# Patient Record
Sex: FEMALE | Race: WHITE | NOT HISPANIC OR LATINO | Employment: FULL TIME | URBAN - METROPOLITAN AREA
[De-identification: names, ages, dates, MRNs, and addresses within clinical notes are randomized per-mention and may not be internally consistent; named-entity substitution may affect disease eponyms.]

---

## 2017-01-13 ENCOUNTER — LAB VISIT (OUTPATIENT)
Dept: LAB | Facility: HOSPITAL | Age: 47
End: 2017-01-13
Attending: FAMILY MEDICINE
Payer: COMMERCIAL

## 2017-01-13 DIAGNOSIS — E78.5 ELEVATED LIPIDS: ICD-10-CM

## 2017-01-13 DIAGNOSIS — R79.89 LOW VITAMIN D LEVEL: ICD-10-CM

## 2017-01-13 LAB
25(OH)D3+25(OH)D2 SERPL-MCNC: 25 NG/ML
ALT SERPL W/O P-5'-P-CCNC: 46 U/L
CHOLEST/HDLC SERPL: 8.3 {RATIO}
HDL/CHOLESTEROL RATIO: 12 %
HDLC SERPL-MCNC: 216 MG/DL
HDLC SERPL-MCNC: 26 MG/DL
LDLC SERPL CALC-MCNC: ABNORMAL MG/DL
NONHDLC SERPL-MCNC: 190 MG/DL
TRIGL SERPL-MCNC: 491 MG/DL

## 2017-01-13 PROCEDURE — 36415 COLL VENOUS BLD VENIPUNCTURE: CPT | Mod: PO

## 2017-01-13 PROCEDURE — 82306 VITAMIN D 25 HYDROXY: CPT

## 2017-01-13 PROCEDURE — 84460 ALANINE AMINO (ALT) (SGPT): CPT

## 2017-01-13 PROCEDURE — 80061 LIPID PANEL: CPT

## 2017-01-16 ENCOUNTER — TELEPHONE (OUTPATIENT)
Dept: FAMILY MEDICINE | Facility: CLINIC | Age: 47
End: 2017-01-16

## 2017-01-16 DIAGNOSIS — R79.89 LOW VITAMIN D LEVEL: ICD-10-CM

## 2017-01-16 DIAGNOSIS — E55.9 VITAMIN D DEFICIENCY: ICD-10-CM

## 2017-01-16 DIAGNOSIS — E78.5 HYPERLIPIDEMIA, UNSPECIFIED HYPERLIPIDEMIA TYPE: Primary | ICD-10-CM

## 2017-01-16 NOTE — TELEPHONE ENCOUNTER
----- Message from Michelle Lopez sent at 1/16/2017  9:49 AM CST -----  PLEASE CALL PT [phone] IN REGARDS TO PT WAS CALLING YOUR OFFICE TO GET LAB TEST RESULTS READ BACK TO HER.

## 2017-02-03 ENCOUNTER — TELEPHONE (OUTPATIENT)
Dept: FAMILY MEDICINE | Facility: CLINIC | Age: 47
End: 2017-02-03

## 2017-02-03 NOTE — TELEPHONE ENCOUNTER
----- Message from Papa Nayak sent at 2/3/2017  8:43 AM CST -----  Pt is requesting the nurse f/u with a refill request for her medication to [facility]. Pt states a fax was sent over to the provider to complete.          Please call pt back [phone]

## 2017-02-06 ENCOUNTER — TELEPHONE (OUTPATIENT)
Dept: FAMILY MEDICINE | Facility: CLINIC | Age: 47
End: 2017-02-06

## 2017-02-06 NOTE — TELEPHONE ENCOUNTER
----- Message from Peggy Wall sent at 2/6/2017 10:04 AM CST -----  Contact: pt  Pt calling to speak to nurse....states that Dr was supposed to get in touch with [facility] for her diabetic supplies so that she can get Rx refill on test strips but has not heard anything yet and is running low on her supplies....please adv/call pt back at [phone]///thx jw

## 2017-02-09 ENCOUNTER — TELEPHONE (OUTPATIENT)
Dept: FAMILY MEDICINE | Facility: CLINIC | Age: 47
End: 2017-02-09

## 2017-02-09 NOTE — TELEPHONE ENCOUNTER
----- Message from Ana Amador sent at 2/9/2017  9:52 AM CST -----  Contact: dwayne gunn  3rd request - needs refill on Lancets and Test strips - request was also faxed by [facility] - pt is out and needs today please.  Please call pt with a status update ASAP today at [phone]

## 2017-03-24 ENCOUNTER — LAB VISIT (OUTPATIENT)
Dept: LAB | Facility: HOSPITAL | Age: 47
End: 2017-03-24
Attending: FAMILY MEDICINE
Payer: COMMERCIAL

## 2017-03-24 DIAGNOSIS — E11.9 TYPE 2 DIABETES MELLITUS WITHOUT COMPLICATION: ICD-10-CM

## 2017-03-24 LAB
ALT SERPL W/O P-5'-P-CCNC: 67 U/L
ANION GAP SERPL CALC-SCNC: 9 MMOL/L
BUN SERPL-MCNC: 12 MG/DL
CALCIUM SERPL-MCNC: 9.5 MG/DL
CHLORIDE SERPL-SCNC: 107 MMOL/L
CHOLEST/HDLC SERPL: 7.4 {RATIO}
CO2 SERPL-SCNC: 24 MMOL/L
CREAT SERPL-MCNC: 0.8 MG/DL
EST. GFR  (AFRICAN AMERICAN): >60 ML/MIN/1.73 M^2
EST. GFR  (NON AFRICAN AMERICAN): >60 ML/MIN/1.73 M^2
GLUCOSE SERPL-MCNC: 112 MG/DL
HDL/CHOLESTEROL RATIO: 13.5 %
HDLC SERPL-MCNC: 244 MG/DL
HDLC SERPL-MCNC: 33 MG/DL
LDLC SERPL CALC-MCNC: ABNORMAL MG/DL
NONHDLC SERPL-MCNC: 211 MG/DL
POTASSIUM SERPL-SCNC: 4.2 MMOL/L
SODIUM SERPL-SCNC: 140 MMOL/L
TRIGL SERPL-MCNC: 403 MG/DL

## 2017-03-24 PROCEDURE — 84460 ALANINE AMINO (ALT) (SGPT): CPT

## 2017-03-24 PROCEDURE — 80061 LIPID PANEL: CPT

## 2017-03-24 PROCEDURE — 36415 COLL VENOUS BLD VENIPUNCTURE: CPT | Mod: PO

## 2017-03-24 PROCEDURE — 83036 HEMOGLOBIN GLYCOSYLATED A1C: CPT

## 2017-03-24 PROCEDURE — 80048 BASIC METABOLIC PNL TOTAL CA: CPT

## 2017-03-25 LAB
ESTIMATED AVG GLUCOSE: 148 MG/DL
HBA1C MFR BLD HPLC: 6.8 %

## 2017-04-08 DIAGNOSIS — E78.5 HYPERLIPIDEMIA, UNSPECIFIED HYPERLIPIDEMIA TYPE: Primary | ICD-10-CM

## 2017-04-08 DIAGNOSIS — E11.9 TYPE 2 DIABETES MELLITUS WITHOUT COMPLICATION, WITHOUT LONG-TERM CURRENT USE OF INSULIN: ICD-10-CM

## 2017-04-08 RX ORDER — ATORVASTATIN CALCIUM 40 MG/1
40 TABLET, FILM COATED ORAL DAILY
Qty: 90 TABLET | Refills: 3 | Status: SHIPPED | OUTPATIENT
Start: 2017-04-08 | End: 2018-04-20 | Stop reason: SDUPTHER

## 2017-04-08 NOTE — TELEPHONE ENCOUNTER
----- Message from Mikey Ayon MD sent at 4/8/2017  9:49 AM CDT -----  Sugar increasing, but still acceptable.  Lipids too high.  Recommend discontinue lovastatin.  Start Lipitor 40 mg daily.  Repeat lipid, ALT, hemoglobin A1c, urine microalbumin in July. My nurse will contact you to arrange.  Thanks,  Dr. Ayon    Results released on [facility]

## 2017-04-10 ENCOUNTER — TELEPHONE (OUTPATIENT)
Dept: FAMILY MEDICINE | Facility: CLINIC | Age: 47
End: 2017-04-10

## 2017-04-10 NOTE — TELEPHONE ENCOUNTER
----- Message from Mariel Rogel sent at 4/10/2017 11:57 AM CDT -----  Contact: Patient  Patient returned call. She can be contacted at [phone].    Thanks,  Mariel

## 2017-04-17 RX ORDER — NORETHINDRONE 0.35 MG/1
TABLET ORAL
Qty: 28 TABLET | Refills: 0 | Status: SHIPPED | OUTPATIENT
Start: 2017-04-17 | End: 2017-06-02 | Stop reason: SDUPTHER

## 2017-06-02 ENCOUNTER — OFFICE VISIT (OUTPATIENT)
Dept: FAMILY MEDICINE | Facility: CLINIC | Age: 47
End: 2017-06-02
Payer: COMMERCIAL

## 2017-06-02 VITALS
WEIGHT: 220.69 LBS | DIASTOLIC BLOOD PRESSURE: 87 MMHG | BODY MASS INDEX: 36.77 KG/M2 | SYSTOLIC BLOOD PRESSURE: 131 MMHG | HEART RATE: 80 BPM | HEIGHT: 65 IN

## 2017-06-02 DIAGNOSIS — Z01.419 WELL WOMAN EXAM WITH ROUTINE GYNECOLOGICAL EXAM: Primary | ICD-10-CM

## 2017-06-02 DIAGNOSIS — Z76.0 MEDICATION REFILL: ICD-10-CM

## 2017-06-02 LAB — B-HCG UR QL: NEGATIVE

## 2017-06-02 PROCEDURE — 99213 OFFICE O/P EST LOW 20 MIN: CPT | Mod: S$GLB,,, | Performed by: NURSE PRACTITIONER

## 2017-06-02 PROCEDURE — 88175 CYTOPATH C/V AUTO FLUID REDO: CPT

## 2017-06-02 PROCEDURE — 99999 PR PBB SHADOW E&M-EST. PATIENT-LVL IV: CPT | Mod: PBBFAC,,, | Performed by: NURSE PRACTITIONER

## 2017-06-02 PROCEDURE — 81025 URINE PREGNANCY TEST: CPT | Mod: PO

## 2017-06-02 RX ORDER — ACETAMINOPHEN AND CODEINE PHOSPHATE 120; 12 MG/5ML; MG/5ML
SOLUTION ORAL
Qty: 28 TABLET | Refills: 11 | Status: SHIPPED | OUTPATIENT
Start: 2017-06-02 | End: 2018-04-20 | Stop reason: SDUPTHER

## 2017-06-02 NOTE — PROGRESS NOTES
Subjective:       Patient ID: Yarely Thompson is a 46 y.o. female.    Chief Complaint: Well Woman    Gynecologic Exam   The patient's pertinent negatives include no genital itching, genital lesions, genital odor, genital rash, missed menses, pelvic pain, vaginal bleeding or vaginal discharge. Primary symptoms comment: Pt in today for routine well woman exam. The patient is experiencing no pain. She is not pregnant. Pertinent negatives include no abdominal pain, anorexia, back pain, chills, constipation, diarrhea, discolored urine, dysuria, fever, flank pain, frequency, headaches, hematuria, joint pain, joint swelling, nausea, painful intercourse, rash, sore throat, urgency or vomiting. She uses oral contraceptives for contraception. Menstrual history: No periods in 2 y; birth control. There is no history of an abdominal surgery, a  section, an ectopic pregnancy, endometriosis, a gynecological surgery, herpes simplex, menorrhagia, metrorrhagia, miscarriage, ovarian cysts, perineal abscess, PID, an STD, a terminated pregnancy or vaginosis.     Past Medical History:   Diagnosis Date    Anxiety     Depression     HTN (hypertension)     Hyperlipidemia     ADEN (stress urinary incontinence, female)     Vitamin D insufficiency      Social History     Social History    Marital status:      Spouse name: N/A    Number of children: N/A    Years of education: N/A     Occupational History         Social History Main Topics    Smoking status: Current Every Day Smoker     Packs/day: 1.50     Years: 28.00     Types: Cigarettes    Smokeless tobacco: Not on file      Comment: current smike 7/day    Alcohol use No    Drug use: Unknown    Sexual activity: Not on file     History reviewed. No pertinent surgical history.    Review of Systems   Constitutional: Negative.  Negative for chills and fever.   HENT: Negative.  Negative for sore throat.    Eyes: Negative.    Respiratory: Negative.    Cardiovascular:  Negative.    Gastrointestinal: Negative.  Negative for abdominal pain, anorexia, constipation, diarrhea, nausea and vomiting.   Endocrine: Negative.    Genitourinary: Negative.  Negative for dysuria, flank pain, frequency, hematuria, menorrhagia, missed menses, pelvic pain, urgency and vaginal discharge.   Musculoskeletal: Negative.  Negative for back pain and joint pain.   Skin: Negative.  Negative for rash.   Allergic/Immunologic: Negative.    Neurological: Negative.  Negative for headaches.   Psychiatric/Behavioral: Negative.        Objective:      Physical Exam   Constitutional: She is oriented to person, place, and time. She appears well-developed and well-nourished.   HENT:   Head: Normocephalic.   Right Ear: External ear normal.   Left Ear: External ear normal.   Nose: Nose normal.   Mouth/Throat: Oropharynx is clear and moist.   Eyes: Conjunctivae are normal. Pupils are equal, round, and reactive to light.   Neck: Normal range of motion. Neck supple.   Cardiovascular: Normal rate, regular rhythm and normal heart sounds.    Pulmonary/Chest: Effort normal and breath sounds normal.   Abdominal: Soft. Bowel sounds are normal. Hernia confirmed negative in the right inguinal area and confirmed negative in the left inguinal area.   Genitourinary: Rectum normal, vagina normal and uterus normal. Pelvic exam was performed with patient supine. No labial fusion. There is no rash, tenderness, lesion or injury on the right labia. There is no rash, tenderness, lesion or injury on the left labia. Cervix exhibits no motion tenderness, no discharge and no friability. Right adnexum displays no mass, no tenderness and no fullness. Left adnexum displays no mass, no tenderness and no fullness.   Musculoskeletal: Normal range of motion.   Lymphadenopathy:        Right: No inguinal adenopathy present.        Left: No inguinal adenopathy present.   Neurological: She is alert and oriented to person, place, and time.   Skin: Skin is  warm and dry. Capillary refill takes 2 to 3 seconds.   Psychiatric: She has a normal mood and affect. Her behavior is normal. Judgment and thought content normal.   Nursing note and vitals reviewed.      Assessment:       1. Well woman exam with routine gynecological exam    2.      Medication refill   Plan:           Yarely was seen today for well woman.    Diagnoses and all orders for this visit:    Well woman exam with routine gynecological exam  Medication refill  -     Liquid-based pap smear, screening  -     Mammo Digital Screening Bilateral With CAD; Future  -     Pregnancy, urine rapid (results: negative)  -     norethindrone (ANAY-BE) 0.35 mg tablet; Take 1 tablet (0.35 mg total) by mouth once daily.

## 2017-06-13 ENCOUNTER — TELEPHONE (OUTPATIENT)
Dept: FAMILY MEDICINE | Facility: CLINIC | Age: 47
End: 2017-06-13

## 2017-06-13 RX ORDER — METFORMIN HYDROCHLORIDE 850 MG/1
TABLET ORAL
Qty: 60 TABLET | OUTPATIENT
Start: 2017-06-13

## 2017-06-13 RX ORDER — GABAPENTIN 300 MG/1
CAPSULE ORAL
Qty: 90 CAPSULE | Refills: 1 | Status: SHIPPED | OUTPATIENT
Start: 2017-06-13 | End: 2017-07-14 | Stop reason: SDUPTHER

## 2017-06-13 NOTE — TELEPHONE ENCOUNTER
----- Message from Michelle Lopez sent at 6/13/2017 11:01 AM CDT -----  Pt would like to know if she can have a antibiotic call in for a cold to [facility] in [facility].pt can be reach at [phone].

## 2017-06-13 NOTE — TELEPHONE ENCOUNTER
C/o chest cold, cough & congestion, for couple of days, afebrile, offered OV, declined, requests cough medication, will wait for Dr Ayon tomorrow.

## 2017-06-15 RX ORDER — INSULIN ASPART 100 [IU]/ML
18 INJECTION, SOLUTION INTRAVENOUS; SUBCUTANEOUS
Qty: 15 ML | Refills: 12 | Status: SHIPPED | OUTPATIENT
Start: 2017-06-15 | End: 2018-09-01 | Stop reason: SDUPTHER

## 2017-07-14 ENCOUNTER — HOSPITAL ENCOUNTER (OUTPATIENT)
Dept: RADIOLOGY | Facility: HOSPITAL | Age: 47
Discharge: HOME | End: 2017-07-14
Attending: NURSE PRACTITIONER
Payer: COMMERCIAL

## 2017-07-14 VITALS — BODY MASS INDEX: 36.65 KG/M2 | HEIGHT: 65 IN | WEIGHT: 220 LBS

## 2017-07-14 DIAGNOSIS — Z01.419 WELL WOMAN EXAM WITH ROUTINE GYNECOLOGICAL EXAM: ICD-10-CM

## 2017-07-14 PROCEDURE — 77067 SCR MAMMO BI INCL CAD: CPT | Mod: TC

## 2017-07-14 PROCEDURE — 77067 SCR MAMMO BI INCL CAD: CPT | Mod: 26,,, | Performed by: RADIOLOGY

## 2017-07-14 PROCEDURE — 77063 BREAST TOMOSYNTHESIS BI: CPT | Mod: 26,,, | Performed by: RADIOLOGY

## 2017-07-14 RX ORDER — ERGOCALCIFEROL 1.25 MG/1
CAPSULE ORAL
Qty: 4 CAPSULE | Refills: 0 | Status: SHIPPED | OUTPATIENT
Start: 2017-07-14 | End: 2018-04-20

## 2017-07-14 RX ORDER — METFORMIN HYDROCHLORIDE 850 MG/1
TABLET ORAL
Qty: 60 TABLET | Refills: 5 | Status: SHIPPED | OUTPATIENT
Start: 2017-07-14 | End: 2018-03-16 | Stop reason: SDUPTHER

## 2017-07-14 RX ORDER — PEN NEEDLE, DIABETIC 31 GX3/16"
NEEDLE, DISPOSABLE MISCELLANEOUS
Qty: 100 EACH | Refills: 5 | Status: SHIPPED | OUTPATIENT
Start: 2017-07-14

## 2017-07-17 RX ORDER — GABAPENTIN 300 MG/1
CAPSULE ORAL
Qty: 90 CAPSULE | Refills: 4 | Status: SHIPPED | OUTPATIENT
Start: 2017-07-17 | End: 2018-04-20

## 2017-07-26 DIAGNOSIS — Z79.4 TYPE 2 DIABETES MELLITUS WITH MICROALBUMINURIA, WITH LONG-TERM CURRENT USE OF INSULIN: Primary | ICD-10-CM

## 2017-07-26 DIAGNOSIS — E11.29 TYPE 2 DIABETES MELLITUS WITH MICROALBUMINURIA, WITH LONG-TERM CURRENT USE OF INSULIN: Primary | ICD-10-CM

## 2017-07-26 DIAGNOSIS — R80.9 TYPE 2 DIABETES MELLITUS WITH MICROALBUMINURIA, WITH LONG-TERM CURRENT USE OF INSULIN: Primary | ICD-10-CM

## 2017-07-26 RX ORDER — LISINOPRIL 20 MG/1
20 TABLET ORAL DAILY
Qty: 90 TABLET | Refills: 3 | Status: SHIPPED | OUTPATIENT
Start: 2017-07-26 | End: 2018-09-15 | Stop reason: SDUPTHER

## 2018-01-12 ENCOUNTER — LAB VISIT (OUTPATIENT)
Dept: LAB | Facility: HOSPITAL | Age: 48
End: 2018-01-12
Attending: FAMILY MEDICINE
Payer: COMMERCIAL

## 2018-01-12 DIAGNOSIS — E11.29 TYPE 2 DIABETES MELLITUS WITH MICROALBUMINURIA, WITH LONG-TERM CURRENT USE OF INSULIN: ICD-10-CM

## 2018-01-12 DIAGNOSIS — R80.9 TYPE 2 DIABETES MELLITUS WITH MICROALBUMINURIA, WITH LONG-TERM CURRENT USE OF INSULIN: ICD-10-CM

## 2018-01-12 DIAGNOSIS — Z79.4 TYPE 2 DIABETES MELLITUS WITH MICROALBUMINURIA, WITH LONG-TERM CURRENT USE OF INSULIN: ICD-10-CM

## 2018-01-12 LAB
CREAT UR-MCNC: 97 MG/DL
MICROALBUMIN UR DL<=1MG/L-MCNC: 365 UG/ML
MICROALBUMIN/CREATININE RATIO: 376.3 UG/MG

## 2018-01-12 PROCEDURE — 82043 UR ALBUMIN QUANTITATIVE: CPT

## 2018-01-29 ENCOUNTER — TELEPHONE (OUTPATIENT)
Dept: FAMILY MEDICINE | Facility: CLINIC | Age: 48
End: 2018-01-29

## 2018-01-29 DIAGNOSIS — E78.5 HYPERLIPIDEMIA, UNSPECIFIED HYPERLIPIDEMIA TYPE: Primary | ICD-10-CM

## 2018-03-16 ENCOUNTER — OFFICE VISIT (OUTPATIENT)
Dept: FAMILY MEDICINE | Facility: CLINIC | Age: 48
End: 2018-03-16
Payer: COMMERCIAL

## 2018-03-16 ENCOUNTER — TELEPHONE (OUTPATIENT)
Dept: FAMILY MEDICINE | Facility: CLINIC | Age: 48
End: 2018-03-16

## 2018-03-16 VITALS
WEIGHT: 216.63 LBS | BODY MASS INDEX: 34.81 KG/M2 | HEIGHT: 66 IN | TEMPERATURE: 98 F | HEART RATE: 82 BPM | SYSTOLIC BLOOD PRESSURE: 132 MMHG | DIASTOLIC BLOOD PRESSURE: 82 MMHG

## 2018-03-16 DIAGNOSIS — E78.5 HYPERLIPIDEMIA ASSOCIATED WITH TYPE 2 DIABETES MELLITUS: ICD-10-CM

## 2018-03-16 DIAGNOSIS — E11.59 HYPERTENSION ASSOCIATED WITH DIABETES: ICD-10-CM

## 2018-03-16 DIAGNOSIS — F17.200 SMOKING: ICD-10-CM

## 2018-03-16 DIAGNOSIS — I15.2 HYPERTENSION ASSOCIATED WITH DIABETES: ICD-10-CM

## 2018-03-16 DIAGNOSIS — E11.69 HYPERLIPIDEMIA ASSOCIATED WITH TYPE 2 DIABETES MELLITUS: ICD-10-CM

## 2018-03-16 DIAGNOSIS — E66.01 SEVERE OBESITY (BMI 35.0-35.9 WITH COMORBIDITY): ICD-10-CM

## 2018-03-16 DIAGNOSIS — Z00.00 ANNUAL PHYSICAL EXAM: Primary | ICD-10-CM

## 2018-03-16 PROCEDURE — 99396 PREV VISIT EST AGE 40-64: CPT | Mod: S$GLB,,, | Performed by: FAMILY MEDICINE

## 2018-03-16 PROCEDURE — 99999 PR PBB SHADOW E&M-EST. PATIENT-LVL IV: CPT | Mod: PBBFAC,,, | Performed by: FAMILY MEDICINE

## 2018-03-16 RX ORDER — METFORMIN HYDROCHLORIDE 850 MG/1
TABLET ORAL
Qty: 60 TABLET | Refills: 5 | Status: SHIPPED | OUTPATIENT
Start: 2018-03-16 | End: 2018-04-20

## 2018-03-16 NOTE — PROGRESS NOTES
"Presents physical exam.  Diabetes has been controlled on insulin.  She does miss or forget to take her Lantus more than half the time especially if she doesn't eat in the evening.  States compliance with her mealtime insulin.  She is concerned regarding weight gain.  Multiple medications which can contribute including her insulin and psychiatric medications for which she sees psychiatrist.  She does continue to smoke, not currently interested in cessation.    Diabetes Management Status    Statin: Taking  ACE/ARB: Taking    Screening or Prevention Patient's value Goal Complete/Controlled?   HgA1C Testing and Control   Lab Results   Component Value Date    HGBA1C 6.5 (H) 01/12/2018      Annually/Less than 8% Yes   Lipid profile : 07/14/2017 Annually Yes   LDL control Lab Results   Component Value Date    LDLCALC 69.2 07/14/2017    Annually/Less than 100 mg/dl  Yes   Nephropathy screening Lab Results   Component Value Date    LABMICR 365.0 01/12/2018     No results found for: PROTEINUA Annually Yes   Blood pressure BP Readings from Last 1 Encounters:   03/16/18 132/82    Less than 140/90 Yes   Dilated retinal exam : 07/27/2016 Annually No   Foot exam   : 03/16/2018 Annually Yes       OBJECTIVE:   Vitals:    03/16/18 1345   BP: 132/82   Pulse: 82   Temp: 98.2 °F (36.8 °C)   TempSrc: Oral   Weight: 98.2 kg (216 lb 9.6 oz)   Height: 5' 5.5" (1.664 m)     Wt Readings from Last 3 Encounters:   03/16/18 98.2 kg (216 lb 9.6 oz)   07/14/17 99.8 kg (220 lb)   06/02/17 100.1 kg (220 lb 10.9 oz)       APPEARANCE: Well nourished, well developed, in no acute distress.   HEAD: Normocephalic. Atraumatic. No sinus tenderness.   EYES:   Right eye: Pupil reactive. Conjunctiva clear.   Left eye: Pupil reactive. Conjunctiva clear.   Both fundi: Grossly normal to nondilated exam. EOMI.   EARS: TM's intact. Light reflex normal. No retraction or perforation.   NOSE: clear.   MOUTH & THROAT: No pharyngeal erythema or exudate. No lesions. "   NECK: No bruits. No JVD. No cervical lymphadenopathy. No thyromegaly.   CHEST: Breath sounds clear bilaterally. Normal respiratory effort   CARDIOVASCULAR: Normal rate. Regular rhythm. No murmurs. No rub. No gallops.   ABDOMEN: Bowel sounds normal. Soft. No tenderness. No organomegaly.   PERIPHERAL VASCULAR: No cyanosis. No clubbing. No edema.   NEUROLOGIC: No focal findings.    Feet:Sensation in the feet intact to monofilament testing.   No significant foot lesions.Pulses palpable.  MENTAL STATUS: Alert. Oriented x 3.         Yarely was seen today for annual exam.    Diagnoses and all orders for this visit:    Annual physical exam    Hypertension associated with diabetes  -     Ambulatory referral to Optometry    Hyperlipidemia associated with type 2 diabetes mellitus    Smoking    Severe obesity (BMI 35.0-35.9 with comorbidity)    Other orders  -     dulaglutide (TRULICITY) 0.75 mg/0.5 mL PnIj; Inject 0.5 mLs (0.75 mg total) into the skin every 7 days.     will discontinue the Lantus for now.  Continue other medication as currently.  Monitor glucose readings closely.  Follow-up 1 month.  Request report from last tetanus immunization at [place].  Has follow-up laboratory scheduled later this year.Anticipatory guidance: Don't smoke.  Healthy diet and regular exercise recommended.  Keep follow-up psychiatry

## 2018-03-16 NOTE — TELEPHONE ENCOUNTER
----- Message from Brittany Matt sent at 3/16/2018 10:12 AM CDT -----  Contact: Patient  1. What is the name of the medication you are requesting? Lantus  2. What is the dose? Does not know  3. How do you take the medication? Orally, topically, etc?   4. How often do you take this medication? Once nightly  5. Do you need a 30 day or 90 day supply? 90  6. How many refills are you requesting? 1  7. What is your preferred pharmacy and location of the pharmacy? [facility]  8. Who can we contact with further questions? Patient at [phone]

## 2018-04-06 ENCOUNTER — PATIENT OUTREACH (OUTPATIENT)
Dept: ADMINISTRATIVE | Facility: HOSPITAL | Age: 48
End: 2018-04-06

## 2018-04-06 NOTE — PROGRESS NOTES
Pre-Visit Chart audit complete, Immunization record (Flu Vaccine) and HM updated.  Alert PCP/Staff concering HM updates needed.

## 2018-04-20 ENCOUNTER — OFFICE VISIT (OUTPATIENT)
Dept: FAMILY MEDICINE | Facility: CLINIC | Age: 48
End: 2018-04-20
Payer: COMMERCIAL

## 2018-04-20 VITALS
DIASTOLIC BLOOD PRESSURE: 78 MMHG | HEART RATE: 82 BPM | HEIGHT: 66 IN | BODY MASS INDEX: 33.77 KG/M2 | SYSTOLIC BLOOD PRESSURE: 123 MMHG | WEIGHT: 210.13 LBS

## 2018-04-20 DIAGNOSIS — E11.9 TYPE 2 DIABETES MELLITUS WITHOUT COMPLICATION, WITH LONG-TERM CURRENT USE OF INSULIN: Primary | ICD-10-CM

## 2018-04-20 DIAGNOSIS — Z79.4 TYPE 2 DIABETES MELLITUS WITHOUT COMPLICATION, WITH LONG-TERM CURRENT USE OF INSULIN: Primary | ICD-10-CM

## 2018-04-20 DIAGNOSIS — E11.69 HYPERLIPIDEMIA ASSOCIATED WITH TYPE 2 DIABETES MELLITUS: ICD-10-CM

## 2018-04-20 DIAGNOSIS — E78.5 HYPERLIPIDEMIA ASSOCIATED WITH TYPE 2 DIABETES MELLITUS: ICD-10-CM

## 2018-04-20 DIAGNOSIS — Z91.148 NONCOMPLIANCE WITH MEDICATION TREATMENT DUE TO UNDERUSE OF MEDICATION: ICD-10-CM

## 2018-04-20 PROBLEM — E66.09 OBESITY DUE TO EXCESS CALORIES WITH SERIOUS COMORBIDITY: Status: ACTIVE | Noted: 2018-03-16

## 2018-04-20 PROCEDURE — 99213 OFFICE O/P EST LOW 20 MIN: CPT | Mod: 25,S$GLB,, | Performed by: FAMILY MEDICINE

## 2018-04-20 PROCEDURE — 3074F SYST BP LT 130 MM HG: CPT | Mod: CPTII,S$GLB,, | Performed by: FAMILY MEDICINE

## 2018-04-20 PROCEDURE — 90471 IMMUNIZATION ADMIN: CPT | Mod: S$GLB,,, | Performed by: FAMILY MEDICINE

## 2018-04-20 PROCEDURE — 3078F DIAST BP <80 MM HG: CPT | Mod: CPTII,S$GLB,, | Performed by: FAMILY MEDICINE

## 2018-04-20 PROCEDURE — 99999 PR PBB SHADOW E&M-EST. PATIENT-LVL III: CPT | Mod: PBBFAC,,, | Performed by: FAMILY MEDICINE

## 2018-04-20 PROCEDURE — 3044F HG A1C LEVEL LT 7.0%: CPT | Mod: CPTII,S$GLB,, | Performed by: FAMILY MEDICINE

## 2018-04-20 PROCEDURE — 90715 TDAP VACCINE 7 YRS/> IM: CPT | Mod: S$GLB,,, | Performed by: FAMILY MEDICINE

## 2018-04-20 RX ORDER — METFORMIN HYDROCHLORIDE 500 MG/1
2000 TABLET, EXTENDED RELEASE ORAL
Qty: 360 TABLET | Refills: 1 | Status: SHIPPED | OUTPATIENT
Start: 2018-04-20 | End: 2018-11-01 | Stop reason: SDUPTHER

## 2018-04-20 RX ORDER — ATORVASTATIN CALCIUM 40 MG/1
40 TABLET, FILM COATED ORAL DAILY
Qty: 90 TABLET | Refills: 3 | Status: SHIPPED | OUTPATIENT
Start: 2018-04-20 | End: 2019-04-25 | Stop reason: SDUPTHER

## 2018-04-20 RX ORDER — ACETAMINOPHEN AND CODEINE PHOSPHATE 120; 12 MG/5ML; MG/5ML
SOLUTION ORAL
Qty: 28 TABLET | Refills: 5 | Status: SHIPPED | OUTPATIENT
Start: 2018-04-20 | End: 2018-08-01

## 2018-04-20 NOTE — PROGRESS NOTES
Follow-up diabetes.  We started Trulicity and discontinued Lantus in an effort to promote weight loss.  She did lose 6 pounds.  However her glucose readings are elevated, somewhat labile.  She's been missing her evening dose of metformin for quite a while.  States compliance with mealtime insulin.    Past Medical History:  Past Medical History:   Diagnosis Date    Anxiety     Depression     HTN (hypertension)     Hyperlipidemia     Severe obesity (BMI 35.0-35.9 with comorbidity) 3/16/2018    ADEN (stress urinary incontinence, female)     Vitamin D insufficiency      No past surgical history on file.  Social History     Social History    Marital status:      Spouse name: N/A    Number of children: N/A    Years of education: N/A     Occupational History    Not on file.     Social History Main Topics    Smoking status: Current Every Day Smoker     Packs/day: 1.50     Years: 28.00     Types: Cigarettes    Smokeless tobacco: Not on file      Comment: current smike 7/day    Alcohol use No    Drug use: Unknown    Sexual activity: Not on file     Other Topics Concern    Not on file     Social History Narrative    No narrative on file     Family History   Problem Relation Age of Onset    Lymphoma Father     Hypertension Father     Diabetes Father     Hypertension Mother     Diabetes Mother      Review of patient's allergies indicates:   Allergen Reactions    Pcn [penicillins] Rash     Current Outpatient Prescriptions on File Prior to Visit   Medication Sig Dispense Refill    aripiprazole (ABILIFY) 5 MG Tab Take 1 tablet (5 mg total) by mouth once daily. 30 tablet 0    blood-glucose meter (FREESTYLE SYSTEM KIT) kit Use as directed      clonazePAM (KLONOPIN) 0.5 MG tablet Take 0.5 mg by mouth 2 (two) times daily as needed.  2    dulaglutide (TRULICITY) 0.75 mg/0.5 mL PnIj Inject 0.5 mLs (0.75 mg total) into the skin every 7 days. 4 Syringe 11    insulin aspart (NOVOLOG FLEXPEN) 100 unit/mL  "InPn pen Inject 18 Units into the skin 3 (three) times daily before meals. 15 mL 12    lisinopril (PRINIVIL,ZESTRIL) 20 MG tablet Take 1 tablet (20 mg total) by mouth once daily. 90 tablet 3    norethindrone (ANAY-BE) 0.35 mg tablet Take 1 tablet (0.35 mg total) by mouth once daily. 28 tablet 11    sertraline (ZOLOFT) 100 MG tablet Take 200 mg by mouth once daily.  2    SURE COMFORT PEN NEEDLE 31 gauge x 3/16" Ndle USE TO INJECT INSULINS FOUR TIMES DAILY 100 each 5    [DISCONTINUED] metFORMIN (GLUCOPHAGE) 850 MG tablet TAKE ONE TABLET BY MOUTH TWICE DAILY WITH MEALS 60 tablet 5    [DISCONTINUED] atorvastatin (LIPITOR) 40 MG tablet Take 1 tablet (40 mg total) by mouth once daily. 90 tablet 3    [DISCONTINUED] gabapentin (NEURONTIN) 300 MG capsule Take 1 Capsule BY MOUTH THREE TIMES DAILY 90 capsule 4    [DISCONTINUED] insulin lispro (HUMALOG) 100 unit/mL injection Inject 18 Units into the skin 3 (three) times daily before meals. 10 mL 11    [DISCONTINUED] VITAMIN D2 50,000 unit capsule TAKE ONE CAPSULE BY MOUTH EVERY 7 days 4 capsule 0     No current facility-administered medications on file prior to visit.            ROS:  GENERAL: No fever, chills,  or significant weight changes.   CARDIOVASCULAR: Denies chest pain, PND, orthopnea or reduced exercise tolerance.  ABDOMEN: Appetite fine. Denies diarrhea, abdominal pain, hematemesis or blood in stool.  URINARY: No flank pain, dysuria or hematuria.      OBJECTIVE:     Vitals:    04/20/18 0756   BP: 123/78   Pulse: 82   Weight: 95.3 kg (210 lb 1.6 oz)   Height: 5' 6" (1.676 m)     Wt Readings from Last 3 Encounters:   04/20/18 95.3 kg (210 lb 1.6 oz)   03/16/18 98.2 kg (216 lb 9.6 oz)   07/14/17 99.8 kg (220 lb)     APPEARANCE: Well nourished, well developed, in no acute distress.    MENTAL STATUS: Alert.  Oriented x 3.    Yarely was seen today for follow-up.    Diagnoses and all orders for this visit:    Type 2 diabetes mellitus without complication, with " long-term current use of insulin    Hyperlipidemia associated with type 2 diabetes mellitus    Noncompliance with medication treatment due to underuse of medication    Other orders  -     metFORMIN (GLUCOPHAGE-XR) 500 MG 24 hr tablet; Take 4 tablets (2,000 mg total) by mouth daily with breakfast.  -     atorvastatin (LIPITOR) 40 MG tablet; Take 1 tablet (40 mg total) by mouth once daily.  -     Tdap Vaccine; Future  -     Tdap Vaccine    Change metformin as above.  Encourage compliance with medication.  Follow-up 1 month with home glucose readings.  Home glucose readings from home attached  RF lipitor

## 2018-05-04 ENCOUNTER — OFFICE VISIT (OUTPATIENT)
Dept: OPTOMETRY | Facility: CLINIC | Age: 48
End: 2018-05-04
Payer: COMMERCIAL

## 2018-05-04 DIAGNOSIS — Z13.5 GLAUCOMA SCREENING: ICD-10-CM

## 2018-05-04 DIAGNOSIS — E11.9 DIABETES MELLITUS TYPE 2 WITHOUT RETINOPATHY: Primary | ICD-10-CM

## 2018-05-04 DIAGNOSIS — H52.13 MYOPIA WITH ASTIGMATISM AND PRESBYOPIA, BILATERAL: ICD-10-CM

## 2018-05-04 DIAGNOSIS — H52.4 MYOPIA WITH ASTIGMATISM AND PRESBYOPIA, BILATERAL: ICD-10-CM

## 2018-05-04 DIAGNOSIS — H43.393 VITREOUS SYNERESIS, BILATERAL: ICD-10-CM

## 2018-05-04 DIAGNOSIS — H52.203 MYOPIA WITH ASTIGMATISM AND PRESBYOPIA, BILATERAL: ICD-10-CM

## 2018-05-04 PROCEDURE — 92015 DETERMINE REFRACTIVE STATE: CPT | Mod: S$GLB,,, | Performed by: OPTOMETRIST

## 2018-05-04 PROCEDURE — 99999 PR PBB SHADOW E&M-EST. PATIENT-LVL III: CPT | Mod: PBBFAC,,, | Performed by: OPTOMETRIST

## 2018-05-04 PROCEDURE — 92014 COMPRE OPH EXAM EST PT 1/>: CPT | Mod: S$GLB,,, | Performed by: OPTOMETRIST

## 2018-05-04 NOTE — PATIENT INSTRUCTIONS
FLASHES / FLOATERS / POSTERIOR VITREOUS DETACHMENT    Call the clinic if you have any further changes in symptoms.  Including:  Increased numbers of floaters or flashing lights, dimness or darkness that moves through or stays constant in your vision, or any pain in the eye (s).            DIABETES AND THE EYE / DIABETIC RETINOPATHY    Diabetic retinopathy is a condition occurring in persons with diabetes, which causes progressive damage to the retina, the light sensitive lining at the back of the eye. It is a serious sight-threatening complication of diabetes.    Diabetic retinopathy is the result of damage to the tiny blood vessels that nourish the retina. They leak blood and other fluids that cause swelling of retinal tissue and clouding of vision. The condition usually affects both eyes. The longer a person has diabetes, the more likely they will develop diabetic retinopathy. If left untreated, diabetic retinopathy can cause blindness.  There are two basic types of diabetic retinopathy:    Background or nonproliferative diabetic retinopathy (NPDR)  Nonproliferative diabetic retinopathy (NPDR) is the earliest stage of diabetic retinopathy. With this condition, damaged blood vessels in the retina begin to leak extra fluid and small amounts of blood into the eye. Sometimes, deposits of cholesterol or other fats from the blood may leak into the retina. Many people with diabetes have mild NPDR, which usually does not affect their vision. However, if their vision is affected, it is the result of macular edema and macular ischemia.    If vision is affected due to macular changes, a consult with a Retina Specialist may be advised.  This is an ophthalmologist that treats retina conditions, including diabetic retinopathy.     Proliferative diabetic retinopathy (PDR)  Proliferative diabetic retinopathy (PDR) mainly occurs when many of the blood vessels in the retina close, preventing enough blood flow. In an attempt to  "supply blood to the area where the original vessels closed, the retina responds by growing new blood vessels. This is called neovascularization. However, these new blood vessels are abnormal and do not supply the retina with proper blood flow. The new vessels are also often accompanied by scar tissue that may cause the retina to wrinkle or detach. PDR may cause more severe vision loss than NPDR because it can affect both central and peripheral vision.     A patient diagnosed with proliferative diabetic eye disease will be referred to a retinal specialist for consultation.    Often there are no visual symptoms in the early stages of diabetic retinopathy. That is why our eye care professionals recommend that everyone with diabetes have a comprehensive dilated eye examination once a year. Early detection and treatment can limit the potential for significant vision loss from diabetic retinopathy.        DRY EYES:  Use Over The Counter artificial tears as needed for dry eye symptoms.  Some common brands include:  Systane, Optive, and Refresh.  These drops can be used as frequently as desired, but may be most helpful use during long periods of concentrated work.  For example, reading / working at the computer.  Avoid drops that "get redness out", as these contain medication that may further irritate the eyes.    ALLERGY EYES / SYMPTOMS:    Over the counter medications include--Zaditor and Alaway  Use as directed 1-2 drops daily for symptoms of itching / watering eyes.  These drops will not help for dry eye or exposure symptoms.      "

## 2018-05-04 NOTE — LETTER
May 4, 2018      Mikey Ayon MD  [id number] [facility] [id number]           [place] [facility]  [address] [facility] [address] [place] [address]  Phone: [phone]  Fax: [phone]          Patient: Yarely Thompson   MR Number: [id number]   YOB: 1970   Date of Visit: 5/4/2018       Dear Dr. Mikey Ayon:    Thank you for referring Yarely Thompson to me for evaluation. Attached you will find relevant portions of my assessment and plan of care.    If you have questions, please do not hesitate to call me. I look forward to following Yarely Thompson along with you.    Sincerely,    CEDRICK Bolton, OD    Enclosure  CC:  No Recipients    If you would like to receive this communication electronically, please contact [email or web address] or [phone] to request more information on [employer] Link access.    For providers and/or their staff who would like to refer a patient to [facility], please contact us through our one-stop-shop provider referral line, [facility], at [phone].    If you feel you have received this communication in error or would no longer like to receive these types of communications, please e-mail [email or web address]

## 2018-05-04 NOTE — PROGRESS NOTES
HPI     Annual Exam    Additional comments: DLE 7-16 (faiza)    ocular health exam            Diabetic Eye Exam    Additional comments: BSL fluctuates           Blurred Vision    Additional comments: at near only -- distance [facility] good           Spots and/or Floaters    Additional comments: OU -- no light flashes           Comments   Hemoglobin A1C       Date                     Value               Ref Range             Status                01/12/2018               6.5 (H)             4.0 - 5.6 %           Final              Comment:    According to ADA guidelines, hemoglobin A1c <7.0% represents  optimal   control in non-pregnant diabetic patients. Different  metrics may apply to   specific patient populations.   Standards of Medical Care in   Diabetes-2016.  For the purpose of screening for the presence of   diabetes:  <5.7%     Consistent with the absence of diabetes  5.7-6.4%    Consistent with increasing risk for diabetes   (prediabetes)  >or=6.5%    Consistent with diabetes  Currently, no consensus exists for use of   hemoglobin A1c  for diagnosis of diabetes for children.  This Hemoglobin   A1c assay has significant interference with fetal   hemoglobin   (HbF).   The results are invalid for patients with abnormal amounts of   HbF,     including those with known Hereditary Persistence   of Fetal Hemoglobin.   Heterozygous hemoglobin variants (HbAS, HbAC,   HbAD, HbAE, HbA2) do not   significantly interfere with this assay;   however, presence of multiple   variants in a sample may impact the %   interference.         07/14/2017               6.2 (H)             4.0 - 5.6 %           Final              Comment:    According to ADA guidelines, hemoglobin A1c <7.0% represents  optimal   control in non-pregnant diabetic patients. Different  metrics may apply to   specific patient populations.   Standards of Medical Care in   Diabetes-2016.  For the purpose of screening for the presence of   diabetes:  <5.7%      Consistent with the absence of diabetes  5.7-6.4%    Consistent with increasing risk for diabetes   (prediabetes)  >or=6.5%    Consistent with diabetes  Currently, no consensus exists for use of   hemoglobin A1c  for diagnosis of diabetes for children.  This Hemoglobin   A1c assay has significant interference with fetal   hemoglobin   (HbF).   The results are invalid for patients with abnormal amounts of   HbF,     including those with known Hereditary Persistence   of Fetal Hemoglobin.   Heterozygous hemoglobin variants (HbAS, HbAC,   HbAD, HbAE, HbA2) do not   significantly interfere with this assay;   however, presence of multiple   variants in a sample may impact the %   interference.         03/24/2017               6.8 (H)             4.5 - 6.2 %           Final              Comment:    According to ADA guidelines, hemoglobin A1C <7.0% represents  optimal   control in non-pregnant diabetic patients.  Different  metrics may apply   to specific populations.   Standards of Medical Care in Diabetes -   2016.  For the purpose of screening for the presence of diabetes:  <5.7%       Consistent with the absence of diabetes  5.7-6.4%  Consistent with   increasing risk for diabetes   (prediabetes)  >or=6.5%  Consistent with   diabetes  Currently no consensus exists for use of hemoglobin A1C  for   diagnosis of diabetes for children.    ----------         Last edited by Vanna Adams on 5/4/2018  9:24 AM. (History)        ROS     Positive for: Eyes    Last edited by CEDRICK Bolton, OD on 5/4/2018  9:42 AM. (History)        Assessment /Plan     For exam results, see Encounter Report.    Diabetes mellitus type 2 without retinopathy    Vitreous syneresis, bilateral    Glaucoma screening    Myopia with astigmatism and presbyopia, bilateral      1. No ret/ no csme, gave info, control glucose, annual DFE  2. RD precautions given  3. Not suspect  4. Updated specs rx, gave copy    Discussed and educated patient on current  findings /plan.  RTC 1 year, prn if any changes / issues

## 2018-05-18 ENCOUNTER — PATIENT OUTREACH (OUTPATIENT)
Dept: ADMINISTRATIVE | Facility: HOSPITAL | Age: 48
End: 2018-05-18

## 2018-06-01 ENCOUNTER — OFFICE VISIT (OUTPATIENT)
Dept: FAMILY MEDICINE | Facility: CLINIC | Age: 48
End: 2018-06-01
Payer: COMMERCIAL

## 2018-06-01 VITALS
TEMPERATURE: 98 F | HEART RATE: 79 BPM | BODY MASS INDEX: 33.27 KG/M2 | HEIGHT: 66 IN | SYSTOLIC BLOOD PRESSURE: 130 MMHG | WEIGHT: 207 LBS | DIASTOLIC BLOOD PRESSURE: 87 MMHG

## 2018-06-01 DIAGNOSIS — R23.2 HOT FLASHES: ICD-10-CM

## 2018-06-01 DIAGNOSIS — E11.9 TYPE 2 DIABETES MELLITUS WITHOUT COMPLICATION, WITH LONG-TERM CURRENT USE OF INSULIN: Primary | ICD-10-CM

## 2018-06-01 DIAGNOSIS — Z12.31 ENCOUNTER FOR SCREENING MAMMOGRAM FOR MALIGNANT NEOPLASM OF BREAST: ICD-10-CM

## 2018-06-01 DIAGNOSIS — Z79.4 TYPE 2 DIABETES MELLITUS WITHOUT COMPLICATION, WITH LONG-TERM CURRENT USE OF INSULIN: Primary | ICD-10-CM

## 2018-06-01 DIAGNOSIS — F17.200 SMOKING: ICD-10-CM

## 2018-06-01 DIAGNOSIS — E66.09 CLASS 1 OBESITY DUE TO EXCESS CALORIES WITH SERIOUS COMORBIDITY AND BODY MASS INDEX (BMI) OF 33.0 TO 33.9 IN ADULT: ICD-10-CM

## 2018-06-01 PROCEDURE — 3008F BODY MASS INDEX DOCD: CPT | Mod: CPTII,S$GLB,, | Performed by: FAMILY MEDICINE

## 2018-06-01 PROCEDURE — 99214 OFFICE O/P EST MOD 30 MIN: CPT | Mod: S$GLB,,, | Performed by: FAMILY MEDICINE

## 2018-06-01 PROCEDURE — 3079F DIAST BP 80-89 MM HG: CPT | Mod: CPTII,S$GLB,, | Performed by: FAMILY MEDICINE

## 2018-06-01 PROCEDURE — 3075F SYST BP GE 130 - 139MM HG: CPT | Mod: CPTII,S$GLB,, | Performed by: FAMILY MEDICINE

## 2018-06-01 PROCEDURE — 3044F HG A1C LEVEL LT 7.0%: CPT | Mod: CPTII,S$GLB,, | Performed by: FAMILY MEDICINE

## 2018-06-01 PROCEDURE — 99999 PR PBB SHADOW E&M-EST. PATIENT-LVL III: CPT | Mod: PBBFAC,,, | Performed by: FAMILY MEDICINE

## 2018-06-01 NOTE — PROGRESS NOTES
follow-up diabetes.  Home glucose readings as below.  She has had some improvement with changing all of her metformin to the morning.  Readings particularly improved in the past couple of weeks.  Recently discontinued Lantus and changed to Trulicity in an effort to improve weight loss.  She has lost some weight with this.  No hypoglycemia.  She continues take her NovoLog insulin with meals however states only eats once or twice a day socially takes insulin once or twice a day.  She is also concerned regarding some hot flashes.  Believe she could be menopausal.  She has not had menstrual cycle in the past couple of years while taking norethindrone birth control.        Past Medical History:  Past Medical History:   Diagnosis Date    Anxiety     Depression     HTN (hypertension)     Hyperlipidemia     Severe obesity (BMI 35.0-35.9 with comorbidity) 3/16/2018    ADEN (stress urinary incontinence, female)     Vitamin D insufficiency      No past surgical history on file.  Social History     Social History    Marital status:      Spouse name: N/A    Number of children: N/A    Years of education: N/A     Occupational History    Not on file.     Social History Main Topics    Smoking status: Current Every Day Smoker     Packs/day: 1.50     Years: 28.00     Types: Cigarettes    Smokeless tobacco: Not on file      Comment: current smike 7/day    Alcohol use No    Drug use: Unknown    Sexual activity: Not on file     Other Topics Concern    Not on file     Social History Narrative    No narrative on file     Family History   Problem Relation Age of Onset    Lymphoma Father     Hypertension Father     Diabetes Father     Hypertension Mother     Diabetes Mother      Review of patient's allergies indicates:   Allergen Reactions    Pcn [penicillins] Rash     Current Outpatient Prescriptions on File Prior to Visit   Medication Sig Dispense Refill    aripiprazole (ABILIFY) 5 MG Tab Take 1 tablet (5 mg  "total) by mouth once daily. 30 tablet 0    atorvastatin (LIPITOR) 40 MG tablet Take 1 tablet (40 mg total) by mouth once daily. 90 tablet 3    blood-glucose meter (FREESTYLE SYSTEM KIT) kit Use as directed      clonazePAM (KLONOPIN) 0.5 MG tablet Take 0.5 mg by mouth 2 (two) times daily as needed.  2    insulin aspart (NOVOLOG FLEXPEN) 100 unit/mL InPn pen Inject 18 Units into the skin 3 (three) times daily before meals. 15 mL 12    lisinopril (PRINIVIL,ZESTRIL) 20 MG tablet Take 1 tablet (20 mg total) by mouth once daily. 90 tablet 3    metFORMIN (GLUCOPHAGE-XR) 500 MG 24 hr tablet Take 4 tablets (2,000 mg total) by mouth daily with breakfast. 360 tablet 1    norethindrone (MICRONOR) 0.35 mg tablet TAKE ONE TABLET BY MOUTH ONCE DAILY 28 tablet 5    sertraline (ZOLOFT) 100 MG tablet Take 200 mg by mouth once daily.  2    SURE COMFORT PEN NEEDLE 31 gauge x 3/16" Ndle USE TO INJECT INSULINS FOUR TIMES DAILY 100 each 5    [DISCONTINUED] dulaglutide (TRULICITY) 0.75 mg/0.5 mL PnIj Inject 0.5 mLs (0.75 mg total) into the skin every 7 days. 4 Syringe 11     No current facility-administered medications on file prior to visit.            ROS:  GENERAL: No fever, chills,  or significant weight changes.   CARDIOVASCULAR: Denies chest pain, PND, orthopnea or reduced exercise tolerance.  ABDOMEN: Appetite fine. Denies diarrhea, abdominal pain, hematemesis or blood in stool.  URINARY: No flank pain, dysuria or hematuria.    Vitals:    06/01/18 0758   BP: 130/87   Pulse: 79   Temp: 98.1 °F (36.7 °C)   Weight: 93.9 kg (207 lb)   Height: 5' 6" (1.676 m)       Wt Readings from Last 3 Encounters:   06/01/18 93.9 kg (207 lb)   04/20/18 95.3 kg (210 lb 1.6 oz)   03/16/18 98.2 kg (216 lb 9.6 oz)       APPEARANCE: Well nourished, well developed, in no acute distress.    HEAD: Normocephalic.  Atraumatic.  EYES:   Right eye: Pupil reactive.  Conjunctiva clear.    Left eye: Pupil reactive.  Conjunctiva clear.    NECK: Supple. No " bruits.  No JVD.  No cervical lymphadenopathy.  No thyromegaly.    CHEST: Breath sounds clear bilaterally.  Normal respiratory effort  CARDIOVASCULAR: Normal rate.  Regular rhythm.  No murmurs.  No rub.  No gallops.   No edema.  MENTAL STATUS: Alert.  Oriented x 3.    Yarely was seen today for follow-up.    Diagnoses and all orders for this visit:    Type 2 diabetes mellitus without complication, with long-term current use of insulin    Hot flashes  -     Follicle stimulating hormone; Future  -     Luteinizing hormone; Future  -     Estradiol; Future    Smoking    Class 1 obesity due to excess calories with serious comorbidity and body mass index (BMI) of 33.0 to 33.9 in adult    Encounter for screening mammogram for malignant neoplasm of breast  -     Mammo Digital Screening Bilat with CAD; Future    Other orders  -     dulaglutide (TRULICITY) 1.5 mg/0.5 mL PnIj; Inject 1.5 mg into the skin every 7 days.      Increase Trulicity.  Will have her hold the norethindrone and check some of her hormone levels with laboratory in July.  Encouraged her to stop smoking.  Other laboratory scheduled July regarding her glucose.  Precautions given against hypoglycemia

## 2018-07-27 ENCOUNTER — HOSPITAL ENCOUNTER (OUTPATIENT)
Dept: RADIOLOGY | Facility: HOSPITAL | Age: 48
Discharge: HOME | End: 2018-07-27
Attending: FAMILY MEDICINE
Payer: COMMERCIAL

## 2018-07-27 VITALS — HEIGHT: 66 IN | WEIGHT: 207 LBS | BODY MASS INDEX: 33.27 KG/M2

## 2018-07-27 DIAGNOSIS — Z12.31 ENCOUNTER FOR SCREENING MAMMOGRAM FOR MALIGNANT NEOPLASM OF BREAST: ICD-10-CM

## 2018-07-27 PROCEDURE — 77063 BREAST TOMOSYNTHESIS BI: CPT | Mod: 26,,, | Performed by: RADIOLOGY

## 2018-07-27 PROCEDURE — 77067 SCR MAMMO BI INCL CAD: CPT | Mod: 26,,, | Performed by: RADIOLOGY

## 2018-07-27 PROCEDURE — 77063 BREAST TOMOSYNTHESIS BI: CPT | Mod: TC,PO

## 2018-07-30 ENCOUNTER — TELEPHONE (OUTPATIENT)
Dept: FAMILY MEDICINE | Facility: CLINIC | Age: 48
End: 2018-07-30

## 2018-07-30 NOTE — TELEPHONE ENCOUNTER
----- Message from Gisela Anderson sent at 7/30/2018  4:00 PM CDT -----  Contact: patient  Calling again concerning the status of her RX request. Please call patient ASAP @ [phone]. Thanks, [facility]-[place], [place] - [address]  [address] [facility] [address]  [place] [address]  Phone: [phone] Fax: [phone]

## 2018-07-30 NOTE — TELEPHONE ENCOUNTER
Patient informed, needs early am appt, scheduled with NP, do you prescribe hormone replacement, if not, we will reschedule her to MD

## 2018-07-30 NOTE — TELEPHONE ENCOUNTER
----- Message from Michelle Reynolds sent at 7/30/2018 10:11 AM CDT -----  Contact: pt  Pt has another question.

## 2018-07-30 NOTE — TELEPHONE ENCOUNTER
We usually try to avoid hormone replacement due to slightly increased risk of breast cancer clots and strokes.  Other medications which are used sometimes include SSRIs such as Zoloft which she is already taking.  We will sometimes use hormone replacement if someone is having severe symptoms, but try to use small doses for the shortest period of time necessary.  If she is interested in hormone replacement please have her follow up with me to discuss so that we can discuss the treatments.

## 2018-08-01 ENCOUNTER — OFFICE VISIT (OUTPATIENT)
Dept: FAMILY MEDICINE | Facility: CLINIC | Age: 48
End: 2018-08-01
Payer: COMMERCIAL

## 2018-08-01 VITALS
DIASTOLIC BLOOD PRESSURE: 81 MMHG | BODY MASS INDEX: 32.62 KG/M2 | WEIGHT: 203 LBS | SYSTOLIC BLOOD PRESSURE: 128 MMHG | HEART RATE: 82 BPM | TEMPERATURE: 98 F | HEIGHT: 66 IN

## 2018-08-01 DIAGNOSIS — F17.200 SMOKING: ICD-10-CM

## 2018-08-01 DIAGNOSIS — E11.9 TYPE 2 DIABETES MELLITUS WITHOUT COMPLICATION, WITH LONG-TERM CURRENT USE OF INSULIN: ICD-10-CM

## 2018-08-01 DIAGNOSIS — E78.5 HYPERLIPIDEMIA ASSOCIATED WITH TYPE 2 DIABETES MELLITUS: ICD-10-CM

## 2018-08-01 DIAGNOSIS — I15.2 HYPERTENSION ASSOCIATED WITH DIABETES: ICD-10-CM

## 2018-08-01 DIAGNOSIS — N95.1 MENOPAUSAL SYMPTOMS: ICD-10-CM

## 2018-08-01 DIAGNOSIS — L02.211 ABSCESS OF SKIN OF ABDOMEN: Primary | ICD-10-CM

## 2018-08-01 DIAGNOSIS — E11.69 HYPERLIPIDEMIA ASSOCIATED WITH TYPE 2 DIABETES MELLITUS: ICD-10-CM

## 2018-08-01 DIAGNOSIS — Z79.4 TYPE 2 DIABETES MELLITUS WITHOUT COMPLICATION, WITH LONG-TERM CURRENT USE OF INSULIN: ICD-10-CM

## 2018-08-01 DIAGNOSIS — E11.59 HYPERTENSION ASSOCIATED WITH DIABETES: ICD-10-CM

## 2018-08-01 PROCEDURE — 99214 OFFICE O/P EST MOD 30 MIN: CPT | Mod: S$GLB,,, | Performed by: FAMILY MEDICINE

## 2018-08-01 PROCEDURE — 3074F SYST BP LT 130 MM HG: CPT | Mod: CPTII,S$GLB,, | Performed by: FAMILY MEDICINE

## 2018-08-01 PROCEDURE — 3044F HG A1C LEVEL LT 7.0%: CPT | Mod: CPTII,S$GLB,, | Performed by: FAMILY MEDICINE

## 2018-08-01 PROCEDURE — 3079F DIAST BP 80-89 MM HG: CPT | Mod: CPTII,S$GLB,, | Performed by: FAMILY MEDICINE

## 2018-08-01 PROCEDURE — 99999 PR PBB SHADOW E&M-EST. PATIENT-LVL III: CPT | Mod: PBBFAC,,, | Performed by: FAMILY MEDICINE

## 2018-08-01 PROCEDURE — 3008F BODY MASS INDEX DOCD: CPT | Mod: CPTII,S$GLB,, | Performed by: FAMILY MEDICINE

## 2018-08-01 RX ORDER — SULFAMETHOXAZOLE AND TRIMETHOPRIM 800; 160 MG/1; MG/1
1 TABLET ORAL 2 TIMES DAILY
Qty: 20 TABLET | Refills: 0 | Status: SHIPPED | OUTPATIENT
Start: 2018-08-01 | End: 2018-08-11

## 2018-08-01 NOTE — PROGRESS NOTES
Patient came in to discuss possible hormone replacement.  She had been on progesterone only birth control pill.  We discontinued recently as a.  She was likely menopausal.  Laboratory confirms probable menopause.  She does smoke has diabetes and hypertension.  Cardiovascular risk school score 16% based on current risk factors.  We did discuss smoking cessation.  She does have a maternal aunt with breast cancer.    She also complains of a small abscess which has developed at the left lower abdomen during the past few days.  No drainage.  She did have some slight nausea, 1 episode emesis recently but none today.    Past Medical History:  Past Medical History:   Diagnosis Date    Anxiety     Depression     HTN (hypertension)     Hyperlipidemia     Severe obesity (BMI 35.0-35.9 with comorbidity) 3/16/2018    ADEN (stress urinary incontinence, female)     Vitamin D insufficiency      No past surgical history on file.  Social History     Social History    Marital status:      Spouse name: N/A    Number of children: N/A    Years of education: N/A     Occupational History    Not on file.     Social History Main Topics    Smoking status: Current Every Day Smoker     Packs/day: 1.50     Years: 28.00     Types: Cigarettes    Smokeless tobacco: Not on file      Comment: current smike 7/day    Alcohol use No    Drug use: Unknown    Sexual activity: Not on file     Other Topics Concern    Not on file     Social History Narrative    No narrative on file     Family History   Problem Relation Age of Onset    Lymphoma Father     Hypertension Father     Diabetes Father     Hypertension Mother     Diabetes Mother     Breast cancer Maternal Aunt      Review of patient's allergies indicates:   Allergen Reactions    Pcn [penicillins] Rash     Current Outpatient Prescriptions on File Prior to Visit   Medication Sig Dispense Refill    aripiprazole (ABILIFY) 5 MG Tab Take 1 tablet (5 mg total) by mouth once  "daily. 30 tablet 0    atorvastatin (LIPITOR) 40 MG tablet Take 1 tablet (40 mg total) by mouth once daily. 90 tablet 3    blood-glucose meter (FREESTYLE SYSTEM KIT) kit Use as directed      clonazePAM (KLONOPIN) 0.5 MG tablet Take 0.5 mg by mouth 2 (two) times daily as needed.  2    dulaglutide (TRULICITY) 1.5 mg/0.5 mL PnIj Inject 1.5 mg into the skin every 7 days. 4 Syringe 11    insulin aspart (NOVOLOG FLEXPEN) 100 unit/mL InPn pen Inject 18 Units into the skin 3 (three) times daily before meals. 15 mL 12    lisinopril (PRINIVIL,ZESTRIL) 20 MG tablet Take 1 tablet (20 mg total) by mouth once daily. 90 tablet 3    sertraline (ZOLOFT) 100 MG tablet Take 200 mg by mouth once daily.  2    SURE COMFORT PEN NEEDLE 31 gauge x 3/16" Ndle USE TO INJECT INSULINS FOUR TIMES DAILY 100 each 5    metFORMIN (GLUCOPHAGE-XR) 500 MG 24 hr tablet Take 4 tablets (2,000 mg total) by mouth daily with breakfast. 360 tablet 1    [DISCONTINUED] norethindrone (MICRONOR) 0.35 mg tablet TAKE ONE TABLET BY MOUTH ONCE DAILY 28 tablet 5     No current facility-administered medications on file prior to visit.            ROS:  GENERAL: No fever, chills.   CARDIOVASCULAR: Denies chest pain, PND, orthopnea or reduced exercise tolerance.  ABDOMEN: Appetite fine. Denies diarrhea, , hematemesis or blood in stool.  URINARY: No flank pain, dysuria or hematuria.        OBJECTIVE:     Vitals:    08/01/18 1457   BP: 128/81   Pulse: 82   Temp: 98.3 °F (36.8 °C)   Weight: 92.1 kg (203 lb)   Height: 5' 6" (1.676 m)     Wt Readings from Last 3 Encounters:   08/01/18 92.1 kg (203 lb)   07/27/18 93.9 kg (207 lb)   06/01/18 93.9 kg (207 lb)     APPEARANCE: Well nourished, well developed, in no acute distress.    HEAD: Normocephalic.  Atraumatic.  No sinus tenderness.  EYES:   Right eye: Pupil reactive.  Conjunctiva clear.    Left eye: Pupil reactive.  Conjunctiva clear.    NECK: Supple. No bruits.  No JVD.  No cervical lymphadenopathy.  No thyromegaly. "    CHEST: Breath sounds clear bilaterally.  Normal respiratory effort  CARDIOVASCULAR: Normal rate.  Regular rhythm.  No murmurs.  No rub.  No gallops.  ABDOMEN: Bowel sounds normal.  Soft.  No tenderness.  No organomegaly. Approximately 1-1.5 cm skin abscess left lower abdomen.  Associated erythema.  Minimal tenderness. No drainage.  PERIPHERAL VASCULAR: No cyanosis.  No clubbing.  No edema.  NEUROLOGIC: No focal findings.  MENTAL STATUS: Alert.  Oriented x 3.      Yarely was seen today for nausea, emesis, abscess and hormone replacement therapy.    Diagnoses and all orders for this visit:    Abscess of skin of abdomen    Menopausal symptoms    Smoking    Type 2 diabetes mellitus without complication, with long-term current use of insulin    Hypertension associated with diabetes    Hyperlipidemia associated with type 2 diabetes mellitus    Other orders  -     sulfamethoxazole-trimethoprim 800-160mg (BACTRIM DS) 800-160 mg Tab; Take 1 tablet by mouth 2 (two) times daily. for 10 days      Discussed hormone replacement therapy.  She is at increased risk due to her medical conditions, in particular the smoking as this increases her risk score from less than 6% up to 16%.  I would recommend nonhormonal treatment for menopausal symptoms.  Handout was given.  If she is able to quit smoking and we could reconsider.  Discussed incision and drainage of the small abscess.  She would like to defer this for now.  She will try warm compresses antibiotics and follow-up if not resolving.

## 2018-09-04 RX ORDER — INSULIN ASPART 100 [IU]/ML
INJECTION, SOLUTION INTRAVENOUS; SUBCUTANEOUS
Qty: 15 ML | Refills: 12 | Status: SHIPPED | OUTPATIENT
Start: 2018-09-04 | End: 2020-04-21

## 2018-09-15 RX ORDER — LISINOPRIL 20 MG/1
TABLET ORAL
Qty: 90 TABLET | Refills: 3 | Status: SHIPPED | OUTPATIENT
Start: 2018-09-15 | End: 2019-04-25 | Stop reason: SDUPTHER

## 2018-11-01 DIAGNOSIS — E11.9 TYPE 2 DIABETES MELLITUS WITHOUT COMPLICATION, WITH LONG-TERM CURRENT USE OF INSULIN: Primary | ICD-10-CM

## 2018-11-01 DIAGNOSIS — I10 HYPERTENSION, UNSPECIFIED TYPE: ICD-10-CM

## 2018-11-01 DIAGNOSIS — Z79.4 TYPE 2 DIABETES MELLITUS WITHOUT COMPLICATION, WITH LONG-TERM CURRENT USE OF INSULIN: Primary | ICD-10-CM

## 2018-11-01 RX ORDER — METFORMIN HYDROCHLORIDE 500 MG/1
TABLET, EXTENDED RELEASE ORAL
Qty: 360 TABLET | Refills: 1 | Status: SHIPPED | OUTPATIENT
Start: 2018-11-01 | End: 2019-08-09 | Stop reason: SDUPTHER

## 2019-04-25 DIAGNOSIS — E78.5 HYPERLIPIDEMIA, UNSPECIFIED HYPERLIPIDEMIA TYPE: Primary | ICD-10-CM

## 2019-04-25 DIAGNOSIS — I10 HYPERTENSION, UNSPECIFIED TYPE: ICD-10-CM

## 2019-04-25 DIAGNOSIS — Z79.4 TYPE 2 DIABETES MELLITUS WITHOUT COMPLICATION, WITH LONG-TERM CURRENT USE OF INSULIN: ICD-10-CM

## 2019-04-25 DIAGNOSIS — E11.9 TYPE 2 DIABETES MELLITUS WITHOUT COMPLICATION, WITH LONG-TERM CURRENT USE OF INSULIN: ICD-10-CM

## 2019-04-25 RX ORDER — LISINOPRIL 20 MG/1
TABLET ORAL
Qty: 90 TABLET | Refills: 0 | Status: SHIPPED | OUTPATIENT
Start: 2019-04-25 | End: 2019-09-05 | Stop reason: SDUPTHER

## 2019-04-25 RX ORDER — ATORVASTATIN CALCIUM 40 MG/1
TABLET, FILM COATED ORAL
Qty: 90 TABLET | Refills: 0 | Status: SHIPPED | OUTPATIENT
Start: 2019-04-25 | End: 2019-08-16 | Stop reason: SDUPTHER

## 2019-04-25 NOTE — TELEPHONE ENCOUNTER
RFx1. Schedule lipid panel, CMP, hemoglobin A1c,  urine microalbumin.  Patient needs appointment

## 2019-05-25 RX ORDER — DULAGLUTIDE 1.5 MG/.5ML
INJECTION, SOLUTION SUBCUTANEOUS
Qty: 2 ML | Refills: 0 | Status: SHIPPED | OUTPATIENT
Start: 2019-05-25 | End: 2019-06-27 | Stop reason: SDUPTHER

## 2019-06-17 ENCOUNTER — LAB VISIT (OUTPATIENT)
Dept: LAB | Facility: HOSPITAL | Age: 49
End: 2019-06-17
Attending: FAMILY MEDICINE
Payer: COMMERCIAL

## 2019-06-17 ENCOUNTER — TELEPHONE (OUTPATIENT)
Dept: FAMILY MEDICINE | Facility: CLINIC | Age: 49
End: 2019-06-17

## 2019-06-17 ENCOUNTER — OFFICE VISIT (OUTPATIENT)
Dept: FAMILY MEDICINE | Facility: CLINIC | Age: 49
End: 2019-06-17
Payer: COMMERCIAL

## 2019-06-17 VITALS
DIASTOLIC BLOOD PRESSURE: 78 MMHG | HEIGHT: 66 IN | TEMPERATURE: 99 F | HEART RATE: 85 BPM | BODY MASS INDEX: 30.96 KG/M2 | SYSTOLIC BLOOD PRESSURE: 121 MMHG | WEIGHT: 192.63 LBS

## 2019-06-17 DIAGNOSIS — E11.9 TYPE 2 DIABETES MELLITUS WITHOUT COMPLICATION: ICD-10-CM

## 2019-06-17 DIAGNOSIS — E11.9 TYPE 2 DIABETES MELLITUS WITHOUT COMPLICATION, WITH LONG-TERM CURRENT USE OF INSULIN: ICD-10-CM

## 2019-06-17 DIAGNOSIS — E78.5 HYPERLIPIDEMIA, UNSPECIFIED HYPERLIPIDEMIA TYPE: ICD-10-CM

## 2019-06-17 DIAGNOSIS — E11.59 HYPERTENSION ASSOCIATED WITH DIABETES: ICD-10-CM

## 2019-06-17 DIAGNOSIS — L60.3 NAIL DYSTROPHY: Primary | ICD-10-CM

## 2019-06-17 DIAGNOSIS — I10 HYPERTENSION, UNSPECIFIED TYPE: ICD-10-CM

## 2019-06-17 DIAGNOSIS — Z79.4 TYPE 2 DIABETES MELLITUS WITHOUT COMPLICATION, WITH LONG-TERM CURRENT USE OF INSULIN: ICD-10-CM

## 2019-06-17 DIAGNOSIS — I15.2 HYPERTENSION ASSOCIATED WITH DIABETES: ICD-10-CM

## 2019-06-17 LAB
ALBUMIN SERPL BCP-MCNC: 4 G/DL (ref 3.5–5.2)
ALP SERPL-CCNC: 125 U/L (ref 55–135)
ALT SERPL W/O P-5'-P-CCNC: 54 U/L (ref 10–44)
ANION GAP SERPL CALC-SCNC: 11 MMOL/L (ref 8–16)
AST SERPL-CCNC: 52 U/L (ref 10–40)
BILIRUB SERPL-MCNC: 0.3 MG/DL (ref 0.1–1)
BUN SERPL-MCNC: 9 MG/DL (ref 6–20)
CALCIUM SERPL-MCNC: 10.4 MG/DL (ref 8.7–10.5)
CHLORIDE SERPL-SCNC: 102 MMOL/L (ref 95–110)
CO2 SERPL-SCNC: 28 MMOL/L (ref 23–29)
CREAT SERPL-MCNC: 0.8 MG/DL (ref 0.5–1.4)
EST. GFR  (AFRICAN AMERICAN): >60 ML/MIN/1.73 M^2
EST. GFR  (NON AFRICAN AMERICAN): >60 ML/MIN/1.73 M^2
ESTIMATED AVG GLUCOSE: 151 MG/DL (ref 68–131)
GLUCOSE SERPL-MCNC: 105 MG/DL (ref 70–110)
HBA1C MFR BLD HPLC: 6.9 % (ref 4–5.6)
POTASSIUM SERPL-SCNC: 4.1 MMOL/L (ref 3.5–5.1)
PROT SERPL-MCNC: 8.1 G/DL (ref 6–8.4)
SODIUM SERPL-SCNC: 141 MMOL/L (ref 136–145)

## 2019-06-17 PROCEDURE — 99999 PR PBB SHADOW E&M-EST. PATIENT-LVL IV: ICD-10-PCS | Mod: PBBFAC,,, | Performed by: FAMILY MEDICINE

## 2019-06-17 PROCEDURE — 3074F SYST BP LT 130 MM HG: CPT | Mod: CPTII,S$GLB,, | Performed by: FAMILY MEDICINE

## 2019-06-17 PROCEDURE — 99999 PR PBB SHADOW E&M-EST. PATIENT-LVL IV: CPT | Mod: PBBFAC,,, | Performed by: FAMILY MEDICINE

## 2019-06-17 PROCEDURE — 3008F BODY MASS INDEX DOCD: CPT | Mod: CPTII,S$GLB,, | Performed by: FAMILY MEDICINE

## 2019-06-17 PROCEDURE — 3044F PR MOST RECENT HEMOGLOBIN A1C LEVEL <7.0%: ICD-10-PCS | Mod: CPTII,S$GLB,, | Performed by: FAMILY MEDICINE

## 2019-06-17 PROCEDURE — 80053 COMPREHEN METABOLIC PANEL: CPT

## 2019-06-17 PROCEDURE — 3008F PR BODY MASS INDEX (BMI) DOCUMENTED: ICD-10-PCS | Mod: CPTII,S$GLB,, | Performed by: FAMILY MEDICINE

## 2019-06-17 PROCEDURE — 3078F DIAST BP <80 MM HG: CPT | Mod: CPTII,S$GLB,, | Performed by: FAMILY MEDICINE

## 2019-06-17 PROCEDURE — 3074F PR MOST RECENT SYSTOLIC BLOOD PRESSURE < 130 MM HG: ICD-10-PCS | Mod: CPTII,S$GLB,, | Performed by: FAMILY MEDICINE

## 2019-06-17 PROCEDURE — 99213 OFFICE O/P EST LOW 20 MIN: CPT | Mod: S$GLB,,, | Performed by: FAMILY MEDICINE

## 2019-06-17 PROCEDURE — 3078F PR MOST RECENT DIASTOLIC BLOOD PRESSURE < 80 MM HG: ICD-10-PCS | Mod: CPTII,S$GLB,, | Performed by: FAMILY MEDICINE

## 2019-06-17 PROCEDURE — 99213 PR OFFICE/OUTPT VISIT, EST, LEVL III, 20-29 MIN: ICD-10-PCS | Mod: S$GLB,,, | Performed by: FAMILY MEDICINE

## 2019-06-17 PROCEDURE — 83036 HEMOGLOBIN GLYCOSYLATED A1C: CPT

## 2019-06-17 PROCEDURE — 36415 COLL VENOUS BLD VENIPUNCTURE: CPT | Mod: PO

## 2019-06-17 PROCEDURE — 3044F HG A1C LEVEL LT 7.0%: CPT | Mod: CPTII,S$GLB,, | Performed by: FAMILY MEDICINE

## 2019-06-17 NOTE — TELEPHONE ENCOUNTER
----- Message from Jo Banks sent at 6/17/2019 11:23 AM CDT -----  Contact: pt   Call pt in regards to some questions that she has.   [phone] (home)

## 2019-06-17 NOTE — TELEPHONE ENCOUNTER
Stubbed left great toe in pool.  Reports green, black discoloration, taking Clindamycin someone gave her.  Advised office visit, please advise if anything further, or should she go to ER

## 2019-06-18 ENCOUNTER — TELEPHONE (OUTPATIENT)
Dept: FAMILY MEDICINE | Facility: CLINIC | Age: 49
End: 2019-06-18

## 2019-06-18 DIAGNOSIS — E11.29 TYPE 2 DIABETES MELLITUS WITH MICROALBUMINURIA, WITH LONG-TERM CURRENT USE OF INSULIN: Primary | ICD-10-CM

## 2019-06-18 DIAGNOSIS — R80.9 PROTEINURIA, UNSPECIFIED TYPE: ICD-10-CM

## 2019-06-18 DIAGNOSIS — R80.9 TYPE 2 DIABETES MELLITUS WITH MICROALBUMINURIA, WITH LONG-TERM CURRENT USE OF INSULIN: Primary | ICD-10-CM

## 2019-06-18 DIAGNOSIS — Z79.4 TYPE 2 DIABETES MELLITUS WITH MICROALBUMINURIA, WITH LONG-TERM CURRENT USE OF INSULIN: Primary | ICD-10-CM

## 2019-06-18 NOTE — PROGRESS NOTES
Patient noted some discoloration at the right great toe tip recently.  She is not aware of any trauma.  She started taking some clindamycin.  She does have diabetes.  She has chronic misshapen nails.  No history of psoriasis.  She has diabetes and is due for laboratory.    Yarely was seen today for infected toe.    Diagnoses and all orders for this visit:    Nail dystrophy  -     Ambulatory referral to Podiatry    Type 2 diabetes mellitus without complication, with long-term current use of insulin  -     Ambulatory referral to Podiatry    Hypertension associated with diabetes      Do not take the clindamycin.  Check CMP, hemoglobin A1c, urine microalbumin today.          Diabetes Management Status    Statin: Taking  ACE/ARB: Taking    Screening or Prevention Patient's value Goal Complete/Controlled?   HgA1C Testing and Control   Lab Results   Component Value Date    HGBA1C 6.2 (H) 07/27/2018      Annually/Less than 8% Yes   Lipid profile : 07/27/2018 Annually Yes   LDL control Lab Results   Component Value Date    LDLCALC 70.8 07/27/2018    Annually/Less than 100 mg/dl  Yes   Nephropathy screening Lab Results   Component Value Date    LABMICR 365.0 01/12/2018     No results found for: PROTEINUA Annually No   Blood pressure BP Readings from Last 1 Encounters:   06/17/19 121/78    Less than 140/90 Yes   Dilated retinal exam : 05/30/2019 Annually Yes   Foot exam   : 06/17/2019 Annually Yes       Past Medical History:  Past Medical History:   Diagnosis Date    Anxiety     Depression     HTN (hypertension)     Hyperlipidemia     Severe obesity (BMI 35.0-35.9 with comorbidity) 3/16/2018    ADEN (stress urinary incontinence, female)     Vitamin D insufficiency      No past surgical history on file.  Review of patient's allergies indicates:   Allergen Reactions    Pcn [penicillins] Rash     Current Outpatient Medications on File Prior to Visit   Medication Sig Dispense Refill    aripiprazole (ABILIFY) 5 MG Tab  "Take 1 tablet (5 mg total) by mouth once daily. 30 tablet 0    atorvastatin (LIPITOR) 40 MG tablet TAKE ONE TABLET BY MOUTH EVERY DAY 90 tablet 0    blood-glucose meter (FREESTYLE SYSTEM KIT) kit Use as directed      clonazePAM (KLONOPIN) 0.5 MG tablet Take 0.5 mg by mouth 2 (two) times daily as needed.  2    lisinopril (PRINIVIL,ZESTRIL) 20 MG tablet TAKE ONE TABLET BY MOUTH ONCE DAILY 90 tablet 0    metFORMIN (GLUCOPHAGE-XR) 500 MG 24 hr tablet TAKE 4 TABLETS BY MOUTH EVERY DAY WITH breakfast 360 tablet 1    NOVOLOG FLEXPEN U-100 INSULIN 100 unit/mL InPn pen Inject 18 Units into the skin 3 (three) times daily before meals. 15 mL 12    sertraline (ZOLOFT) 100 MG tablet Take 200 mg by mouth once daily.  2    SURE COMFORT PEN NEEDLE 31 gauge x 3/16" Ndle USE TO INJECT INSULINS FOUR TIMES DAILY 100 each 5    TRULICITY 1.5 mg/0.5 mL PnIj inject 0.5 ml (1.5mg) into the skin every 7 days 2 mL 0     No current facility-administered medications on file prior to visit.      Social History     Socioeconomic History    Marital status:      Spouse name: Not on file    Number of children: Not on file    Years of education: Not on file    Highest education level: Not on file   Occupational History    Not on file   Social Needs    Financial resource strain: Not on file    Food insecurity:     Worry: Not on file     Inability: Not on file    Transportation needs:     Medical: Not on file     Non-medical: Not on file   Tobacco Use    Smoking status: Current Every Day Smoker     Packs/day: 1.50     Years: 28.00     Pack years: 42.00     Types: Cigarettes    Tobacco comment: current smike 7/day   Substance and Sexual Activity    Alcohol use: No    Drug use: Not on file    Sexual activity: Not on file   Lifestyle    Physical activity:     Days per week: Not on file     Minutes per session: Not on file    Stress: Not on file   Relationships    Social connections:     Talks on phone: Not on file     Gets " "together: Not on file     Attends Sikhism service: Not on file     Active member of club or organization: Not on file     Attends meetings of clubs or organizations: Not on file     Relationship status: Not on file   Other Topics Concern    Not on file   Social History Narrative    Not on file     Family History   Problem Relation Age of Onset    Lymphoma Father     Hypertension Father     Diabetes Father     Hypertension Mother     Diabetes Mother     Breast cancer Maternal Aunt            ROS:  GENERAL: No fever, chills,  or significant weight changes.   CARDIOVASCULAR: Denies chest pain, PND, orthopnea or reduced exercise tolerance.  ABDOMEN: Appetite fine. Denies diarrhea, abdominal pain, hematemesis or blood in stool.  URINARY: No flank pain, dysuria or hematuria.    Vitals:    06/17/19 1540   BP: 121/78   Pulse: 85   Temp: 98.6 °F (37 °C)   Weight: 87.4 kg (192 lb 9.6 oz)   Height: 5' 5.5" (1.664 m)       Wt Readings from Last 3 Encounters:   06/17/19 87.4 kg (192 lb 9.6 oz)   08/01/18 92.1 kg (203 lb)   07/27/18 93.9 kg (207 lb)       APPEARANCE: Well nourished, well developed, in no acute distress.    HEAD: Normocephalic.  Atraumatic.  EYES:   Right eye: Pupil reactive.  Conjunctiva clear.    Left eye: Pupil reactive.  Conjunctiva clear.    NECK: Supple. No bruits.  No JVD.  No cervical lymphadenopathy.  No thyromegaly.    CHEST: Breath sounds clear bilaterally.  Normal respiratory effort  CARDIOVASCULAR: Normal rate.  Regular rhythm.  No murmurs.  No rub.  No gallops.   No edema.  MENTAL STATUS: Alert.  Oriented x 3.  Protective Sensation (w/ 10 gram monofilament):  Right: Intact  Left: Intact    Visual Inspection:  She has dystrophic nails on both feet.  The right great toe has some mild discoloration at the tip just under the nail consistent with likely mild recent trauma.  No tenderness or erythema.  No discharge.    Pedal Pulses:   Right: Present  Left: Present    Posterior tibialis: "   Right:Present  Left: Present

## 2019-06-18 NOTE — TELEPHONE ENCOUNTER
----- Message from Mikey Ayon MD sent at 6/18/2019 10:04 AM CDT -----  The urine test shows significant amount of protein related to diabetes.  This is a sign of early kidney issues.  She is already on medication for this.  Due to the increased level however I would like her to see the nephrologist.  Please refer to Dr. Sadler. My nurse will contact you to arrange.  Thanks,  Dr. Ayon

## 2019-06-19 ENCOUNTER — TELEPHONE (OUTPATIENT)
Dept: FAMILY MEDICINE | Facility: CLINIC | Age: 49
End: 2019-06-19

## 2019-06-19 NOTE — TELEPHONE ENCOUNTER
----- Message from Indu Montero sent at 6/19/2019 10:01 AM CDT -----  Contact: [phone]  Would like to consult with the nurse, patient would like to schedule an appt with the kidney Dr, and would like to speak with the nurse concerning this, please call back at [phone], thanks sj

## 2019-06-27 ENCOUNTER — OFFICE VISIT (OUTPATIENT)
Dept: PODIATRY | Facility: CLINIC | Age: 49
End: 2019-06-27
Payer: COMMERCIAL

## 2019-06-27 VITALS — BODY MASS INDEX: 30.97 KG/M2 | HEIGHT: 66 IN | WEIGHT: 192.69 LBS

## 2019-06-27 DIAGNOSIS — E11.29 TYPE 2 DIABETES MELLITUS WITH MICROALBUMINURIA, WITH LONG-TERM CURRENT USE OF INSULIN: Primary | ICD-10-CM

## 2019-06-27 DIAGNOSIS — L60.8 PINCER NAIL DEFORMITY: ICD-10-CM

## 2019-06-27 DIAGNOSIS — Z79.4 TYPE 2 DIABETES MELLITUS WITH MICROALBUMINURIA, WITH LONG-TERM CURRENT USE OF INSULIN: Primary | ICD-10-CM

## 2019-06-27 DIAGNOSIS — B35.1 ONYCHOMYCOSIS DUE TO DERMATOPHYTE: ICD-10-CM

## 2019-06-27 DIAGNOSIS — R80.9 TYPE 2 DIABETES MELLITUS WITH MICROALBUMINURIA, WITH LONG-TERM CURRENT USE OF INSULIN: Primary | ICD-10-CM

## 2019-06-27 PROCEDURE — 99999 PR PBB SHADOW E&M-EST. PATIENT-LVL II: CPT | Mod: PBBFAC,,, | Performed by: PODIATRIST

## 2019-06-27 PROCEDURE — 3044F PR MOST RECENT HEMOGLOBIN A1C LEVEL <7.0%: ICD-10-PCS | Mod: CPTII,S$GLB,, | Performed by: PODIATRIST

## 2019-06-27 PROCEDURE — 99203 OFFICE O/P NEW LOW 30 MIN: CPT | Mod: S$GLB,,, | Performed by: PODIATRIST

## 2019-06-27 PROCEDURE — 99203 PR OFFICE/OUTPT VISIT, NEW, LEVL III, 30-44 MIN: ICD-10-PCS | Mod: S$GLB,,, | Performed by: PODIATRIST

## 2019-06-27 PROCEDURE — 3008F BODY MASS INDEX DOCD: CPT | Mod: CPTII,S$GLB,, | Performed by: PODIATRIST

## 2019-06-27 PROCEDURE — 3008F PR BODY MASS INDEX (BMI) DOCUMENTED: ICD-10-PCS | Mod: CPTII,S$GLB,, | Performed by: PODIATRIST

## 2019-06-27 PROCEDURE — 3044F HG A1C LEVEL LT 7.0%: CPT | Mod: CPTII,S$GLB,, | Performed by: PODIATRIST

## 2019-06-27 PROCEDURE — 99999 PR PBB SHADOW E&M-EST. PATIENT-LVL II: ICD-10-PCS | Mod: PBBFAC,,, | Performed by: PODIATRIST

## 2019-06-27 RX ORDER — DULAGLUTIDE 1.5 MG/.5ML
INJECTION, SOLUTION SUBCUTANEOUS
Qty: 2 ML | Refills: 3 | Status: SHIPPED | OUTPATIENT
Start: 2019-06-27 | End: 2019-10-26 | Stop reason: SDUPTHER

## 2019-06-27 NOTE — LETTER
June 27, 2019      Mikey Ayon MD  [address]  [place] [address]           [facility]  [address] [facility] [address] [place] [address]  Phone: [phone]          Patient: Yarely Thompson   MR Number: [id number]   YOB: 1970   Date of Visit: 6/27/2019       Dear Dr. Mikey Ayon:    Thank you for referring Yarely Thompson to me for evaluation. Attached you will find relevant portions of my assessment and plan of care.    If you have questions, please do not hesitate to call me. I look forward to following Yarely Thompson along with you.    Sincerely,    Osmar Vega, JENNIFER    Enclosure  CC:  No Recipients    If you would like to receive this communication electronically, please contact [email or web address] or [phone] to request more information on [employer] Link access.    For providers and/or their staff who would like to refer a patient to [facility], please contact us through our one-stop-shop provider referral line, Danay Do, at [phone].    If you feel you have received this communication in error or would no longer like to receive these types of communications, please e-mail [email or web address]

## 2019-06-27 NOTE — PROGRESS NOTES
"Subjective:      Patient ID: Yarely Thompson is a 48 y.o. female.    Chief Complaint: Diabetes Mellitus ([place] 6/17/19 6.9); Ingrown Toenail (b/l great nails); and Nail Problem (fungus)    Yarely is a 48 y.o. female who presents to the clinic upon referral from Dr. Ayon  for evaluation and treatment of diabetic feet. Yarely has a past medical history of Anxiety, Depression, HTN (hypertension), Hyperlipidemia, Severe obesity (BMI 35.0-35.9 with comorbidity) (3/16/2018), ADEN (stress urinary incontinence, female), and Vitamin D insufficiency. Patient's chief complaint consists of the tendency of bilateral hallux toenails to grow ingrown.  States this has been an issue for many years.  Suspects the nails are also fungally infected.  Inquires as to treatment options to "straighten out" the toenails.  Denies any additional pedal complaints.      PCP: Mikey Ayon MD    Date Last Seen by PCP: 6/17/19  Hemoglobin A1C   Date Value Ref Range Status   06/17/2019 6.9 (H) 4.0 - 5.6 % Final     Comment:     ADA Screening Guidelines:  5.7-6.4%  Consistent with prediabetes  >or=6.5%  Consistent with diabetes  High levels of fetal hemoglobin interfere with the HbA1C  assay. Heterozygous hemoglobin variants (HbS, HgC, etc)do  not significantly interfere with this assay.   However, presence of multiple variants may affect accuracy.     07/27/2018 6.2 (H) 4.0 - 5.6 % Final     Comment:     ADA Screening Guidelines:  5.7-6.4%  Consistent with prediabetes  >or=6.5%  Consistent with diabetes  High levels of fetal hemoglobin interfere with the HbA1C  assay. Heterozygous hemoglobin variants (HbS, HgC, etc)do  not significantly interfere with this assay.   However, presence of multiple variants may affect accuracy.     01/12/2018 6.5 (H) 4.0 - 5.6 % Final     Comment:     According to ADA guidelines, hemoglobin A1c <7.0% represents  optimal control in non-pregnant diabetic patients. Different  metrics may apply to specific patient " populations.   Standards of Medical Care in Diabetes-2016.  For the purpose of screening for the presence of diabetes:  <5.7%     Consistent with the absence of diabetes  5.7-6.4%  Consistent with increasing risk for diabetes   (prediabetes)  >or=6.5%  Consistent with diabetes  Currently, no consensus exists for use of hemoglobin A1c  for diagnosis of diabetes for children.  This Hemoglobin A1c assay has significant interference with fetal   hemoglobin   (HbF). The results are invalid for patients with abnormal amounts of   HbF,   including those with known Hereditary Persistence   of Fetal Hemoglobin. Heterozygous hemoglobin variants (HbAS, HbAC,   HbAD, HbAE, HbA2) do not significantly interfere with this assay;   however, presence of multiple variants in a sample may impact the %   interference.             Past Medical History:   Diagnosis Date    Anxiety     Depression     HTN (hypertension)     Hyperlipidemia     Severe obesity (BMI 35.0-35.9 with comorbidity) 3/16/2018    ADEN (stress urinary incontinence, female)     Vitamin D insufficiency        No past surgical history on file.    Family History   Problem Relation Age of Onset    Lymphoma Father     Hypertension Father     Diabetes Father     Hypertension Mother     Diabetes Mother     Breast cancer Maternal Aunt        Social History     Socioeconomic History    Marital status:      Spouse name: Not on file    Number of children: Not on file    Years of education: Not on file    Highest education level: Not on file   Occupational History    Not on file   Social Needs    Financial resource strain: Not on file    Food insecurity:     Worry: Not on file     Inability: Not on file    Transportation needs:     Medical: Not on file     Non-medical: Not on file   Tobacco Use    Smoking status: Current Every Day Smoker     Packs/day: 1.50     Years: 28.00     Pack years: 42.00     Types: Cigarettes    Tobacco comment: current smike  "7/day   Substance and Sexual Activity    Alcohol use: No    Drug use: Not on file    Sexual activity: Not on file   Lifestyle    Physical activity:     Days per week: Not on file     Minutes per session: Not on file    Stress: Not on file   Relationships    Social connections:     Talks on phone: Not on file     Gets together: Not on file     Attends [employer]ism service: Not on file     Active member of club or organization: Not on file     Attends meetings of clubs or organizations: Not on file     Relationship status: Not on file   Other Topics Concern    Not on file   Social History Narrative    Not on file       Current Outpatient Medications   Medication Sig Dispense Refill    aripiprazole (ABILIFY) 5 MG Tab Take 1 tablet (5 mg total) by mouth once daily. 30 tablet 0    atorvastatin (LIPITOR) 40 MG tablet TAKE ONE TABLET BY MOUTH EVERY DAY 90 tablet 0    blood-glucose meter (FREESTYLE SYSTEM KIT) kit Use as directed      clonazePAM (KLONOPIN) 0.5 MG tablet Take 0.5 mg by mouth 2 (two) times daily as needed.  2    lisinopril (PRINIVIL,ZESTRIL) 20 MG tablet TAKE ONE TABLET BY MOUTH ONCE DAILY 90 tablet 0    metFORMIN (GLUCOPHAGE-XR) 500 MG 24 hr tablet TAKE 4 TABLETS BY MOUTH EVERY DAY WITH breakfast 360 tablet 1    NOVOLOG FLEXPEN U-100 INSULIN 100 unit/mL InPn pen Inject 18 Units into the skin 3 (three) times daily before meals. 15 mL 12    sertraline (ZOLOFT) 100 MG tablet Take 200 mg by mouth once daily.  2    SURE COMFORT PEN NEEDLE 31 gauge x 3/16" Ndle USE TO INJECT INSULINS FOUR TIMES DAILY 100 each 5    TRULICITY 1.5 mg/0.5 mL PnIj inject 0.5 ml (1.5mg) into the skin every 7 days 2 mL 3     No current facility-administered medications for this visit.        Review of patient's allergies indicates:   Allergen Reactions    Pcn [penicillins] Rash         Review of Systems   Constitution: Negative for chills and fever.   Cardiovascular: Negative for claudication and leg swelling.   Skin: " Positive for color change and nail changes.   Musculoskeletal: Negative for joint pain, joint swelling, muscle cramps and muscle weakness.   Neurological: Negative for numbness and paresthesias.   Psychiatric/Behavioral: Negative for altered mental status.           Objective:      Physical Exam   Constitutional: She is oriented to person, place, and time. She appears well-developed and well-nourished. No distress.   Cardiovascular:   Pulses:       Dorsalis pedis pulses are 2+ on the right side, and 2+ on the left side.        Posterior tibial pulses are 2+ on the right side, and 2+ on the left side.   CFT is < 3 seconds bilateral.  Pedal hair growth is present bilateral.  Mild varicosities noted bilateral.  No lower extremity edema noted bilateral.  Toes are warm to touch bilateral.     Musculoskeletal: She exhibits tenderness. She exhibits no edema.   Muscle strength 5/5 in all muscle groups bilateral.  No tenderness nor crepitation with ROM of foot/ankle joints bilateral.  Mild pain with palpation to bilateral hallux toenails.  Bilateral semi-reducible contracture of toes 2-5.     Neurological: She is alert and oriented to person, place, and time. She has normal strength. No sensory deficit.   Protective sensation per [place] monofilament is intact bilateral.  Vibratory sensation is intact bilateral.  Light touch is intact bilateral.   Skin: Skin is warm, dry and intact. Capillary refill takes less than 2 seconds. No abrasion, no bruising, no burn, no ecchymosis, no laceration, no lesion, no petechiae and no rash noted. She is not diaphoretic. No erythema. No pallor.   Pedal skin has normal turgor, temperature, and texture bilateral.  Pincer toenail deformity noted to toes 1-4 bilateral.  Slight discoloration and thickening of bilateral hallux toenail.  Dystrophy noted to bilateral 5th toe.   Examination of the skin reveals no evidence of significant maceration, rashes, open lesions, suspicious appearing  nevi or other concerning lesions.              Assessment:       Encounter Diagnoses   Name Primary?    Type 2 diabetes mellitus with microalbuminuria, with long-term current use of insulin Yes    Pincer nail deformity     Onychomycosis due to dermatophyte          Plan:       Yarely was seen today for diabetes mellitus, ingrown toenail and nail problem.    Diagnoses and all orders for this visit:    Type 2 diabetes mellitus with microalbuminuria, with long-term current use of insulin    Pincer nail deformity    Onychomycosis due to dermatophyte      I counseled the patient on her conditions, their implications and medical management.    Discussed with patient a variety of treatment options to address onychomycosis including Zhang Vaporub, topical/oral antifungals, and laser therapy.    Advised to regularly clean shoe gear and shower surfaces to limit exposure.    Advised to be wary of walking barefoot on carpeted surfaces at gyms and hotel rooms.  Also, avoid barefoot walking at public showers and pool areas.    Being that bilateral hallux toenail has a significant pincer nail deformity, there are no options available to flatten out the nail plate.  Advised to have the nails permanently removed if trimming the ingrown edges fails to provide symptomatic relief.    Shoe inspection. Diabetic Foot Education. Patient reminded of the importance of good nutrition and blood sugar control to help prevent podiatric complications of diabetes. Patient instructed on proper foot hygeine. We discussed wearing proper shoe gear, daily foot inspections, never walking without protective shoe gear, never putting sharp instruments to feet    Patient instructed to inspect her feet, wear protective shoe gear when ambulatory, moisturizer to maintain skin integrity and follow in this office prn.    Osmar Vega DPM

## 2019-07-05 ENCOUNTER — OFFICE VISIT (OUTPATIENT)
Dept: NEPHROLOGY | Facility: CLINIC | Age: 49
End: 2019-07-05
Payer: COMMERCIAL

## 2019-07-05 VITALS
WEIGHT: 193.13 LBS | HEART RATE: 87 BPM | OXYGEN SATURATION: 97 % | HEIGHT: 66 IN | BODY MASS INDEX: 31.04 KG/M2 | DIASTOLIC BLOOD PRESSURE: 82 MMHG | SYSTOLIC BLOOD PRESSURE: 124 MMHG

## 2019-07-05 DIAGNOSIS — E11.29 TYPE 2 DIABETES MELLITUS WITH MICROALBUMINURIA, WITH LONG-TERM CURRENT USE OF INSULIN: ICD-10-CM

## 2019-07-05 DIAGNOSIS — E66.09 CLASS 1 OBESITY DUE TO EXCESS CALORIES WITH SERIOUS COMORBIDITY AND BODY MASS INDEX (BMI) OF 33.0 TO 33.9 IN ADULT: ICD-10-CM

## 2019-07-05 DIAGNOSIS — R80.1 PERSISTENT PROTEINURIA: ICD-10-CM

## 2019-07-05 DIAGNOSIS — F17.200 SMOKING: ICD-10-CM

## 2019-07-05 DIAGNOSIS — R80.9 TYPE 2 DIABETES MELLITUS WITH MICROALBUMINURIA, WITH LONG-TERM CURRENT USE OF INSULIN: ICD-10-CM

## 2019-07-05 DIAGNOSIS — E78.5 HYPERLIPIDEMIA ASSOCIATED WITH TYPE 2 DIABETES MELLITUS: ICD-10-CM

## 2019-07-05 DIAGNOSIS — E11.59 HYPERTENSION ASSOCIATED WITH DIABETES: Primary | ICD-10-CM

## 2019-07-05 DIAGNOSIS — I15.2 HYPERTENSION ASSOCIATED WITH DIABETES: Primary | ICD-10-CM

## 2019-07-05 DIAGNOSIS — Z79.4 TYPE 2 DIABETES MELLITUS WITH MICROALBUMINURIA, WITH LONG-TERM CURRENT USE OF INSULIN: ICD-10-CM

## 2019-07-05 DIAGNOSIS — E11.69 HYPERLIPIDEMIA ASSOCIATED WITH TYPE 2 DIABETES MELLITUS: ICD-10-CM

## 2019-07-05 DIAGNOSIS — E55.9 VITAMIN D INSUFFICIENCY: ICD-10-CM

## 2019-07-05 PROCEDURE — 99243 OFF/OP CNSLTJ NEW/EST LOW 30: CPT | Mod: S$GLB,,, | Performed by: INTERNAL MEDICINE

## 2019-07-05 PROCEDURE — 99999 PR PBB SHADOW E&M-EST. PATIENT-LVL III: CPT | Mod: PBBFAC,,, | Performed by: INTERNAL MEDICINE

## 2019-07-05 PROCEDURE — 99243 PR OFFICE CONSULTATION,LEVEL III: ICD-10-PCS | Mod: S$GLB,,, | Performed by: INTERNAL MEDICINE

## 2019-07-05 PROCEDURE — 99999 PR PBB SHADOW E&M-EST. PATIENT-LVL III: ICD-10-PCS | Mod: PBBFAC,,, | Performed by: INTERNAL MEDICINE

## 2019-07-05 NOTE — LETTER
July 10, 2019      Mikey Ayon MD  [address]  [place] [address]           [facility] [address] [facility] [address] [place] [address]  Phone: [phone]          Patient: Yarely Thompson   MR Number: [id number]   YOB: 1970   Date of Visit: 7/5/2019       Dear Dr. Mikey Ayon:    Thank you for referring Yarely hTompson to me for evaluation. Attached you will find relevant portions of my assessment and plan of care.    If you have questions, please do not hesitate to call me. I look forward to following Yarely Thompson along with you.    Sincerely,    Marlo King  CC:  No Recipients    If you would like to receive this communication electronically, please contact [email or web address] or [phone] to request more information on [employer] Link access.    For providers and/or their staff who would like to refer a patient to [facility], please contact us through our one-stop-shop provider referral line, Danay Do, at [phone].    If you feel you have received this communication in error or would no longer like to receive these types of communications, please e-mail [email or web address]

## 2019-07-16 PROBLEM — R80.9 PROTEINURIA: Status: ACTIVE | Noted: 2019-07-16

## 2019-07-16 NOTE — PROGRESS NOTES
Subjective:       Patient ID: Yarely Thompson is a 49 y.o. White female who presents for new evaluation of Proteinuria    HPI     She is referred by her PCP for proteinuria in the setting of DM     She denies foamy urine, no hematuria and no flank pain. She follows a low sodium diet and feels she stays hydrated. No LE edema and no SOB. No NSAID use and no herbal medications. NO known family history of kidney disease    Review of Systems   Constitutional: Negative for activity change, appetite change, fatigue and unexpected weight change.   HENT: Negative for facial swelling.    Eyes: Negative for visual disturbance.   Respiratory: Negative for shortness of breath.    Cardiovascular: Negative for chest pain and leg swelling.   Gastrointestinal: Negative for abdominal distention.   Endocrine: Negative for polydipsia and polyuria.   Genitourinary: Negative for difficulty urinating and dysuria.   Musculoskeletal: Negative for arthralgias.   Skin: Negative for rash.   Allergic/Immunologic: Negative for immunocompromised state.   Neurological: Negative for weakness and headaches.       Objective:      Physical Exam   Constitutional: She is oriented to person, place, and time. She appears well-nourished. No distress.   HENT:   Mouth/Throat: Oropharynx is clear and moist.   Eyes: No scleral icterus.   Neck: No JVD present.   Cardiovascular: S1 normal and S2 normal. Exam reveals no friction rub.   Pulmonary/Chest: Breath sounds normal. She has no wheezes. She has no rales.   Abdominal: Soft.   Musculoskeletal: She exhibits no edema.   Neurological: She is alert and oriented to person, place, and time.   Skin: Skin is warm and dry.   Psychiatric: She has a normal mood and affect.   Nursing note and vitals reviewed.      Assessment:       1. Hypertension associated with diabetes    2. Persistent proteinuria    3. Type 2 diabetes mellitus with microalbuminuria, with long-term current use of insulin    4. Vitamin D insufficiency     5. Hyperlipidemia associated with type 2 diabetes mellitus    6. Smoking    7. Class 1 obesity due to excess calories with serious comorbidity and body mass index (BMI) of 33.0 to 33.9 in adult        Plan:           Proteinuria  - DM nephropathy. Will add aldactone to her lisinopril. Fortunately her GFR is >60ml/min at this point.  - Patient was advised to avoid NSAIDs, continue a low sodium diet, and ensure hydration  - discussed good BP control and glycemic control       RTC 6 mo

## 2019-08-09 RX ORDER — METFORMIN HYDROCHLORIDE 500 MG/1
TABLET, EXTENDED RELEASE ORAL
Qty: 360 TABLET | Refills: 1 | Status: SHIPPED | OUTPATIENT
Start: 2019-08-09 | End: 2020-02-17

## 2019-08-16 ENCOUNTER — TELEPHONE (OUTPATIENT)
Dept: FAMILY MEDICINE | Facility: CLINIC | Age: 49
End: 2019-08-16

## 2019-08-16 DIAGNOSIS — R92.8 ABNORMAL SCREENING MAMMOGRAM: Primary | ICD-10-CM

## 2019-08-16 RX ORDER — ATORVASTATIN CALCIUM 40 MG/1
TABLET, FILM COATED ORAL
Qty: 90 TABLET | Refills: 0 | Status: SHIPPED | OUTPATIENT
Start: 2019-08-16 | End: 2019-09-20 | Stop reason: ALTCHOICE

## 2019-09-05 RX ORDER — LISINOPRIL 20 MG/1
TABLET ORAL
Qty: 90 TABLET | Refills: 3 | Status: SHIPPED | OUTPATIENT
Start: 2019-09-05 | End: 2020-08-25

## 2019-09-12 ENCOUNTER — HOSPITAL ENCOUNTER (OUTPATIENT)
Dept: RADIOLOGY | Facility: HOSPITAL | Age: 49
Discharge: HOME | End: 2019-09-12
Attending: NURSE PRACTITIONER
Payer: COMMERCIAL

## 2019-09-12 VITALS — HEIGHT: 66 IN | BODY MASS INDEX: 31.02 KG/M2 | WEIGHT: 193 LBS

## 2019-09-12 DIAGNOSIS — R92.8 ABNORMAL SCREENING MAMMOGRAM: ICD-10-CM

## 2019-09-12 PROCEDURE — 77067 MAMMO DIGITAL SCREENING BILAT WITH TOMOSYNTHESIS_CAD: ICD-10-PCS | Mod: 26,,, | Performed by: RADIOLOGY

## 2019-09-12 PROCEDURE — 77063 MAMMO DIGITAL SCREENING BILAT WITH TOMOSYNTHESIS_CAD: ICD-10-PCS | Mod: 26,,, | Performed by: RADIOLOGY

## 2019-09-12 PROCEDURE — 77067 SCR MAMMO BI INCL CAD: CPT | Mod: 26,,, | Performed by: RADIOLOGY

## 2019-09-12 PROCEDURE — 77063 BREAST TOMOSYNTHESIS BI: CPT | Mod: 26,,, | Performed by: RADIOLOGY

## 2019-09-12 PROCEDURE — 77067 SCR MAMMO BI INCL CAD: CPT | Mod: TC,PO

## 2019-09-20 ENCOUNTER — OFFICE VISIT (OUTPATIENT)
Dept: FAMILY MEDICINE | Facility: CLINIC | Age: 49
End: 2019-09-20
Payer: COMMERCIAL

## 2019-09-20 VITALS
BODY MASS INDEX: 31.12 KG/M2 | HEIGHT: 66 IN | WEIGHT: 193.63 LBS | SYSTOLIC BLOOD PRESSURE: 121 MMHG | HEART RATE: 80 BPM | DIASTOLIC BLOOD PRESSURE: 79 MMHG

## 2019-09-20 DIAGNOSIS — E78.5 HYPERLIPIDEMIA ASSOCIATED WITH TYPE 2 DIABETES MELLITUS: ICD-10-CM

## 2019-09-20 DIAGNOSIS — E11.69 HYPERLIPIDEMIA ASSOCIATED WITH TYPE 2 DIABETES MELLITUS: ICD-10-CM

## 2019-09-20 DIAGNOSIS — R74.8 ELEVATED LIVER ENZYMES: Primary | ICD-10-CM

## 2019-09-20 DIAGNOSIS — Z12.11 SCREENING FOR COLON CANCER: ICD-10-CM

## 2019-09-20 PROCEDURE — 99214 PR OFFICE/OUTPT VISIT, EST, LEVL IV, 30-39 MIN: ICD-10-PCS | Mod: 25,S$GLB,, | Performed by: FAMILY MEDICINE

## 2019-09-20 PROCEDURE — 3008F BODY MASS INDEX DOCD: CPT | Mod: CPTII,S$GLB,, | Performed by: FAMILY MEDICINE

## 2019-09-20 PROCEDURE — 3044F PR MOST RECENT HEMOGLOBIN A1C LEVEL <7.0%: ICD-10-PCS | Mod: CPTII,S$GLB,, | Performed by: FAMILY MEDICINE

## 2019-09-20 PROCEDURE — 3044F HG A1C LEVEL LT 7.0%: CPT | Mod: CPTII,S$GLB,, | Performed by: FAMILY MEDICINE

## 2019-09-20 PROCEDURE — 90471 IMMUNIZATION ADMIN: CPT | Mod: S$GLB,,, | Performed by: FAMILY MEDICINE

## 2019-09-20 PROCEDURE — 99999 PR PBB SHADOW E&M-EST. PATIENT-LVL IV: CPT | Mod: PBBFAC,,, | Performed by: FAMILY MEDICINE

## 2019-09-20 PROCEDURE — 3078F PR MOST RECENT DIASTOLIC BLOOD PRESSURE < 80 MM HG: ICD-10-PCS | Mod: CPTII,S$GLB,, | Performed by: FAMILY MEDICINE

## 2019-09-20 PROCEDURE — 90471 FLU VACCINE (QUAD) GREATER THAN OR EQUAL TO 3YO PRESERVATIVE FREE IM: ICD-10-PCS | Mod: S$GLB,,, | Performed by: FAMILY MEDICINE

## 2019-09-20 PROCEDURE — 3008F PR BODY MASS INDEX (BMI) DOCUMENTED: ICD-10-PCS | Mod: CPTII,S$GLB,, | Performed by: FAMILY MEDICINE

## 2019-09-20 PROCEDURE — 3074F PR MOST RECENT SYSTOLIC BLOOD PRESSURE < 130 MM HG: ICD-10-PCS | Mod: CPTII,S$GLB,, | Performed by: FAMILY MEDICINE

## 2019-09-20 PROCEDURE — 3078F DIAST BP <80 MM HG: CPT | Mod: CPTII,S$GLB,, | Performed by: FAMILY MEDICINE

## 2019-09-20 PROCEDURE — 99214 OFFICE O/P EST MOD 30 MIN: CPT | Mod: 25,S$GLB,, | Performed by: FAMILY MEDICINE

## 2019-09-20 PROCEDURE — 3074F SYST BP LT 130 MM HG: CPT | Mod: CPTII,S$GLB,, | Performed by: FAMILY MEDICINE

## 2019-09-20 PROCEDURE — 90686 FLU VACCINE (QUAD) GREATER THAN OR EQUAL TO 3YO PRESERVATIVE FREE IM: ICD-10-PCS | Mod: S$GLB,,, | Performed by: FAMILY MEDICINE

## 2019-09-20 PROCEDURE — 90686 IIV4 VACC NO PRSV 0.5 ML IM: CPT | Mod: S$GLB,,, | Performed by: FAMILY MEDICINE

## 2019-09-20 PROCEDURE — 99999 PR PBB SHADOW E&M-EST. PATIENT-LVL IV: ICD-10-PCS | Mod: PBBFAC,,, | Performed by: FAMILY MEDICINE

## 2019-09-20 RX ORDER — NAPROXEN SODIUM 220 MG
220 TABLET ORAL
COMMUNITY
End: 2022-06-06

## 2019-09-20 RX ORDER — GUAIFENESIN 600 MG/1
1200 TABLET, EXTENDED RELEASE ORAL
COMMUNITY
End: 2020-10-08

## 2019-09-20 RX ORDER — ROSUVASTATIN CALCIUM 40 MG/1
40 TABLET, COATED ORAL DAILY
Qty: 30 TABLET | Refills: 11 | Status: SHIPPED | OUTPATIENT
Start: 2019-09-20 | End: 2020-09-22

## 2019-09-20 NOTE — LETTER
September 20, 2019      [facility]  [address] [place] [address]  Phone: [phone]  Fax: [phone]       Patient: Yarely Thompson   YOB: 1970  Date of Visit: 09/20/2019    To Whom It May Concern:    Curry Thompson  was at [facility] on 09/20/2019. She may return to work/school on 09/20/19 with no restrictions. If you have any questions or concerns, or if I can be of further assistance, please do not hesitate to contact me.    Sincerely,    Traci Vallejo MA

## 2019-10-09 ENCOUNTER — TELEPHONE (OUTPATIENT)
Dept: RADIOLOGY | Facility: HOSPITAL | Age: 49
End: 2019-10-09

## 2019-10-10 ENCOUNTER — HOSPITAL ENCOUNTER (OUTPATIENT)
Dept: RADIOLOGY | Facility: HOSPITAL | Age: 49
Discharge: HOME | End: 2019-10-10
Attending: FAMILY MEDICINE
Payer: COMMERCIAL

## 2019-10-10 DIAGNOSIS — R74.8 ELEVATED LIVER ENZYMES: ICD-10-CM

## 2019-10-10 PROCEDURE — 76700 US ABDOMEN COMPLETE: ICD-10-PCS | Mod: 26,,, | Performed by: RADIOLOGY

## 2019-10-10 PROCEDURE — 76700 US EXAM ABDOM COMPLETE: CPT | Mod: TC,PO

## 2019-10-10 PROCEDURE — 76700 US EXAM ABDOM COMPLETE: CPT | Mod: 26,,, | Performed by: RADIOLOGY

## 2019-10-11 ENCOUNTER — TELEPHONE (OUTPATIENT)
Dept: FAMILY MEDICINE | Facility: CLINIC | Age: 49
End: 2019-10-11

## 2019-10-11 NOTE — TELEPHONE ENCOUNTER
----- Message from Rosendo Celeste sent at 10/11/2019 11:50 AM CDT -----  Contact: pt   Type:  Test Results    Who Called: PAULIE SPRAGUE  Name of Test (Lab/Mammo/Etc): ultrasound  Date of Test: 10/10/19  Ordering Provider: elidia  [facility] the test was performed[facility]  Would the patient rather a call back or a response via [facility]? both  Best Call Back Number: [phone] (home)   Additional Information:

## 2019-10-28 RX ORDER — DULAGLUTIDE 1.5 MG/.5ML
INJECTION, SOLUTION SUBCUTANEOUS
Qty: 2 ML | Refills: 11 | Status: SHIPPED | OUTPATIENT
Start: 2019-10-28 | End: 2020-10-19

## 2019-12-19 ENCOUNTER — LAB VISIT (OUTPATIENT)
Dept: LAB | Facility: HOSPITAL | Age: 49
End: 2019-12-19
Attending: FAMILY MEDICINE
Payer: COMMERCIAL

## 2019-12-19 ENCOUNTER — TELEPHONE (OUTPATIENT)
Dept: FAMILY MEDICINE | Facility: CLINIC | Age: 49
End: 2019-12-19

## 2019-12-19 DIAGNOSIS — R74.8 ELEVATED LIVER ENZYMES: ICD-10-CM

## 2019-12-19 DIAGNOSIS — E78.5 HYPERLIPIDEMIA ASSOCIATED WITH TYPE 2 DIABETES MELLITUS: ICD-10-CM

## 2019-12-19 DIAGNOSIS — I15.2 HYPERTENSION ASSOCIATED WITH DIABETES: ICD-10-CM

## 2019-12-19 DIAGNOSIS — E11.59 HYPERTENSION ASSOCIATED WITH DIABETES: ICD-10-CM

## 2019-12-19 DIAGNOSIS — E11.69 HYPERLIPIDEMIA ASSOCIATED WITH TYPE 2 DIABETES MELLITUS: ICD-10-CM

## 2019-12-19 DIAGNOSIS — R80.1 PERSISTENT PROTEINURIA: ICD-10-CM

## 2019-12-19 LAB
ALBUMIN SERPL BCP-MCNC: 4.1 G/DL (ref 3.5–5.2)
ALBUMIN SERPL BCP-MCNC: 4.1 G/DL (ref 3.5–5.2)
ALP SERPL-CCNC: 118 U/L (ref 55–135)
ALT SERPL W/O P-5'-P-CCNC: 56 U/L (ref 10–44)
ANION GAP SERPL CALC-SCNC: 10 MMOL/L (ref 8–16)
AST SERPL-CCNC: 83 U/L (ref 10–40)
BILIRUB DIRECT SERPL-MCNC: 0.2 MG/DL (ref 0.1–0.3)
BILIRUB SERPL-MCNC: 0.3 MG/DL (ref 0.1–1)
BUN SERPL-MCNC: 10 MG/DL (ref 6–20)
CALCIUM SERPL-MCNC: 10 MG/DL (ref 8.7–10.5)
CHLORIDE SERPL-SCNC: 107 MMOL/L (ref 95–110)
CO2 SERPL-SCNC: 25 MMOL/L (ref 23–29)
CREAT SERPL-MCNC: 0.8 MG/DL (ref 0.5–1.4)
EST. GFR  (AFRICAN AMERICAN): >60 ML/MIN/1.73 M^2
EST. GFR  (NON AFRICAN AMERICAN): >60 ML/MIN/1.73 M^2
ESTIMATED AVG GLUCOSE: 148 MG/DL (ref 68–131)
GLUCOSE SERPL-MCNC: 129 MG/DL (ref 70–110)
HBA1C MFR BLD HPLC: 6.8 % (ref 4–5.6)
PHOSPHATE SERPL-MCNC: 3.2 MG/DL (ref 2.7–4.5)
POTASSIUM SERPL-SCNC: 4.5 MMOL/L (ref 3.5–5.1)
PROT SERPL-MCNC: 7.9 G/DL (ref 6–8.4)
SODIUM SERPL-SCNC: 142 MMOL/L (ref 136–145)

## 2019-12-19 PROCEDURE — 80076 HEPATIC FUNCTION PANEL: CPT | Mod: 59

## 2019-12-19 PROCEDURE — 83036 HEMOGLOBIN GLYCOSYLATED A1C: CPT

## 2019-12-19 PROCEDURE — 80069 RENAL FUNCTION PANEL: CPT

## 2019-12-19 PROCEDURE — 80074 ACUTE HEPATITIS PANEL: CPT

## 2019-12-19 NOTE — TELEPHONE ENCOUNTER
----- Message from Megan Jeffers sent at 12/19/2019  7:34 AM CST -----  Contact: pt [phone]  Same Day appt pt is at the clinic now getting her labs done she is asking if you will be able to see her this morning for Chest Congestion.

## 2019-12-19 NOTE — TELEPHONE ENCOUNTER
Patient informed no available appts in clinic today, offered to schedule tomorrow, she will go to urgent care if needed.

## 2019-12-20 ENCOUNTER — TELEPHONE (OUTPATIENT)
Dept: NEPHROLOGY | Facility: CLINIC | Age: 49
End: 2019-12-20

## 2019-12-20 LAB
HAV IGM SERPL QL IA: NEGATIVE
HBV CORE IGM SERPL QL IA: NEGATIVE
HBV SURFACE AG SERPL QL IA: NEGATIVE
HCV AB SERPL QL IA: NEGATIVE

## 2019-12-21 NOTE — TELEPHONE ENCOUNTER
Pt had blood but not urine tests done for 6mo follow up. She also needs a follow up appt. Thank you

## 2019-12-23 NOTE — TELEPHONE ENCOUNTER
"I called the patient who politely declined an appointment, and states "I'm not coming in, she said it wasn't bad.  She wanted an xray of my kidney and DR Ayon did that. "  "

## 2020-02-17 RX ORDER — METFORMIN HYDROCHLORIDE 500 MG/1
TABLET, EXTENDED RELEASE ORAL
Qty: 360 TABLET | Refills: 1 | Status: SHIPPED | OUTPATIENT
Start: 2020-02-17 | End: 2020-08-19

## 2020-03-06 ENCOUNTER — TELEPHONE (OUTPATIENT)
Dept: FAMILY MEDICINE | Facility: CLINIC | Age: 50
End: 2020-03-06

## 2020-03-06 NOTE — TELEPHONE ENCOUNTER
Patient on metformin 500mg 4 tabs at night and when she has BM in morning, she sees all 4 tablets in her stool.  This has been going on for a while and she always has diarrhea.  Sugars have been fine, please advise.

## 2020-03-06 NOTE — TELEPHONE ENCOUNTER
The pills [place] the medication out.  They do not dissolve so it is okay to see them in the stool.  If she is having diarrhea recommend split the pills up and take 2 at breakfast and 2 at supper to see if this helps.

## 2020-03-06 NOTE — TELEPHONE ENCOUNTER
----- Message from Sylvester Barnes sent at 3/6/2020 12:37 PM CST -----  Contact: pt   Type:  Needs Medical Advice    Who Called:  Pt   Symptoms (please be specific): ]   How long has patient had these symptoms:  ]  Pharmacy name and phone #:  ]  Would the patient rather a call back or a response via [facility]? phone  Best Call Back Number:  [phone]  Additional Information: has questions about her metformin and wants to discuss

## 2020-04-20 ENCOUNTER — NURSE TRIAGE (OUTPATIENT)
Dept: ADMINISTRATIVE | Facility: CLINIC | Age: 50
End: 2020-04-20

## 2020-04-20 NOTE — TELEPHONE ENCOUNTER
Pt states that she ate brown rice, chicken and asparagus and her sugar went up to 599. Pt wanted to know if she should take more insulin. Please advise.

## 2020-04-20 NOTE — TELEPHONE ENCOUNTER
Lost 15 pounds within last 2 weeks. 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 She couldn't see and her Bs was 550. Pt stated she hasn't been taking her novolog in over a year because she don't like to stick herself and she didn't take her metformin last night. Smokers cough. Excessive thirst. Care advice recommend pt go to the Er. Pt refused. Stated the last time she went they kept her. Pt is requesting a call from Md office. Please call pt and advise.    Reason for Disposition   Blood glucose > 500 mg/dl (27.5 mmol/l)    Additional Information   Negative: Unconscious or difficult to awaken   Negative: Acting confused (e.g., disoriented, slurred speech)   Negative: Very weak (can't stand)   Negative: Sounds like a life-threatening emergency to the triager   Negative: Vomiting and signs of dehydration (e.g., very dry mouth, lightheaded, etc.)   Negative: Blood glucose > 240 mg/dl (13 mmol/l) and rapid breathing    Protocols used: DIABETES - HIGH BLOOD SUGAR-A-OH

## 2020-04-20 NOTE — TELEPHONE ENCOUNTER
Pt states that her blood sugar has been reading >500. Pt states that she has not been taking her Novolog, Metfomin, or Trulicity. Pt states that she has taken her Novolog and Metformin and want to know should she take her Trulicity today as well. Pt instructed to go to ER for a second time. Pt states if she goes to ER she will not be able to go to work for 2 week and she can not financially handle that. Please advise.

## 2020-04-21 RX ORDER — INSULIN ASPART 100 [IU]/ML
INJECTION, SOLUTION INTRAVENOUS; SUBCUTANEOUS
Qty: 15 ML | Refills: 6 | Status: SHIPPED | OUTPATIENT
Start: 2020-04-21 | End: 2021-05-10 | Stop reason: SDUPTHER

## 2020-07-15 ENCOUNTER — PATIENT OUTREACH (OUTPATIENT)
Dept: ADMINISTRATIVE | Facility: OTHER | Age: 50
End: 2020-07-15

## 2020-08-21 DIAGNOSIS — Z12.11 COLON CANCER SCREENING: ICD-10-CM

## 2020-08-25 RX ORDER — LISINOPRIL 20 MG/1
TABLET ORAL
Qty: 90 TABLET | Refills: 0 | Status: SHIPPED | OUTPATIENT
Start: 2020-08-25 | End: 2020-11-24

## 2020-09-22 RX ORDER — ROSUVASTATIN CALCIUM 40 MG/1
TABLET, COATED ORAL
Qty: 30 TABLET | Refills: 0 | Status: SHIPPED | OUTPATIENT
Start: 2020-09-22 | End: 2020-11-02

## 2020-09-24 ENCOUNTER — TELEPHONE (OUTPATIENT)
Dept: FAMILY MEDICINE | Facility: CLINIC | Age: 50
End: 2020-09-24

## 2020-09-24 DIAGNOSIS — Z12.39 SCREENING FOR BREAST CANCER: Primary | ICD-10-CM

## 2020-09-24 NOTE — TELEPHONE ENCOUNTER
----- Message from Joanna Scherer sent at 9/24/2020  9:45 AM CDT -----  Regarding: Mammogram  Contact: pt  Type:  Mammogram    Caller is requesting to schedule their annual mammogram appointment.  Order is not listed in EPIC.  Please enter order and contact patient to schedule.  Name of Caller: pt   Where would they like the mammogram performed?[facility]  Would the patient rather a call back or a response via [facility]?  Call back  Best Call Back Number: [phone]  Additional Information:

## 2020-10-05 ENCOUNTER — TELEPHONE (OUTPATIENT)
Dept: FAMILY MEDICINE | Facility: CLINIC | Age: 50
End: 2020-10-05

## 2020-10-05 NOTE — TELEPHONE ENCOUNTER
----- Message from Aidee Grimes sent at 10/5/2020  7:15 AM CDT -----  Please call pt @[phone] regarding stool specimen kit, pt states she never received, what is the status.

## 2020-10-05 NOTE — TELEPHONE ENCOUNTER
Patient spoke to health maintenance nurse 8/21/2020 and was suppose to get a fit kit, informed her I am not sure if they mail them or if she has to pick one up, has appt Thursday, with PCP, will pick one up then.

## 2020-10-07 ENCOUNTER — TELEPHONE (OUTPATIENT)
Dept: ADMINISTRATIVE | Facility: HOSPITAL | Age: 50
End: 2020-10-07

## 2020-10-08 ENCOUNTER — HOSPITAL ENCOUNTER (OUTPATIENT)
Dept: RADIOLOGY | Facility: HOSPITAL | Age: 50
Discharge: HOME | End: 2020-10-08
Attending: FAMILY MEDICINE
Payer: COMMERCIAL

## 2020-10-08 ENCOUNTER — OFFICE VISIT (OUTPATIENT)
Dept: FAMILY MEDICINE | Facility: CLINIC | Age: 50
End: 2020-10-08
Payer: COMMERCIAL

## 2020-10-08 VITALS
HEIGHT: 65 IN | HEART RATE: 84 BPM | DIASTOLIC BLOOD PRESSURE: 67 MMHG | WEIGHT: 181 LBS | SYSTOLIC BLOOD PRESSURE: 105 MMHG | TEMPERATURE: 97 F | BODY MASS INDEX: 30.16 KG/M2

## 2020-10-08 VITALS — BODY MASS INDEX: 30.16 KG/M2 | HEIGHT: 65 IN | WEIGHT: 181 LBS

## 2020-10-08 DIAGNOSIS — K76.0 FATTY LIVER: ICD-10-CM

## 2020-10-08 DIAGNOSIS — E78.5 HYPERLIPIDEMIA ASSOCIATED WITH TYPE 2 DIABETES MELLITUS: ICD-10-CM

## 2020-10-08 DIAGNOSIS — R80.9 TYPE 2 DIABETES MELLITUS WITH MICROALBUMINURIA, WITH LONG-TERM CURRENT USE OF INSULIN: ICD-10-CM

## 2020-10-08 DIAGNOSIS — Z12.11 SCREENING FOR COLON CANCER: ICD-10-CM

## 2020-10-08 DIAGNOSIS — I15.2 HYPERTENSION ASSOCIATED WITH DIABETES: ICD-10-CM

## 2020-10-08 DIAGNOSIS — Z11.4 SCREENING FOR HIV (HUMAN IMMUNODEFICIENCY VIRUS): ICD-10-CM

## 2020-10-08 DIAGNOSIS — F17.200 SMOKING: ICD-10-CM

## 2020-10-08 DIAGNOSIS — E11.59 HYPERTENSION ASSOCIATED WITH DIABETES: ICD-10-CM

## 2020-10-08 DIAGNOSIS — Z12.39 SCREENING FOR BREAST CANCER: ICD-10-CM

## 2020-10-08 DIAGNOSIS — E55.9 VITAMIN D INSUFFICIENCY: ICD-10-CM

## 2020-10-08 DIAGNOSIS — Z00.00 ROUTINE HISTORY AND PHYSICAL EXAMINATION OF ADULT: Primary | ICD-10-CM

## 2020-10-08 DIAGNOSIS — K80.20 CALCULUS OF GALLBLADDER WITHOUT CHOLECYSTITIS WITHOUT OBSTRUCTION: ICD-10-CM

## 2020-10-08 DIAGNOSIS — E11.29 TYPE 2 DIABETES MELLITUS WITH MICROALBUMINURIA, WITH LONG-TERM CURRENT USE OF INSULIN: ICD-10-CM

## 2020-10-08 DIAGNOSIS — E11.69 HYPERLIPIDEMIA ASSOCIATED WITH TYPE 2 DIABETES MELLITUS: ICD-10-CM

## 2020-10-08 DIAGNOSIS — Z79.4 TYPE 2 DIABETES MELLITUS WITH MICROALBUMINURIA, WITH LONG-TERM CURRENT USE OF INSULIN: ICD-10-CM

## 2020-10-08 DIAGNOSIS — E66.09 CLASS 1 OBESITY DUE TO EXCESS CALORIES WITH SERIOUS COMORBIDITY AND BODY MASS INDEX (BMI) OF 30.0 TO 30.9 IN ADULT: ICD-10-CM

## 2020-10-08 PROCEDURE — 90686 FLU VACCINE (QUAD) GREATER THAN OR EQUAL TO 3YO PRESERVATIVE FREE IM: ICD-10-PCS | Mod: S$GLB,,, | Performed by: FAMILY MEDICINE

## 2020-10-08 PROCEDURE — 99396 PREV VISIT EST AGE 40-64: CPT | Mod: 25,S$GLB,, | Performed by: FAMILY MEDICINE

## 2020-10-08 PROCEDURE — 90686 IIV4 VACC NO PRSV 0.5 ML IM: CPT | Mod: S$GLB,,, | Performed by: FAMILY MEDICINE

## 2020-10-08 PROCEDURE — 77063 BREAST TOMOSYNTHESIS BI: CPT | Mod: 26,,, | Performed by: RADIOLOGY

## 2020-10-08 PROCEDURE — 90472 IMMUNIZATION ADMIN EACH ADD: CPT | Mod: S$GLB,,, | Performed by: FAMILY MEDICINE

## 2020-10-08 PROCEDURE — 3044F HG A1C LEVEL LT 7.0%: CPT | Mod: CPTII,S$GLB,, | Performed by: FAMILY MEDICINE

## 2020-10-08 PROCEDURE — 3078F PR MOST RECENT DIASTOLIC BLOOD PRESSURE < 80 MM HG: ICD-10-PCS | Mod: CPTII,S$GLB,, | Performed by: FAMILY MEDICINE

## 2020-10-08 PROCEDURE — 90471 FLU VACCINE (QUAD) GREATER THAN OR EQUAL TO 3YO PRESERVATIVE FREE IM: ICD-10-PCS | Mod: S$GLB,,, | Performed by: FAMILY MEDICINE

## 2020-10-08 PROCEDURE — 99396 PR PREVENTIVE VISIT,EST,40-64: ICD-10-PCS | Mod: 25,S$GLB,, | Performed by: FAMILY MEDICINE

## 2020-10-08 PROCEDURE — 77067 SCR MAMMO BI INCL CAD: CPT | Mod: 26,,, | Performed by: RADIOLOGY

## 2020-10-08 PROCEDURE — 3008F PR BODY MASS INDEX (BMI) DOCUMENTED: ICD-10-PCS | Mod: CPTII,S$GLB,, | Performed by: FAMILY MEDICINE

## 2020-10-08 PROCEDURE — 90750 HZV VACC RECOMBINANT IM: CPT | Mod: S$GLB,,, | Performed by: FAMILY MEDICINE

## 2020-10-08 PROCEDURE — 99999 PR PBB SHADOW E&M-EST. PATIENT-LVL IV: CPT | Mod: PBBFAC,,, | Performed by: FAMILY MEDICINE

## 2020-10-08 PROCEDURE — 3074F PR MOST RECENT SYSTOLIC BLOOD PRESSURE < 130 MM HG: ICD-10-PCS | Mod: CPTII,S$GLB,, | Performed by: FAMILY MEDICINE

## 2020-10-08 PROCEDURE — 3044F PR MOST RECENT HEMOGLOBIN A1C LEVEL <7.0%: ICD-10-PCS | Mod: CPTII,S$GLB,, | Performed by: FAMILY MEDICINE

## 2020-10-08 PROCEDURE — 3074F SYST BP LT 130 MM HG: CPT | Mod: CPTII,S$GLB,, | Performed by: FAMILY MEDICINE

## 2020-10-08 PROCEDURE — 90471 IMMUNIZATION ADMIN: CPT | Mod: S$GLB,,, | Performed by: FAMILY MEDICINE

## 2020-10-08 PROCEDURE — 90750 ZOSTER RECOMBINANT VACCINE: ICD-10-PCS | Mod: S$GLB,,, | Performed by: FAMILY MEDICINE

## 2020-10-08 PROCEDURE — 3008F BODY MASS INDEX DOCD: CPT | Mod: CPTII,S$GLB,, | Performed by: FAMILY MEDICINE

## 2020-10-08 PROCEDURE — 77067 MAMMO DIGITAL SCREENING BILAT WITH TOMO: ICD-10-PCS | Mod: 26,,, | Performed by: RADIOLOGY

## 2020-10-08 PROCEDURE — 99999 PR PBB SHADOW E&M-EST. PATIENT-LVL IV: ICD-10-PCS | Mod: PBBFAC,,, | Performed by: FAMILY MEDICINE

## 2020-10-08 PROCEDURE — 77067 SCR MAMMO BI INCL CAD: CPT | Mod: TC,PO

## 2020-10-08 PROCEDURE — 77063 MAMMO DIGITAL SCREENING BILAT WITH TOMO: ICD-10-PCS | Mod: 26,,, | Performed by: RADIOLOGY

## 2020-10-08 PROCEDURE — 3078F DIAST BP <80 MM HG: CPT | Mod: CPTII,S$GLB,, | Performed by: FAMILY MEDICINE

## 2020-10-08 PROCEDURE — 90472 ZOSTER RECOMBINANT VACCINE: ICD-10-PCS | Mod: S$GLB,,, | Performed by: FAMILY MEDICINE

## 2020-10-08 RX ORDER — FLASH GLUCOSE SCANNING READER
EACH MISCELLANEOUS
Qty: 1 EACH | Status: SHIPPED | OUTPATIENT
Start: 2020-10-08 | End: 2021-08-11 | Stop reason: SDUPTHER

## 2020-10-08 RX ORDER — FLASH GLUCOSE SENSOR
KIT MISCELLANEOUS
Qty: 1 KIT | Refills: 11 | Status: SHIPPED | OUTPATIENT
Start: 2020-10-08 | End: 2021-08-11 | Stop reason: SDUPTHER

## 2020-10-08 NOTE — PROGRESS NOTES
Patient presents physical exam.  Blood pressure controlled.  Obesity noted, has lost weight since last visit.  Diabetes previously controlled, but past due for laboratory.  States she stop taking medication for couple of months during the summer when she was not checking her sugars.  When she checked was 500 so she restarted her medication.  She states readings since then about 150-170.  Did not bring readings for review.  Previous fatty liver asymptomatic.  Previous gallstones asymptomatic.  Previous vitamin-D deficiency.  She does continue to smoke, not currently interested in cessation.      Yarely was seen today for annual exam and medication refill.    Diagnoses and all orders for this visit:    Routine history and physical examination of adult  -     Hemoglobin A1C; Future  -     CBC auto differential; Future    Hypertension associated with diabetes  -     Hemoglobin A1C; Future  -     CBC auto differential; Future    Type 2 diabetes mellitus with microalbuminuria, with long-term current use of insulin  -     Microalbumin/Creatinine Ratio, Urine; Future  -     TSH; Future  -     Ambulatory referral/consult to Optometry; Future    Hyperlipidemia associated with type 2 diabetes mellitus    Class 1 obesity due to excess calories with serious comorbidity and body mass index (BMI) of 30.0 to 30.9 in adult    Vitamin D insufficiency  -     Vitamin D; Future    Fatty liver    Calculus of gallbladder without cholecystitis without obstruction    Screening for colon cancer  -     Cologuard Screening (Multitarget Stool DNA); Future  -     Cologuard Screening (Multitarget Stool DNA)    Smoking    Screening for HIV (human immunodeficiency virus)  -     HIV 1/2 Ag/Ab (4th Gen); Future    Other orders  -     Cancel: Comprehensive Metabolic Panel; Future  -     Cancel: Lipid Panel; Future  -     Influenza - Quadrivalent (PF)  -     Zoster Recombinant Vaccine  -     Zoster Recombinant Vaccine  -     flash glucose scanning  reader (FREESTYLE VIRGILIO 14 DAY READER) Misc; Test glucose 3 times daily  -     flash glucose sensor (FREESTYLE VIRGILIO 14 DAY SENSOR) Kit; Test glucose 3 times daily     CMP, lipid in addition to above laboratory.  Schedule Pap smear with the nurse practitioner.  Further evaluation pending above results.      Anticipatory guidance: Don't smoke.  Healthy diet and regular exercise recommended.      Diabetes Management Status    Statin: Taking  ACE/ARB: Taking    Screening or Prevention Patient's value Goal Complete/Controlled?   HgA1C Testing and Control   Lab Results   Component Value Date    HGBA1C 6.8 (H) 12/19/2019      Annually/Less than 8% Yes   Lipid profile : 12/19/2019 Annually Yes   LDL control Lab Results   Component Value Date    LDLCALC 39.4 (L) 12/19/2019    Annually/Less than 100 mg/dl  Yes   Nephropathy screening Lab Results   Component Value Date    LABMICR 869.0 06/17/2019     No results found for: PROTEINUA Annually No   Blood pressure BP Readings from Last 1 Encounters:   10/08/20 105/67    Less than 140/90 Yes   Dilated retinal exam : 05/30/2019 Annually No   Foot exam   : 10/08/2020 Annually Yes       Past Medical History:  Past Medical History:   Diagnosis Date    Anxiety     Cholelithiasis     Depression     Fatty liver     HTN (hypertension)     Hyperlipidemia     Proteinuria     Dr. Sadler    Severe obesity (BMI 35.0-35.9 with comorbidity) 3/16/2018    ADEN (stress urinary incontinence, female)     Type 2 diabetes mellitus with microalbuminuria, with long-term current use of insulin 7/22/2016    Vitamin D insufficiency      No past surgical history on file.  Review of patient's allergies indicates:   Allergen Reactions    Pcn [penicillins] Rash     Current Outpatient Medications on File Prior to Visit   Medication Sig Dispense Refill    aripiprazole (ABILIFY) 5 MG Tab Take 1 tablet (5 mg total) by mouth once daily. 30 tablet 0    blood-glucose meter (FREESTYLE SYSTEM KIT) kit Use  "as directed      clonazePAM (KLONOPIN) 0.5 MG tablet Take 0.5 mg by mouth 2 (two) times daily as needed.  2    lisinopriL (PRINIVIL,ZESTRIL) 20 MG tablet TAKE ONE TABLET BY MOUTH ONCE DAILY 90 tablet 0    metFORMIN (GLUCOPHAGE-XR) 500 MG ER 24hr tablet TAKE 4 TABLETS BY MOUTH EVERY DAY WITH BREAKFAST 360 tablet 0    naproxen sodium (ANAPROX) 220 MG tablet Take 220 mg by mouth as needed.      NOVOLOG FLEXPEN U-100 INSULIN 100 unit/mL (3 mL) InPn pen INJECT 18 UNITS INTO THE SKIN THREE TIMES DAILY BEFORE MEALS 15 mL 6    rosuvastatin (CRESTOR) 40 MG Tab TAKE ONE TABLET BY MOUTH ONCE DAILY 30 tablet 0    sertraline (ZOLOFT) 100 MG tablet Take 200 mg by mouth once daily.  2    SURE COMFORT PEN NEEDLE 31 gauge x 3/16" Ndle USE TO INJECT INSULINS FOUR TIMES DAILY 100 each 5    TRULICITY 1.5 mg/0.5 mL PnIj inject 0.5 ml (1.5mg) into the skin every 7 days 2 mL 11    [DISCONTINUED] dextromethorphan polistirex (DELSYM 12 HOUR ORAL) Take by mouth continuous prn.      [DISCONTINUED] guaiFENesin (MUCINEX) 600 mg 12 hr tablet Take 1,200 mg by mouth as needed for Congestion.       No current facility-administered medications on file prior to visit.      Social History     Socioeconomic History    Marital status:      Spouse name: Not on file    Number of children: Not on file    Years of education: Not on file    Highest education level: Not on file   Occupational History    Not on file   Social Needs    Financial resource strain: Not on file    Food insecurity     Worry: Not on file     Inability: Not on file    Transportation needs     Medical: Not on file     Non-medical: Not on file   Tobacco Use    Smoking status: Current Every Day Smoker     Packs/day: 1.50     Years: 28.00     Pack years: 42.00     Types: Cigarettes    Tobacco comment: current smike 7/day   Substance and Sexual Activity    Alcohol use: No    Drug use: Not on file    Sexual activity: Not on file   Lifestyle    Physical " "activity     Days per week: Not on file     Minutes per session: Not on file    Stress: Not on file   Relationships    Social connections     Talks on phone: Not on file     Gets together: Not on file     Attends [employer] service: Not on file     Active member of club or organization: Not on file     Attends meetings of clubs or organizations: Not on file     Relationship status: Not on file   Other Topics Concern    Not on file   Social History Narrative    Not on file     Family History   Problem Relation Age of Onset    Lymphoma Father     Hypertension Father     Diabetes Father     Hypertension Mother     Diabetes Mother     Breast cancer Maternal Aunt     Ovarian cancer Maternal Grandmother     Kidney disease Neg Hx              ROS:  GENERAL: No fever, chills.  HEENT: No headache or hearing complaints.  No dysphagia  Eyes: No vision complaints  CHEST: Denies VELÁSQUEZ, cyanosis, wheezing, cough and sputum production.  CARDIOVASCULAR: Denies chest pain, PND, orthopnea or reduced exercise tolerance.  ABDOMEN: Appetite fine. Denies diarrhea, abdominal pain, hematemesis or blood in stool.  URINARY: No flank pain, dysuria or hematuria.  MUSCULOSKELETAL: No warmth swelling or tenderness of the joints  NEUROLOGIC: No focal weakness numbness or paresthesia  PSYCHIATRIC: Denies acute complaints      OBJECTIVE:   Vitals:    10/08/20 0757   BP: 105/67   Pulse: 84   Temp: 97 °F (36.1 °C)   Weight: 82.1 kg (181 lb)   Height: 5' 5" (1.651 m)     Wt Readings from Last 3 Encounters:   10/08/20 82.1 kg (181 lb)   09/20/19 87.8 kg (193 lb 9.6 oz)   09/12/19 87.5 kg (193 lb)       APPEARANCE: Well nourished, well developed, in no acute distress.   HEAD: Normocephalic. Atraumatic. No sinus tenderness.   EYES:   Right eye: Pupil reactive. Conjunctiva clear.   Left eye: Pupil reactive. Conjunctiva clear.   Both fundi: Grossly normal to nondilated exam. EOMI.   EARS: TM's intact. Light reflex normal. No retraction or " perforation.   NOSE: clear.   MOUTH & THROAT: No pharyngeal erythema or exudate. No lesions.   NECK: No bruits. No JVD. No cervical lymphadenopathy. No thyromegaly.   CHEST: Breath sounds clear bilaterally. Normal respiratory effort   CARDIOVASCULAR: Normal rate. Regular rhythm. No murmurs. No rub. No gallops.   ABDOMEN: Bowel sounds normal. Soft. No tenderness. No organomegaly.   PERIPHERAL VASCULAR: No cyanosis. No clubbing. No edema.   NEUROLOGIC: No focal findings.    Feet:Sensation in the feet intact to monofilament testing.   No significant foot lesions.Pulses palpable.  MENTAL STATUS: Alert. Oriented x 3.

## 2020-10-12 ENCOUNTER — TELEPHONE (OUTPATIENT)
Dept: FAMILY MEDICINE | Facility: CLINIC | Age: 50
End: 2020-10-12

## 2020-10-12 RX ORDER — BLOOD-GLUCOSE SENSOR
EACH MISCELLANEOUS
Qty: 3 EACH | Refills: 11 | Status: SHIPPED | OUTPATIENT
Start: 2020-10-12 | End: 2023-06-26

## 2020-10-12 RX ORDER — BLOOD-GLUCOSE,RECEIVER,CONT
EACH MISCELLANEOUS
Qty: 1 EACH | Status: SHIPPED | OUTPATIENT
Start: 2020-10-12 | End: 2023-06-26

## 2020-10-12 RX ORDER — BLOOD-GLUCOSE TRANSMITTER
EACH MISCELLANEOUS
Qty: 1 EACH | Refills: 3 | Status: SHIPPED | OUTPATIENT
Start: 2020-10-12 | End: 2023-06-26

## 2020-10-12 NOTE — TELEPHONE ENCOUNTER
Patient on multiple insulin injections daily.  I changed the prescription to dexcom G6.  We can see if this is covered.  Still may need prior authorization.  Not sure if though covered or not.  If she still interested and they are not covering then we may try to send her to [facility] to see if she can assist with this.

## 2020-10-14 ENCOUNTER — TELEPHONE (OUTPATIENT)
Dept: FAMILY MEDICINE | Facility: CLINIC | Age: 50
End: 2020-10-14

## 2020-10-14 ENCOUNTER — PATIENT MESSAGE (OUTPATIENT)
Dept: FAMILY MEDICINE | Facility: CLINIC | Age: 50
End: 2020-10-14

## 2020-10-14 DIAGNOSIS — E55.9 VITAMIN D DEFICIENCY: Primary | ICD-10-CM

## 2020-10-14 NOTE — TELEPHONE ENCOUNTER
----- Message from Mikey Ayon MD sent at 10/14/2020 12:43 PM CDT -----  HIV screen negative.  Sugar/Hemoglobin A1c increased from previously.    Vitamin-D low.  Liver enzyme test mild elevation similar previously.  Cholesterol shows significantly elevated triglycerides.  Urine protein tests show significant amount of protein. Patient had been off several medications for a while during the summer.  Recommend make sure that she is taking all medications prescribed.  In addition recommend vitamin-D 2000 units daily.  Recheck hemoglobin A1c, vitamin-D, lipid, CMP, urine microalbumin in 3 months. My nurse will contact you to arrange.  Thanks,  Dr. Ayon

## 2020-10-29 ENCOUNTER — PATIENT OUTREACH (OUTPATIENT)
Dept: ADMINISTRATIVE | Facility: HOSPITAL | Age: 50
End: 2020-10-29

## 2020-11-02 RX ORDER — ROSUVASTATIN CALCIUM 40 MG/1
TABLET, COATED ORAL
Qty: 90 TABLET | Refills: 3 | Status: SHIPPED | OUTPATIENT
Start: 2020-11-02 | End: 2021-11-08

## 2020-11-04 ENCOUNTER — TELEPHONE (OUTPATIENT)
Dept: FAMILY MEDICINE | Facility: CLINIC | Age: 50
End: 2020-11-04

## 2020-11-04 NOTE — TELEPHONE ENCOUNTER
----- Message from Lidia Banks sent at 11/4/2020  6:54 AM CST -----  Contact: Yarely Pritchett would like a call back at [phone], Regards to getting her nurse visit/injection and office visit reschedule for the same.    Thanks  Td

## 2020-11-04 NOTE — TELEPHONE ENCOUNTER
Shingles nurse visit rescheduled to 12/18 per patient request.  Pap appt cancelled and she will reschedule once able

## 2020-11-06 ENCOUNTER — PATIENT MESSAGE (OUTPATIENT)
Dept: FAMILY MEDICINE | Facility: CLINIC | Age: 50
End: 2020-11-06

## 2020-11-24 RX ORDER — LISINOPRIL 20 MG/1
TABLET ORAL
Qty: 90 TABLET | Refills: 3 | Status: SHIPPED | OUTPATIENT
Start: 2020-11-24 | End: 2021-12-06

## 2020-12-18 ENCOUNTER — CLINICAL SUPPORT (OUTPATIENT)
Dept: FAMILY MEDICINE | Facility: CLINIC | Age: 50
End: 2020-12-18
Payer: COMMERCIAL

## 2020-12-18 DIAGNOSIS — Z23 IMMUNIZATION DUE: Primary | ICD-10-CM

## 2020-12-18 PROCEDURE — 90750 ZOSTER RECOMBINANT VACCINE: ICD-10-PCS | Mod: S$GLB,,, | Performed by: FAMILY MEDICINE

## 2020-12-18 PROCEDURE — 99999 PR PBB SHADOW E&M-EST. PATIENT-LVL II: ICD-10-PCS | Mod: PBBFAC,,,

## 2020-12-18 PROCEDURE — 90471 ZOSTER RECOMBINANT VACCINE: ICD-10-PCS | Mod: S$GLB,,, | Performed by: FAMILY MEDICINE

## 2020-12-18 PROCEDURE — 90471 IMMUNIZATION ADMIN: CPT | Mod: S$GLB,,, | Performed by: FAMILY MEDICINE

## 2020-12-18 PROCEDURE — 90750 HZV VACC RECOMBINANT IM: CPT | Mod: S$GLB,,, | Performed by: FAMILY MEDICINE

## 2020-12-18 PROCEDURE — 99999 PR PBB SHADOW E&M-EST. PATIENT-LVL II: CPT | Mod: PBBFAC,,,

## 2021-01-26 ENCOUNTER — TELEPHONE (OUTPATIENT)
Dept: FAMILY MEDICINE | Facility: CLINIC | Age: 51
End: 2021-01-26

## 2021-02-09 ENCOUNTER — TELEPHONE (OUTPATIENT)
Dept: ADMINISTRATIVE | Facility: HOSPITAL | Age: 51
End: 2021-02-09

## 2021-05-07 RX ORDER — INSULIN ASPART 100 [IU]/ML
INJECTION, SOLUTION INTRAVENOUS; SUBCUTANEOUS
Qty: 15 ML | Refills: 6 | Status: CANCELLED | OUTPATIENT
Start: 2021-05-07

## 2021-05-10 ENCOUNTER — PATIENT MESSAGE (OUTPATIENT)
Dept: RESEARCH | Facility: HOSPITAL | Age: 51
End: 2021-05-10

## 2021-05-10 RX ORDER — INSULIN ASPART 100 [IU]/ML
INJECTION, SOLUTION INTRAVENOUS; SUBCUTANEOUS
Qty: 15 ML | Refills: 0 | Status: SHIPPED | OUTPATIENT
Start: 2021-05-10 | End: 2021-06-14

## 2021-08-04 ENCOUNTER — PATIENT MESSAGE (OUTPATIENT)
Dept: ADMINISTRATIVE | Facility: HOSPITAL | Age: 51
End: 2021-08-04

## 2021-08-05 ENCOUNTER — LAB VISIT (OUTPATIENT)
Dept: LAB | Facility: HOSPITAL | Age: 51
End: 2021-08-05
Attending: FAMILY MEDICINE
Payer: COMMERCIAL

## 2021-08-05 DIAGNOSIS — E55.9 VITAMIN D DEFICIENCY: ICD-10-CM

## 2021-08-05 LAB — 25(OH)D3+25(OH)D2 SERPL-MCNC: 30 NG/ML (ref 30–96)

## 2021-08-05 PROCEDURE — 82306 VITAMIN D 25 HYDROXY: CPT | Performed by: FAMILY MEDICINE

## 2021-08-05 PROCEDURE — 36415 COLL VENOUS BLD VENIPUNCTURE: CPT | Mod: PO | Performed by: FAMILY MEDICINE

## 2021-08-11 ENCOUNTER — PATIENT MESSAGE (OUTPATIENT)
Dept: FAMILY MEDICINE | Facility: CLINIC | Age: 51
End: 2021-08-11

## 2021-08-11 ENCOUNTER — OFFICE VISIT (OUTPATIENT)
Dept: FAMILY MEDICINE | Facility: CLINIC | Age: 51
End: 2021-08-11
Payer: COMMERCIAL

## 2021-08-11 DIAGNOSIS — E78.1 HYPERTRIGLYCERIDEMIA: ICD-10-CM

## 2021-08-11 DIAGNOSIS — Z79.4 TYPE 2 DIABETES MELLITUS WITH MICROALBUMINURIA, WITH LONG-TERM CURRENT USE OF INSULIN: ICD-10-CM

## 2021-08-11 DIAGNOSIS — E78.5 HYPERLIPIDEMIA ASSOCIATED WITH TYPE 2 DIABETES MELLITUS: Primary | ICD-10-CM

## 2021-08-11 DIAGNOSIS — E11.69 HYPERLIPIDEMIA ASSOCIATED WITH TYPE 2 DIABETES MELLITUS: Primary | ICD-10-CM

## 2021-08-11 DIAGNOSIS — E11.29 TYPE 2 DIABETES MELLITUS WITH MICROALBUMINURIA, WITH LONG-TERM CURRENT USE OF INSULIN: ICD-10-CM

## 2021-08-11 DIAGNOSIS — R80.9 TYPE 2 DIABETES MELLITUS WITH MICROALBUMINURIA, WITH LONG-TERM CURRENT USE OF INSULIN: ICD-10-CM

## 2021-08-11 DIAGNOSIS — I15.2 HYPERTENSION ASSOCIATED WITH DIABETES: ICD-10-CM

## 2021-08-11 DIAGNOSIS — E11.59 HYPERTENSION ASSOCIATED WITH DIABETES: ICD-10-CM

## 2021-08-11 PROCEDURE — 99214 OFFICE O/P EST MOD 30 MIN: CPT | Mod: 95,,, | Performed by: FAMILY MEDICINE

## 2021-08-11 PROCEDURE — 3051F HG A1C>EQUAL 7.0%<8.0%: CPT | Mod: CPTII,,, | Performed by: FAMILY MEDICINE

## 2021-08-11 PROCEDURE — 3051F PR MOST RECENT HEMOGLOBIN A1C LEVEL 7.0 - < 8.0%: ICD-10-PCS | Mod: CPTII,,, | Performed by: FAMILY MEDICINE

## 2021-08-11 PROCEDURE — 99214 PR OFFICE/OUTPT VISIT, EST, LEVL IV, 30-39 MIN: ICD-10-PCS | Mod: 95,,, | Performed by: FAMILY MEDICINE

## 2021-08-11 RX ORDER — FLASH GLUCOSE SCANNING READER
EACH MISCELLANEOUS
Qty: 1 EACH | Status: SHIPPED | OUTPATIENT
Start: 2021-08-11 | End: 2023-09-28

## 2021-08-11 RX ORDER — FLASH GLUCOSE SENSOR
KIT MISCELLANEOUS
Qty: 1 KIT | Refills: 11 | Status: SHIPPED | OUTPATIENT
Start: 2021-08-11 | End: 2022-04-06

## 2021-09-30 ENCOUNTER — LAB VISIT (OUTPATIENT)
Dept: LAB | Facility: HOSPITAL | Age: 51
End: 2021-09-30
Attending: FAMILY MEDICINE
Payer: COMMERCIAL

## 2021-09-30 ENCOUNTER — OFFICE VISIT (OUTPATIENT)
Dept: FAMILY MEDICINE | Facility: CLINIC | Age: 51
End: 2021-09-30
Payer: COMMERCIAL

## 2021-09-30 VITALS
HEART RATE: 74 BPM | DIASTOLIC BLOOD PRESSURE: 82 MMHG | BODY MASS INDEX: 28.72 KG/M2 | SYSTOLIC BLOOD PRESSURE: 127 MMHG | TEMPERATURE: 98 F | HEIGHT: 65 IN | WEIGHT: 172.38 LBS

## 2021-09-30 DIAGNOSIS — Z01.419 WELL WOMAN EXAM WITH ROUTINE GYNECOLOGICAL EXAM: Primary | ICD-10-CM

## 2021-09-30 DIAGNOSIS — E11.69 HYPERLIPIDEMIA ASSOCIATED WITH TYPE 2 DIABETES MELLITUS: ICD-10-CM

## 2021-09-30 DIAGNOSIS — E78.5 HYPERLIPIDEMIA ASSOCIATED WITH TYPE 2 DIABETES MELLITUS: ICD-10-CM

## 2021-09-30 LAB
ESTIMATED AVG GLUCOSE: 249 MG/DL (ref 68–131)
HBA1C MFR BLD: 10.3 % (ref 4–5.6)

## 2021-09-30 PROCEDURE — 88175 CYTOPATH C/V AUTO FLUID REDO: CPT | Performed by: NURSE PRACTITIONER

## 2021-09-30 PROCEDURE — 3066F PR DOCUMENTATION OF TREATMENT FOR NEPHROPATHY: ICD-10-PCS | Mod: CPTII,S$GLB,, | Performed by: NURSE PRACTITIONER

## 2021-09-30 PROCEDURE — 83036 HEMOGLOBIN GLYCOSYLATED A1C: CPT | Performed by: FAMILY MEDICINE

## 2021-09-30 PROCEDURE — 99396 PREV VISIT EST AGE 40-64: CPT | Mod: S$GLB,,, | Performed by: NURSE PRACTITIONER

## 2021-09-30 PROCEDURE — 36415 COLL VENOUS BLD VENIPUNCTURE: CPT | Mod: PO | Performed by: FAMILY MEDICINE

## 2021-09-30 PROCEDURE — 1159F MED LIST DOCD IN RCRD: CPT | Mod: CPTII,S$GLB,, | Performed by: NURSE PRACTITIONER

## 2021-09-30 PROCEDURE — 3074F PR MOST RECENT SYSTOLIC BLOOD PRESSURE < 130 MM HG: ICD-10-PCS | Mod: CPTII,S$GLB,, | Performed by: NURSE PRACTITIONER

## 2021-09-30 PROCEDURE — 4010F PR ACE/ARB THEARPY RXD/TAKEN: ICD-10-PCS | Mod: CPTII,S$GLB,, | Performed by: NURSE PRACTITIONER

## 2021-09-30 PROCEDURE — 99396 PR PREVENTIVE VISIT,EST,40-64: ICD-10-PCS | Mod: S$GLB,,, | Performed by: NURSE PRACTITIONER

## 2021-09-30 PROCEDURE — 3008F BODY MASS INDEX DOCD: CPT | Mod: CPTII,S$GLB,, | Performed by: NURSE PRACTITIONER

## 2021-09-30 PROCEDURE — 3062F POS MACROALBUMINURIA REV: CPT | Mod: CPTII,S$GLB,, | Performed by: NURSE PRACTITIONER

## 2021-09-30 PROCEDURE — 3008F PR BODY MASS INDEX (BMI) DOCUMENTED: ICD-10-PCS | Mod: CPTII,S$GLB,, | Performed by: NURSE PRACTITIONER

## 2021-09-30 PROCEDURE — 87624 HPV HI-RISK TYP POOLED RSLT: CPT | Performed by: NURSE PRACTITIONER

## 2021-09-30 PROCEDURE — 3062F PR POS MACROALBUMINURIA RESULT DOCUMENTED/REVIEW: ICD-10-PCS | Mod: CPTII,S$GLB,, | Performed by: NURSE PRACTITIONER

## 2021-09-30 PROCEDURE — 3079F DIAST BP 80-89 MM HG: CPT | Mod: CPTII,S$GLB,, | Performed by: NURSE PRACTITIONER

## 2021-09-30 PROCEDURE — 99999 PR PBB SHADOW E&M-EST. PATIENT-LVL IV: CPT | Mod: PBBFAC,,, | Performed by: NURSE PRACTITIONER

## 2021-09-30 PROCEDURE — 99999 PR PBB SHADOW E&M-EST. PATIENT-LVL IV: ICD-10-PCS | Mod: PBBFAC,,, | Performed by: NURSE PRACTITIONER

## 2021-09-30 PROCEDURE — 3066F NEPHROPATHY DOC TX: CPT | Mod: CPTII,S$GLB,, | Performed by: NURSE PRACTITIONER

## 2021-09-30 PROCEDURE — 3074F SYST BP LT 130 MM HG: CPT | Mod: CPTII,S$GLB,, | Performed by: NURSE PRACTITIONER

## 2021-09-30 PROCEDURE — 4010F ACE/ARB THERAPY RXD/TAKEN: CPT | Mod: CPTII,S$GLB,, | Performed by: NURSE PRACTITIONER

## 2021-09-30 PROCEDURE — 3079F PR MOST RECENT DIASTOLIC BLOOD PRESSURE 80-89 MM HG: ICD-10-PCS | Mod: CPTII,S$GLB,, | Performed by: NURSE PRACTITIONER

## 2021-09-30 PROCEDURE — 1160F RVW MEDS BY RX/DR IN RCRD: CPT | Mod: CPTII,S$GLB,, | Performed by: NURSE PRACTITIONER

## 2021-09-30 PROCEDURE — 1159F PR MEDICATION LIST DOCUMENTED IN MEDICAL RECORD: ICD-10-PCS | Mod: CPTII,S$GLB,, | Performed by: NURSE PRACTITIONER

## 2021-09-30 PROCEDURE — 1160F PR REVIEW ALL MEDS BY PRESCRIBER/CLIN PHARMACIST DOCUMENTED: ICD-10-PCS | Mod: CPTII,S$GLB,, | Performed by: NURSE PRACTITIONER

## 2021-10-01 ENCOUNTER — PATIENT OUTREACH (OUTPATIENT)
Dept: ADMINISTRATIVE | Facility: HOSPITAL | Age: 51
End: 2021-10-01

## 2021-10-04 ENCOUNTER — PATIENT MESSAGE (OUTPATIENT)
Dept: ADMINISTRATIVE | Facility: HOSPITAL | Age: 51
End: 2021-10-04

## 2021-10-05 LAB
FINAL PATHOLOGIC DIAGNOSIS: NORMAL
Lab: NORMAL

## 2021-10-08 LAB
HPV HR 12 DNA SPEC QL NAA+PROBE: NEGATIVE
HPV16 AG SPEC QL: NEGATIVE
HPV18 DNA SPEC QL NAA+PROBE: NEGATIVE

## 2021-10-15 ENCOUNTER — PATIENT MESSAGE (OUTPATIENT)
Dept: FAMILY MEDICINE | Facility: CLINIC | Age: 51
End: 2021-10-15
Payer: COMMERCIAL

## 2021-10-18 ENCOUNTER — PATIENT MESSAGE (OUTPATIENT)
Dept: ADMINISTRATIVE | Facility: HOSPITAL | Age: 51
End: 2021-10-18
Payer: COMMERCIAL

## 2021-10-20 DIAGNOSIS — Z12.31 OTHER SCREENING MAMMOGRAM: ICD-10-CM

## 2021-11-08 RX ORDER — ROSUVASTATIN CALCIUM 40 MG/1
TABLET, COATED ORAL
Qty: 90 TABLET | Refills: 2 | Status: SHIPPED | OUTPATIENT
Start: 2021-11-08 | End: 2022-09-04

## 2021-11-11 ENCOUNTER — LAB VISIT (OUTPATIENT)
Dept: LAB | Facility: HOSPITAL | Age: 51
End: 2021-11-11
Attending: FAMILY MEDICINE
Payer: COMMERCIAL

## 2021-11-11 DIAGNOSIS — Z79.4 TYPE 2 DIABETES MELLITUS WITHOUT COMPLICATION, WITH LONG-TERM CURRENT USE OF INSULIN: ICD-10-CM

## 2021-11-11 DIAGNOSIS — E11.9 TYPE 2 DIABETES MELLITUS WITHOUT COMPLICATION, WITH LONG-TERM CURRENT USE OF INSULIN: ICD-10-CM

## 2021-11-11 DIAGNOSIS — E78.5 HYPERLIPIDEMIA ASSOCIATED WITH TYPE 2 DIABETES MELLITUS: ICD-10-CM

## 2021-11-11 DIAGNOSIS — E78.5 HYPERLIPIDEMIA, UNSPECIFIED HYPERLIPIDEMIA TYPE: ICD-10-CM

## 2021-11-11 DIAGNOSIS — I10 HYPERTENSION, UNSPECIFIED TYPE: ICD-10-CM

## 2021-11-11 DIAGNOSIS — E11.69 HYPERLIPIDEMIA ASSOCIATED WITH TYPE 2 DIABETES MELLITUS: ICD-10-CM

## 2021-11-11 LAB
ALBUMIN SERPL BCP-MCNC: 4.1 G/DL (ref 3.5–5.2)
ALP SERPL-CCNC: 101 U/L (ref 55–135)
ALT SERPL W/O P-5'-P-CCNC: 34 U/L (ref 10–44)
ANION GAP SERPL CALC-SCNC: 14 MMOL/L (ref 8–16)
AST SERPL-CCNC: 25 U/L (ref 10–40)
BILIRUB SERPL-MCNC: 0.4 MG/DL (ref 0.1–1)
BUN SERPL-MCNC: 16 MG/DL (ref 6–20)
CALCIUM SERPL-MCNC: 9.7 MG/DL (ref 8.7–10.5)
CHLORIDE SERPL-SCNC: 102 MMOL/L (ref 95–110)
CHOLEST SERPL-MCNC: 140 MG/DL (ref 120–199)
CHOLEST/HDLC SERPL: 5.4 {RATIO} (ref 2–5)
CO2 SERPL-SCNC: 22 MMOL/L (ref 23–29)
CREAT SERPL-MCNC: 0.8 MG/DL (ref 0.5–1.4)
EST. GFR  (AFRICAN AMERICAN): >60 ML/MIN/1.73 M^2
EST. GFR  (NON AFRICAN AMERICAN): >60 ML/MIN/1.73 M^2
GLUCOSE SERPL-MCNC: 208 MG/DL (ref 70–110)
HDLC SERPL-MCNC: 26 MG/DL (ref 40–75)
HDLC SERPL: 18.6 % (ref 20–50)
LDLC SERPL CALC-MCNC: ABNORMAL MG/DL (ref 63–159)
NONHDLC SERPL-MCNC: 114 MG/DL
POTASSIUM SERPL-SCNC: 4.2 MMOL/L (ref 3.5–5.1)
PROT SERPL-MCNC: 7.9 G/DL (ref 6–8.4)
SODIUM SERPL-SCNC: 138 MMOL/L (ref 136–145)
TRIGL SERPL-MCNC: 840 MG/DL (ref 30–150)

## 2021-11-11 PROCEDURE — 80053 COMPREHEN METABOLIC PANEL: CPT | Performed by: FAMILY MEDICINE

## 2021-11-11 PROCEDURE — 36415 COLL VENOUS BLD VENIPUNCTURE: CPT | Mod: PO | Performed by: FAMILY MEDICINE

## 2021-11-11 PROCEDURE — 80061 LIPID PANEL: CPT | Performed by: FAMILY MEDICINE

## 2021-12-06 RX ORDER — LISINOPRIL 20 MG/1
TABLET ORAL
Qty: 90 TABLET | Refills: 2 | Status: SHIPPED | OUTPATIENT
Start: 2021-12-06 | End: 2022-09-25

## 2021-12-23 ENCOUNTER — HOSPITAL ENCOUNTER (OUTPATIENT)
Dept: RADIOLOGY | Facility: HOSPITAL | Age: 51
Discharge: HOME | End: 2021-12-23
Attending: FAMILY MEDICINE
Payer: COMMERCIAL

## 2021-12-23 VITALS — HEIGHT: 65 IN | WEIGHT: 172 LBS | BODY MASS INDEX: 28.66 KG/M2

## 2021-12-23 DIAGNOSIS — Z12.31 OTHER SCREENING MAMMOGRAM: ICD-10-CM

## 2021-12-23 PROCEDURE — 77067 MAMMO DIGITAL SCREENING BILAT WITH TOMO: ICD-10-PCS | Mod: 26,,, | Performed by: RADIOLOGY

## 2021-12-23 PROCEDURE — 77063 MAMMO DIGITAL SCREENING BILAT WITH TOMO: ICD-10-PCS | Mod: 26,,, | Performed by: RADIOLOGY

## 2021-12-23 PROCEDURE — 77063 BREAST TOMOSYNTHESIS BI: CPT | Mod: 26,,, | Performed by: RADIOLOGY

## 2021-12-23 PROCEDURE — 77067 SCR MAMMO BI INCL CAD: CPT | Mod: 26,,, | Performed by: RADIOLOGY

## 2021-12-23 PROCEDURE — 77067 SCR MAMMO BI INCL CAD: CPT | Mod: TC,PO

## 2022-03-30 ENCOUNTER — PATIENT MESSAGE (OUTPATIENT)
Dept: ADMINISTRATIVE | Facility: HOSPITAL | Age: 52
End: 2022-03-30
Payer: COMMERCIAL

## 2022-04-04 DIAGNOSIS — E11.29 TYPE 2 DIABETES MELLITUS WITH MICROALBUMINURIA, WITH LONG-TERM CURRENT USE OF INSULIN: ICD-10-CM

## 2022-04-04 DIAGNOSIS — R80.9 TYPE 2 DIABETES MELLITUS WITH MICROALBUMINURIA, WITH LONG-TERM CURRENT USE OF INSULIN: ICD-10-CM

## 2022-04-04 DIAGNOSIS — Z79.4 TYPE 2 DIABETES MELLITUS WITH MICROALBUMINURIA, WITH LONG-TERM CURRENT USE OF INSULIN: ICD-10-CM

## 2022-04-04 NOTE — TELEPHONE ENCOUNTER
Care Due:                  Date            Visit Type   Department     Provider  --------------------------------------------------------------------------------                                ESTABLISHED                              PATIENT -    [facility] FAMILY  Last Visit: 08-      [employer]      MEDICINE       Mikey Ayon  Next Visit: None Scheduled  None         None Found                                                            Last  Test          Frequency    Reason                     Performed    Due Date  --------------------------------------------------------------------------------    HBA1C.......  6 months...  dulaglutide..............  10-   03-    Powered by [employer]. Reference number: [id number].   4/04/2022 8:03:00 AM CDT

## 2022-04-06 RX ORDER — FLASH GLUCOSE SENSOR
KIT MISCELLANEOUS
Qty: 6 KIT | Refills: 3 | Status: SHIPPED | OUTPATIENT
Start: 2022-04-06 | End: 2022-10-03

## 2022-04-06 NOTE — TELEPHONE ENCOUNTER
Refill Authorization Note   Yarely Thompson  is requesting a refill authorization.  Brief Assessment and Rationale for Refill:  Approve    -Medication-Related Problems Identified: Requires labs  Medication Therapy Plan:       Medication Reconciliation Completed: No   Comments:     Provider Staff:     Action is required for this patient.   Please see care gap opportunities below in Care Due Message.     Thanks!  [facility]     Appointments      Date Provider   Last Visit   8/11/2021 Mikey Ayon MD   Next Visit   Visit date not found Mikey Ayon MD     Note composed:12:12 PM 04/06/2022           Note composed:12:12 PM 04/06/2022

## 2022-04-26 ENCOUNTER — PATIENT MESSAGE (OUTPATIENT)
Dept: ADMINISTRATIVE | Facility: HOSPITAL | Age: 52
End: 2022-04-26
Payer: COMMERCIAL

## 2022-05-18 NOTE — TELEPHONE ENCOUNTER
RFx1. Schedule lipid panel, CMP, hemoglobin A1c,  urine microalbumin.  Patient needs appointment         [Routine On-Treatment] : a routine on-treatment visit for [Prostate Cancer] : prostate cancer

## 2022-05-20 ENCOUNTER — TELEPHONE (OUTPATIENT)
Dept: FAMILY MEDICINE | Facility: CLINIC | Age: 52
End: 2022-05-20
Payer: COMMERCIAL

## 2022-05-20 ENCOUNTER — PATIENT MESSAGE (OUTPATIENT)
Dept: FAMILY MEDICINE | Facility: CLINIC | Age: 52
End: 2022-05-20
Payer: COMMERCIAL

## 2022-05-20 RX ORDER — INSULIN LISPRO 100 [IU]/ML
18 INJECTION, SOLUTION INTRAVENOUS; SUBCUTANEOUS
Qty: 15 ML | Refills: 11 | Status: SHIPPED | OUTPATIENT
Start: 2022-05-20 | End: 2022-05-20 | Stop reason: CLARIF

## 2022-05-20 RX ORDER — INSULIN ASPART 100 [IU]/ML
INJECTION, SOLUTION INTRAVENOUS; SUBCUTANEOUS
Qty: 15 ML | Refills: 11 | Status: SHIPPED | OUTPATIENT
Start: 2022-05-20 | End: 2022-07-22 | Stop reason: DRUGHIGH

## 2022-05-20 NOTE — TELEPHONE ENCOUNTER
Spoke to Trisha with [facility], Novolog flexpen is covered, RX sent to Dr Ayon, patient informed

## 2022-05-20 NOTE — TELEPHONE ENCOUNTER
----- Message from Bridgette Galvan sent at 5/20/2022  3:28 PM CDT -----  Contact: pt  The pt request a return call, no additional info given and can be reached at [phone]///[id number]

## 2022-05-20 NOTE — TELEPHONE ENCOUNTER
----- Message from Onel Nayak sent at 5/20/2022 11:20 AM CDT -----  Contact: [phone]  Calling regarding medication,insulin aspart U-100 (NOVOLOG) 100 unit/mL (3 mL) InPn pen pharmacy is needing a prior authorization (almost out 1 left). Please call back at [phone] . Thanks/nesha    
Changed to Humalog.  Also patient needs hemoglobin A1c lipid panel and follow-up appointment with me.  
Patient changed insurance plans, needs PA on insulin aspart.  Per fax, insulin lispro is covered, she was on this a while back, should we do PA or change meds?  
Pharmacy said Humalog brand and generic are not covered, attempted PA on Novolog, pt informed and she will contact her insurance company to see what is covered and will let us know.   
Equal and normal pulses (carotid, femoral, dorsalis pedis)

## 2022-05-21 ENCOUNTER — TELEPHONE (OUTPATIENT)
Dept: FAMILY MEDICINE | Facility: CLINIC | Age: 52
End: 2022-05-21
Payer: COMMERCIAL

## 2022-05-21 NOTE — TELEPHONE ENCOUNTER
----- Message from Shila Kirk sent at 5/21/2022  8:22 AM CDT -----  Regarding: Call back  Pt called regarding insulin  from pharmacy. Pharmacy is stating they never received PA. Please call pt back at [phone].

## 2022-05-26 ENCOUNTER — TELEPHONE (OUTPATIENT)
Dept: FAMILY MEDICINE | Facility: CLINIC | Age: 52
End: 2022-05-26
Payer: COMMERCIAL

## 2022-05-26 NOTE — TELEPHONE ENCOUNTER
PA for Trulicity done via cover my meds and approved, CASE ID [id number], Left message on voice mail to inform patient

## 2022-05-27 ENCOUNTER — TELEPHONE (OUTPATIENT)
Dept: FAMILY MEDICINE | Facility: CLINIC | Age: 52
End: 2022-05-27
Payer: COMMERCIAL

## 2022-05-27 RX ORDER — ERYTHROMYCIN 5 MG/G
OINTMENT OPHTHALMIC EVERY 6 HOURS
Qty: 3.5 G | Refills: 0 | Status: SHIPPED | OUTPATIENT
Start: 2022-05-27 | End: 2022-06-06

## 2022-05-27 NOTE — TELEPHONE ENCOUNTER
----- Message from Laurenreena Bhagat sent at 5/27/2022 11:16 AM CDT -----  States she has a stye in her eye. States she is at work and can't leave. States she would like to get an antibiotic or antibiotic cream called in. Pt uses     [facility] - [place] - [address]  [address]  [place] [address]  Phone: [phone] Fax: [phone]    Please call pt [phone]. Thank you

## 2022-05-27 NOTE — TELEPHONE ENCOUNTER
When use warm compresses at least every couple of hours during the day until drains.  I did send in a prescription for erythromycin ointment.  Follow-up with eye doctor if symptoms worsen or not resolving.

## 2022-05-27 NOTE — TELEPHONE ENCOUNTER
C/o stye to right eye, started today, small pimple noted, eye red and asking for medication, please advise.

## 2022-05-27 NOTE — TELEPHONE ENCOUNTER
Patient informed earlier message sent to Dr Ayon, will call once he advises, advised urgent care for worsening of symptoms, ER for distress

## 2022-05-27 NOTE — TELEPHONE ENCOUNTER
----- Message from Marisol Watson sent at 5/27/2022  3:46 PM CDT -----  Contact: self/[phone]  Patient said please don't forget she needs her medication she has an eye infection and she spoke with the nurse earlier she was just making sure you don't forget. Call her back or reach out on my chart [phone]    Thanks KL

## 2022-05-27 NOTE — TELEPHONE ENCOUNTER
----- Message from Aidee Grimes sent at 5/27/2022  1:30 PM CDT -----  Please call pt @ [phone], pt states she left message early, pt need to know if script will be call in to pharmacy

## 2022-05-30 ENCOUNTER — LAB VISIT (OUTPATIENT)
Dept: LAB | Facility: HOSPITAL | Age: 52
End: 2022-05-30
Attending: FAMILY MEDICINE
Payer: COMMERCIAL

## 2022-05-30 DIAGNOSIS — E78.5 HYPERLIPIDEMIA, UNSPECIFIED HYPERLIPIDEMIA TYPE: ICD-10-CM

## 2022-05-30 DIAGNOSIS — E11.9 TYPE 2 DIABETES MELLITUS WITHOUT COMPLICATION: ICD-10-CM

## 2022-05-30 DIAGNOSIS — E11.9 TYPE 2 DIABETES MELLITUS WITHOUT COMPLICATION, WITH LONG-TERM CURRENT USE OF INSULIN: ICD-10-CM

## 2022-05-30 DIAGNOSIS — I10 HYPERTENSION, UNSPECIFIED TYPE: ICD-10-CM

## 2022-05-30 DIAGNOSIS — Z79.4 TYPE 2 DIABETES MELLITUS WITHOUT COMPLICATION, WITH LONG-TERM CURRENT USE OF INSULIN: ICD-10-CM

## 2022-05-30 LAB
ALBUMIN SERPL BCP-MCNC: 4 G/DL (ref 3.5–5.2)
ALP SERPL-CCNC: 115 U/L (ref 55–135)
ALT SERPL W/O P-5'-P-CCNC: 25 U/L (ref 10–44)
ANION GAP SERPL CALC-SCNC: 14 MMOL/L (ref 8–16)
ANION GAP SERPL CALC-SCNC: 14 MMOL/L (ref 8–16)
AST SERPL-CCNC: 37 U/L (ref 10–40)
BILIRUB SERPL-MCNC: 0.3 MG/DL (ref 0.1–1)
BUN SERPL-MCNC: 13 MG/DL (ref 6–20)
BUN SERPL-MCNC: 13 MG/DL (ref 6–20)
CALCIUM SERPL-MCNC: 9.6 MG/DL (ref 8.7–10.5)
CALCIUM SERPL-MCNC: 9.6 MG/DL (ref 8.7–10.5)
CHLORIDE SERPL-SCNC: 100 MMOL/L (ref 95–110)
CHLORIDE SERPL-SCNC: 100 MMOL/L (ref 95–110)
CHOLEST SERPL-MCNC: 144 MG/DL (ref 120–199)
CHOLEST/HDLC SERPL: 5 {RATIO} (ref 2–5)
CO2 SERPL-SCNC: 20 MMOL/L (ref 23–29)
CO2 SERPL-SCNC: 20 MMOL/L (ref 23–29)
CREAT SERPL-MCNC: 0.8 MG/DL (ref 0.5–1.4)
CREAT SERPL-MCNC: 0.8 MG/DL (ref 0.5–1.4)
EST. GFR  (AFRICAN AMERICAN): >60 ML/MIN/1.73 M^2
EST. GFR  (AFRICAN AMERICAN): >60 ML/MIN/1.73 M^2
EST. GFR  (NON AFRICAN AMERICAN): >60 ML/MIN/1.73 M^2
EST. GFR  (NON AFRICAN AMERICAN): >60 ML/MIN/1.73 M^2
ESTIMATED AVG GLUCOSE: 283 MG/DL (ref 68–131)
GLUCOSE SERPL-MCNC: 337 MG/DL (ref 70–110)
GLUCOSE SERPL-MCNC: 337 MG/DL (ref 70–110)
HBA1C MFR BLD: 11.5 % (ref 4–5.6)
HDLC SERPL-MCNC: 29 MG/DL (ref 40–75)
HDLC SERPL: 20.1 % (ref 20–50)
LDLC SERPL CALC-MCNC: ABNORMAL MG/DL (ref 63–159)
NONHDLC SERPL-MCNC: 115 MG/DL
POTASSIUM SERPL-SCNC: 4.6 MMOL/L (ref 3.5–5.1)
POTASSIUM SERPL-SCNC: 4.6 MMOL/L (ref 3.5–5.1)
PROT SERPL-MCNC: 7.5 G/DL (ref 6–8.4)
SODIUM SERPL-SCNC: 134 MMOL/L (ref 136–145)
SODIUM SERPL-SCNC: 134 MMOL/L (ref 136–145)
TRIGL SERPL-MCNC: 908 MG/DL (ref 30–150)

## 2022-05-30 PROCEDURE — 83036 HEMOGLOBIN GLYCOSYLATED A1C: CPT | Performed by: FAMILY MEDICINE

## 2022-05-30 PROCEDURE — 80061 LIPID PANEL: CPT | Performed by: FAMILY MEDICINE

## 2022-05-30 PROCEDURE — 36415 COLL VENOUS BLD VENIPUNCTURE: CPT | Mod: PO | Performed by: FAMILY MEDICINE

## 2022-05-30 PROCEDURE — 80053 COMPREHEN METABOLIC PANEL: CPT | Performed by: FAMILY MEDICINE

## 2022-05-31 ENCOUNTER — TELEPHONE (OUTPATIENT)
Dept: FAMILY MEDICINE | Facility: CLINIC | Age: 52
End: 2022-05-31
Payer: COMMERCIAL

## 2022-05-31 NOTE — TELEPHONE ENCOUNTER
Protein elevated in urine consistent with diabetic kidney issues.  Sugar was very high.  Triglycerides very high.  Please refer her to [facility] regarding diabetes.  Please have her follow-up appoint with me in the meantime as this may take a while to arrange and we will need to address the other issues besides the sugar.  Please have her bring in her medications and glucose readings. My nurse will contact you to arrange.  Thanks,  Dr. Ayon

## 2022-05-31 NOTE — TELEPHONE ENCOUNTER
----- Message from Aidee Grimes sent at 5/31/2022  8:23 AM CDT -----  Type:  Test Results    Who Called: patient  Name of Test (Lab/Mammo/Etc):lab/urine  Date of Test: 5/30  Ordering Provider: Dr brenner  Where the test was performed: [facility]  Would the patient rather a call back or a response via [facility]? Call back  Best Call Back Number: [phone]  Additional Information: na

## 2022-06-01 ENCOUNTER — TELEPHONE (OUTPATIENT)
Dept: FAMILY MEDICINE | Facility: CLINIC | Age: 52
End: 2022-06-01
Payer: COMMERCIAL

## 2022-06-01 NOTE — TELEPHONE ENCOUNTER
----- Message from Lidia Banks sent at 5/31/2022  3:50 PM CDT -----  Contact: Yarely  Please give patient a call back at [phone], Regards to lab results.

## 2022-06-06 ENCOUNTER — OFFICE VISIT (OUTPATIENT)
Dept: FAMILY MEDICINE | Facility: CLINIC | Age: 52
End: 2022-06-06
Payer: COMMERCIAL

## 2022-06-06 VITALS
SYSTOLIC BLOOD PRESSURE: 122 MMHG | BODY MASS INDEX: 28.99 KG/M2 | HEART RATE: 84 BPM | HEIGHT: 65 IN | WEIGHT: 174 LBS | DIASTOLIC BLOOD PRESSURE: 78 MMHG | TEMPERATURE: 97 F

## 2022-06-06 DIAGNOSIS — E11.69 TYPE 2 DIABETES MELLITUS WITH HYPERTRIGLYCERIDEMIA: ICD-10-CM

## 2022-06-06 DIAGNOSIS — E78.5 HYPERLIPIDEMIA ASSOCIATED WITH TYPE 2 DIABETES MELLITUS: ICD-10-CM

## 2022-06-06 DIAGNOSIS — L40.9 PSORIASIS: ICD-10-CM

## 2022-06-06 DIAGNOSIS — E11.69 HYPERLIPIDEMIA ASSOCIATED WITH TYPE 2 DIABETES MELLITUS: ICD-10-CM

## 2022-06-06 DIAGNOSIS — I15.2 HYPERTENSION ASSOCIATED WITH DIABETES: ICD-10-CM

## 2022-06-06 DIAGNOSIS — F17.200 SMOKING: ICD-10-CM

## 2022-06-06 DIAGNOSIS — E11.29 TYPE 2 DIABETES MELLITUS WITH MICROALBUMINURIA, WITH LONG-TERM CURRENT USE OF INSULIN: Primary | ICD-10-CM

## 2022-06-06 DIAGNOSIS — Z79.4 TYPE 2 DIABETES MELLITUS WITH MICROALBUMINURIA, WITH LONG-TERM CURRENT USE OF INSULIN: Primary | ICD-10-CM

## 2022-06-06 DIAGNOSIS — Z12.2 ENCOUNTER FOR SCREENING FOR LUNG CANCER: ICD-10-CM

## 2022-06-06 DIAGNOSIS — E78.1 TYPE 2 DIABETES MELLITUS WITH HYPERTRIGLYCERIDEMIA: ICD-10-CM

## 2022-06-06 DIAGNOSIS — E11.59 HYPERTENSION ASSOCIATED WITH DIABETES: ICD-10-CM

## 2022-06-06 DIAGNOSIS — R80.9 TYPE 2 DIABETES MELLITUS WITH MICROALBUMINURIA, WITH LONG-TERM CURRENT USE OF INSULIN: Primary | ICD-10-CM

## 2022-06-06 PROCEDURE — 4010F ACE/ARB THERAPY RXD/TAKEN: CPT | Mod: CPTII,S$GLB,, | Performed by: FAMILY MEDICINE

## 2022-06-06 PROCEDURE — 1159F PR MEDICATION LIST DOCUMENTED IN MEDICAL RECORD: ICD-10-PCS | Mod: CPTII,S$GLB,, | Performed by: FAMILY MEDICINE

## 2022-06-06 PROCEDURE — 1159F MED LIST DOCD IN RCRD: CPT | Mod: CPTII,S$GLB,, | Performed by: FAMILY MEDICINE

## 2022-06-06 PROCEDURE — 3078F PR MOST RECENT DIASTOLIC BLOOD PRESSURE < 80 MM HG: ICD-10-PCS | Mod: CPTII,S$GLB,, | Performed by: FAMILY MEDICINE

## 2022-06-06 PROCEDURE — 3066F NEPHROPATHY DOC TX: CPT | Mod: CPTII,S$GLB,, | Performed by: FAMILY MEDICINE

## 2022-06-06 PROCEDURE — 3062F PR POS MACROALBUMINURIA RESULT DOCUMENTED/REVIEW: ICD-10-PCS | Mod: CPTII,S$GLB,, | Performed by: FAMILY MEDICINE

## 2022-06-06 PROCEDURE — 3078F DIAST BP <80 MM HG: CPT | Mod: CPTII,S$GLB,, | Performed by: FAMILY MEDICINE

## 2022-06-06 PROCEDURE — 99214 PR OFFICE/OUTPT VISIT, EST, LEVL IV, 30-39 MIN: ICD-10-PCS | Mod: S$GLB,,, | Performed by: FAMILY MEDICINE

## 2022-06-06 PROCEDURE — 3046F HEMOGLOBIN A1C LEVEL >9.0%: CPT | Mod: CPTII,S$GLB,, | Performed by: FAMILY MEDICINE

## 2022-06-06 PROCEDURE — 3008F PR BODY MASS INDEX (BMI) DOCUMENTED: ICD-10-PCS | Mod: CPTII,S$GLB,, | Performed by: FAMILY MEDICINE

## 2022-06-06 PROCEDURE — 3062F POS MACROALBUMINURIA REV: CPT | Mod: CPTII,S$GLB,, | Performed by: FAMILY MEDICINE

## 2022-06-06 PROCEDURE — 4010F PR ACE/ARB THEARPY RXD/TAKEN: ICD-10-PCS | Mod: CPTII,S$GLB,, | Performed by: FAMILY MEDICINE

## 2022-06-06 PROCEDURE — 99999 PR PBB SHADOW E&M-EST. PATIENT-LVL V: CPT | Mod: PBBFAC,,, | Performed by: FAMILY MEDICINE

## 2022-06-06 PROCEDURE — 3074F PR MOST RECENT SYSTOLIC BLOOD PRESSURE < 130 MM HG: ICD-10-PCS | Mod: CPTII,S$GLB,, | Performed by: FAMILY MEDICINE

## 2022-06-06 PROCEDURE — 99999 PR PBB SHADOW E&M-EST. PATIENT-LVL V: ICD-10-PCS | Mod: PBBFAC,,, | Performed by: FAMILY MEDICINE

## 2022-06-06 PROCEDURE — 3074F SYST BP LT 130 MM HG: CPT | Mod: CPTII,S$GLB,, | Performed by: FAMILY MEDICINE

## 2022-06-06 PROCEDURE — 3008F BODY MASS INDEX DOCD: CPT | Mod: CPTII,S$GLB,, | Performed by: FAMILY MEDICINE

## 2022-06-06 PROCEDURE — 3046F PR MOST RECENT HEMOGLOBIN A1C LEVEL > 9.0%: ICD-10-PCS | Mod: CPTII,S$GLB,, | Performed by: FAMILY MEDICINE

## 2022-06-06 PROCEDURE — 99214 OFFICE O/P EST MOD 30 MIN: CPT | Mod: S$GLB,,, | Performed by: FAMILY MEDICINE

## 2022-06-06 PROCEDURE — 3066F PR DOCUMENTATION OF TREATMENT FOR NEPHROPATHY: ICD-10-PCS | Mod: CPTII,S$GLB,, | Performed by: FAMILY MEDICINE

## 2022-06-06 RX ORDER — DULAGLUTIDE 3 MG/.5ML
3 INJECTION, SOLUTION SUBCUTANEOUS
Qty: 4 PEN | Refills: 11 | Status: SHIPPED | OUTPATIENT
Start: 2022-06-06 | End: 2023-06-26 | Stop reason: ALTCHOICE

## 2022-06-06 RX ORDER — BETAMETHASONE DIPROPIONATE 0.5 MG/G
OINTMENT TOPICAL 2 TIMES DAILY PRN
Qty: 45 G | Refills: 1 | Status: SHIPPED | OUTPATIENT
Start: 2022-06-06 | End: 2023-06-14

## 2022-06-06 RX ORDER — BUSPIRONE HYDROCHLORIDE 5 MG/1
5 TABLET ORAL 2 TIMES DAILY
COMMUNITY
Start: 2022-05-26 | End: 2022-12-22

## 2022-06-06 RX ORDER — ICOSAPENT ETHYL 1000 MG/1
2 CAPSULE ORAL 2 TIMES DAILY WITH MEALS
Qty: 360 CAPSULE | Refills: 3 | Status: SHIPPED | OUTPATIENT
Start: 2022-06-06 | End: 2022-12-22 | Stop reason: ALTCHOICE

## 2022-06-07 ENCOUNTER — PATIENT MESSAGE (OUTPATIENT)
Dept: FAMILY MEDICINE | Facility: CLINIC | Age: 52
End: 2022-06-07
Payer: COMMERCIAL

## 2022-06-07 ENCOUNTER — TELEPHONE (OUTPATIENT)
Dept: FAMILY MEDICINE | Facility: CLINIC | Age: 52
End: 2022-06-07
Payer: COMMERCIAL

## 2022-06-07 NOTE — TELEPHONE ENCOUNTER
----- Message from Brittany Matt sent at 6/7/2022 10:00 AM CDT -----  Regarding: returned call  Contact: patient  Patient is returning a call, please call them back at  [phone]

## 2022-06-07 NOTE — TELEPHONE ENCOUNTER
----- Message from Brittany Matt sent at 6/7/2022  9:42 AM CDT -----  Regarding: meds  Contact: patient  Patient has a question about her meds,please call her back at [phone]

## 2022-06-07 NOTE — PROGRESS NOTES
Diabetes poorly controlled.  Partial compliance with insulin.  She misses medication 2 or 3 days per week.  Only uses insulin 2 times daily rather than 3 times.  States compliance with other medication.  Poor dietary compliance with a lot of sweets.  Blood pressure controlled.  Lipids with significant triglyceride elevation.  She is interested in lung cancer screening.  Discussed.  She does complain of some psoriasis on the elbows.  Did not bring in glucose readings.      Yarely was seen today for follow-up.    Diagnoses and all orders for this visit:    Type 2 diabetes mellitus with microalbuminuria, with long-term current use of insulin  -     Ambulatory referral/consult to Nephrology; Future  -     Ambulatory referral/consult to Diabetic Advanced Practice Providers (Medical Management); Future  -     Ambulatory referral/consult to Optometry; Future    Hyperlipidemia associated with type 2 diabetes mellitus  -     Lipid Panel; Future  -     Hemoglobin A1C; Future    Hypertension associated with diabetes    Type 2 diabetes mellitus with hypertriglyceridemia    Smoking  -     CT Chest Lung Screening Low Dose; Future    Psoriasis    Encounter for screening for lung cancer  -     CT Chest Lung Screening Low Dose; Future    Other orders  -     icosapent ethyL (VASCEPA) 1 gram Cap; Take 2 capsules (2 g total) by mouth 2 (two) times daily with meals.  -     dulaglutide (TRULICITY) 3 mg/0.5 mL pen injector; Inject 3 mg into the skin every 7 days.  -     betamethasone dipropionate (DIPROLENE) 0.05 % ointment; Apply topically 2 (two) times daily as needed (psoriasis).  -     Pneumococcal Conjugate Vaccine (20 Valent) (IM); Future  -     Pneumococcal Conjugate Vaccine (20 Valent) (IM)      Increase Trulicity.  Make sure she is taking the NovoLog 3 times a day.  Check glucose 3-4 times daily.  Bring in glucose readings next visit.  Follow-up 6 weeks.  Recheck hemoglobin A1c lipid 3 months.          Diabetes Management  Status    Statin: Taking  ACE/ARB: Taking    Screening or Prevention Patient's value Goal Complete/Controlled?   HgA1C Testing and Control   Lab Results   Component Value Date    HGBA1C 11.5 (H) 05/30/2022      Annually/Less than 8% No   Lipid profile : 05/30/2022 Annually Yes   LDL control Lab Results   Component Value Date    LDLCALC Invalid, Trig>400.0 05/30/2022    Annually/Less than 100 mg/dl  Yes   Nephropathy screening Lab Results   Component Value Date    LABMICR 1487.0 05/30/2022     No results found for: PROTEINUA Annually Yes   Blood pressure BP Readings from Last 1 Encounters:   06/06/22 122/78    Less than 140/90 Yes   Dilated retinal exam : 05/04/2018 Annually Yes   Foot exam   : 06/06/2022 Annually Yes       Past Medical History:  Past Medical History:   Diagnosis Date    Anxiety     Cholelithiasis     Depression     Fatty liver     HTN (hypertension)     Hyperlipidemia     Proteinuria     Dr. Sadler    Severe obesity (BMI 35.0-35.9 with comorbidity) 3/16/2018    ADEN (stress urinary incontinence, female)     Type 2 diabetes mellitus with hypertriglyceridemia 6/6/2022    Type 2 diabetes mellitus with microalbuminuria, with long-term current use of insulin 7/22/2016    Vitamin D insufficiency      No past surgical history on file.  Review of patient's allergies indicates:   Allergen Reactions    Pcn [penicillins] Rash     Current Outpatient Medications on File Prior to Visit   Medication Sig Dispense Refill    aripiprazole (ABILIFY) 5 MG Tab Take 1 tablet (5 mg total) by mouth once daily. 30 tablet 0    blood-glucose sensor (DEXCOM G6 SENSOR) Dana Tests glucose 4 times daily 3 each 11    busPIRone (BUSPAR) 5 MG Tab Take 5 mg by mouth 2 (two) times daily.      clonazePAM (KLONOPIN) 0.5 MG tablet Take 0.5 mg by mouth 2 (two) times daily as needed.  2    insulin aspart U-100 (NOVOLOG FLEXPEN U-100 INSULIN) 100 unit/mL (3 mL) InPn pen Inject 18 units under the skin three times daily  "(before meals) 15 mL 11    lisinopriL (PRINIVIL,ZESTRIL) 20 MG tablet TAKE ONE TABLET BY MOUTH ONCE DAILY 90 tablet 2    metFORMIN (GLUCOPHAGE-XR) 500 MG ER 24hr tablet TAKE 4 TABLETS BY MOUTH EVERY DAY WITH BREAKFAST 360 tablet 3    rosuvastatin (CRESTOR) 40 MG Tab TAKE ONE TABLET BY MOUTH ONCE DAILY 90 tablet 2    sertraline (ZOLOFT) 100 MG tablet Take 200 mg by mouth once daily.  2    [DISCONTINUED] dulaglutide (TRULICITY) 1.5 mg/0.5 mL pen injector Inject 1.5 mg into the skin every 7 days. 4 pen 11    blood-glucose meter (FREESTYLE SYSTEM KIT) kit Use as directed      blood-glucose meter,continuous (DEXCOM G6 ) Misc Tests glucose 4 times daily 1 each prn    blood-glucose transmitter (DEXCOM G6 TRANSMITTER) Dana Tests glucose 4 times daily 1 each 3    flash glucose scanning reader (LifeCareSimSTYLE VIRGILIO 14 DAY READER) Misc Test glucose 3 times daily 1 each prn    flash glucose sensor (FREESTYLE VIRGILIO 14 DAY SENSOR) Kit test glucose THREE TIMES DAILY 6 kit 3    SURE COMFORT PEN NEEDLE 31 gauge x 3/16" Ndle USE TO INJECT INSULINS FOUR TIMES DAILY 100 each 5    [DISCONTINUED] erythromycin (ROMYCIN) ophthalmic ointment Place into the right eye every 6 (six) hours. 3.5 g 0    [DISCONTINUED] naproxen sodium (ANAPROX) 220 MG tablet Take 220 mg by mouth as needed.       No current facility-administered medications on file prior to visit.     Social History     Socioeconomic History    Marital status:    Tobacco Use    Smoking status: Current Every Day Smoker     Packs/day: 1.50     Years: 28.00     Pack years: 42.00     Types: Cigarettes    Tobacco comment: current smike 7/day   Substance and Sexual Activity    Alcohol use: No     Family History   Problem Relation Age of Onset    Hypertension Mother     Diabetes Mother     Lymphoma Father     Hypertension Father     Diabetes Father     Heart disease Brother 57    Ovarian cancer Maternal Grandmother     Breast cancer Maternal Aunt     " "Kidney disease Neg Hx            ROS:  GENERAL: No fever, chills,  or significant weight changes.   CARDIOVASCULAR: Denies chest pain, PND, orthopnea or reduced exercise tolerance.  ABDOMEN: Appetite fine. Denies diarrhea, abdominal pain, hematemesis or blood in stool.  URINARY: No flank pain, dysuria or hematuria.      OBJECTIVE:   Vitals:    06/06/22 1547   BP: 122/78   Pulse: 84   Temp: 97.2 °F (36.2 °C)   TempSrc: Temporal   Weight: 78.9 kg (174 lb)   Height: 5' 5" (1.651 m)     Wt Readings from Last 3 Encounters:   06/06/22 78.9 kg (174 lb)   12/23/21 78 kg (172 lb)   09/30/21 78.2 kg (172 lb 6.4 oz)       APPEARANCE: Well nourished, well developed, in no acute distress.   HEAD: Normocephalic. Atraumatic. No sinus tenderness.   EYES:   Right eye: Pupil reactive. Conjunctiva clear.   Left eye: Pupil reactive. Conjunctiva clear.   Both fundi: Grossly normal to nondilated exam. EOMI.   EARS: TM's intact. Light reflex normal. No retraction or perforation.   NOSE: clear.   MOUTH & THROAT: No pharyngeal erythema or exudate. No lesions.   NECK: No bruits. No JVD. No cervical lymphadenopathy. No thyromegaly.   CHEST: Breath sounds clear bilaterally. Normal respiratory effort   CARDIOVASCULAR: Normal rate. Regular rhythm. No murmurs. No rub. No gallops.   ABDOMEN: Bowel sounds normal. Soft. No tenderness. No organomegaly.   PERIPHERAL VASCULAR: No cyanosis. No clubbing. No edema.   NEUROLOGIC: No focal findings.    Feet:Sensation in the feet intact to monofilament testing.   No significant foot lesions.  She does have some onychomycosis changes noted..Pulses palpable.  MENTAL STATUS: Alert. Oriented x 3.           " Bcc Infiltrative Histology Text: There were numerous aggregates of basaloid cells demonstrating an infiltrative pattern.

## 2022-06-07 NOTE — TELEPHONE ENCOUNTER
She said taht you increased her Trulicity yesterday to 3.0. She is asking if she can take 2 injections of 1.5 ? Please advise, thanks

## 2022-06-07 NOTE — TELEPHONE ENCOUNTER
I see that you sent in a new rx for 3mg . She is asking if she can finish what she has of the 1.5 and take 2 shots ?  Please advise, thanks

## 2022-06-10 NOTE — PROGRESS NOTES
Follow-up recent laboratory with some liver mild enzyme elevation.  Does not drink significant alcohol.  Medications reviewed.  She has been on Lipitor chronically.  Due for colonoscopy.    Yarely was seen today for follow-up.    Diagnoses and all orders for this visit:    Elevated liver enzymes  -     Hepatitis panel, acute; Future  -     US Abdomen Complete; Future  -     Hepatic function panel; Future    Hyperlipidemia associated with type 2 diabetes mellitus  -     Hemoglobin A1c; Future    Screening for colon cancer  -     Ambulatory referral to Gastroenterology    Other orders  -     rosuvastatin (CRESTOR) 40 MG Tab; Take 1 tablet (40 mg total) by mouth once daily.  -     Influenza - Quadrivalent (PF)      Discontinue atorvastatin.  Check abdominal ultrasound.  Check lipid, hemoglobin A1c, lipid, acute hepatitis panel December.  Encourage weight loss.  Likely fatty liver.            Past Medical History:  Past Medical History:   Diagnosis Date    Anxiety     Depression     HTN (hypertension)     Hyperlipidemia     Proteinuria     Dr. Sadler    Severe obesity (BMI 35.0-35.9 with comorbidity) 3/16/2018    ADEN (stress urinary incontinence, female)     Vitamin D insufficiency      No past surgical history on file.  Review of patient's allergies indicates:   Allergen Reactions    Pcn [penicillins] Rash     Current Outpatient Medications on File Prior to Visit   Medication Sig Dispense Refill    aripiprazole (ABILIFY) 5 MG Tab Take 1 tablet (5 mg total) by mouth once daily. 30 tablet 0    blood-glucose meter (FREESTYLE SYSTEM KIT) kit Use as directed      clonazePAM (KLONOPIN) 0.5 MG tablet Take 0.5 mg by mouth 2 (two) times daily as needed.  2    dextromethorphan polistirex (DELSYM 12 HOUR ORAL) Take by mouth continuous prn.      guaiFENesin (MUCINEX) 600 mg 12 hr tablet Take 1,200 mg by mouth as needed for Congestion.      lisinopril (PRINIVIL,ZESTRIL) 20 MG tablet TAKE ONE TABLET BY MOUTH ONCE  "DAILY 90 tablet 3    metFORMIN (GLUCOPHAGE-XR) 500 MG 24 hr tablet TAKE 4 TABLETS BY MOUTH EVERY DAY WITH BREAKFAST 360 tablet 1    naproxen sodium (ANAPROX) 220 MG tablet Take 220 mg by mouth as needed.      NOVOLOG FLEXPEN U-100 INSULIN 100 unit/mL InPn pen Inject 18 Units into the skin 3 (three) times daily before meals. 15 mL 12    sertraline (ZOLOFT) 100 MG tablet Take 200 mg by mouth once daily.  2    SURE COMFORT PEN NEEDLE 31 gauge x 3/16" Ndle USE TO INJECT INSULINS FOUR TIMES DAILY 100 each 5    TRULICITY 1.5 mg/0.5 mL PnIj inject 0.5 ml (1.5mg) into the skin every 7 days 2 mL 3    [DISCONTINUED] atorvastatin (LIPITOR) 40 MG tablet TAKE ONE TABLET BY MOUTH EVERY DAY 90 tablet 0     No current facility-administered medications on file prior to visit.      Social History     Socioeconomic History    Marital status:      Spouse name: Not on file    Number of children: Not on file    Years of education: Not on file    Highest education level: Not on file   Occupational History    Not on file   Social Needs    Financial resource strain: Not on file    Food insecurity:     Worry: Not on file     Inability: Not on file    Transportation needs:     Medical: Not on file     Non-medical: Not on file   Tobacco Use    Smoking status: Current Every Day Smoker     Packs/day: 1.50     Years: 28.00     Pack years: 42.00     Types: Cigarettes    Tobacco comment: current smike 7/day   Substance and Sexual Activity    Alcohol use: No    Drug use: Not on file    Sexual activity: Not on file   Lifestyle    Physical activity:     Days per week: Not on file     Minutes per session: Not on file    Stress: Not on file   Relationships    Social connections:     Talks on phone: Not on file     Gets together: Not on file     Attends Taoism service: Not on file     Active member of club or organization: Not on file     Attends meetings of clubs or organizations: Not on file     Relationship status: Not " "on file   Other Topics Concern    Not on file   Social History Narrative    Not on file     Family History   Problem Relation Age of Onset    Lymphoma Father     Hypertension Father     Diabetes Father     Hypertension Mother     Diabetes Mother     Breast cancer Maternal Aunt     Ovarian cancer Maternal Grandmother     Kidney disease Neg Hx            ROS:  GENERAL: No fever, chills,  or significant weight changes.   CARDIOVASCULAR: Denies chest pain, PND, orthopnea or reduced exercise tolerance.  ABDOMEN: Appetite fine. Denies diarrhea, abdominal pain, hematemesis or blood in stool.  URINARY: No flank pain, dysuria or hematuria.    Vitals:    09/20/19 1117   BP: 121/79   Pulse: 80   Weight: 87.8 kg (193 lb 9.6 oz)   Height: 5' 5.5" (1.664 m)   Answers for HPI/ROS submitted by the patient on 9/19/2019   activity change: No  unexpected weight change: No  neck pain: No  hearing loss: No  rhinorrhea: No  trouble swallowing: No  eye discharge: No  visual disturbance: No  chest tightness: No  wheezing: No  chest pain: No  palpitations: No  blood in stool: No  constipation: No  vomiting: No  diarrhea: Yes  polydipsia: No  polyuria: No  difficulty urinating: No  hematuria: No  menstrual problem: No  dysuria: No  joint swelling: No  arthralgias: No  headaches: No  weakness: No  confusion: No  dysphoric mood: No    Wt Readings from Last 3 Encounters:   09/20/19 87.8 kg (193 lb 9.6 oz)   09/12/19 87.5 kg (193 lb)   07/05/19 87.6 kg (193 lb 2 oz)       OBJECTIVE:   APPEARANCE: Well nourished, well developed, in no acute distress.    HEAD: Normocephalic.  Atraumatic.  No sinus tenderness.  EYES:   Right eye: Pupil reactive.  Conjunctiva clear.    Left eye: Pupil reactive.  Conjunctiva clear.  EOMI.    EARS: TM's intact. Light reflex normal. No retraction or perforation.    NOSE:  clear.  MOUTH & THROAT:  No pharyngeal erythema or exudate. No lesions.  NECK: Supple. No bruits.  No JVD.  No cervical lymphadenopathy.  " No thyromegaly.    CHEST: Breath sounds clear bilaterally.  Normal respiratory effort  CARDIOVASCULAR: Normal rate.  Regular rhythm.  No murmurs.  No rub.  No gallops.  ABDOMEN: Bowel sounds normal.  Soft.  No tenderness.  No organomegaly.  PERIPHERAL VASCULAR: No cyanosis.  No clubbing.  No edema.  NEUROLOGIC: No focal findings.  MENTAL STATUS: Alert.  Oriented x 3.         limited by BLE edema and weakness (+) LBP/dependent (less than 25% patients effort)

## 2022-06-15 ENCOUNTER — TELEPHONE (OUTPATIENT)
Dept: FAMILY MEDICINE | Facility: CLINIC | Age: 52
End: 2022-06-15
Payer: COMMERCIAL

## 2022-06-15 NOTE — TELEPHONE ENCOUNTER
----- Message from Kala Borrego sent at 6/15/2022  8:47 AM CDT -----  Yarely Thompson would like a call back at [phone], in regards to a question about the CT she has scheduled.

## 2022-06-24 ENCOUNTER — TELEPHONE (OUTPATIENT)
Dept: FAMILY MEDICINE | Facility: CLINIC | Age: 52
End: 2022-06-24
Payer: COMMERCIAL

## 2022-06-24 ENCOUNTER — HOSPITAL ENCOUNTER (OUTPATIENT)
Dept: RADIOLOGY | Facility: HOSPITAL | Age: 52
Discharge: HOME | End: 2022-06-24
Attending: FAMILY MEDICINE
Payer: COMMERCIAL

## 2022-06-24 DIAGNOSIS — Z12.2 ENCOUNTER FOR SCREENING FOR LUNG CANCER: ICD-10-CM

## 2022-06-24 DIAGNOSIS — F17.200 SMOKING: ICD-10-CM

## 2022-06-24 PROCEDURE — 71271 CT THORAX LUNG CANCER SCR C-: CPT | Mod: 26,,, | Performed by: RADIOLOGY

## 2022-06-24 PROCEDURE — 71271 CT CHEST LUNG SCREENING LOW DOSE: ICD-10-PCS | Mod: 26,,, | Performed by: RADIOLOGY

## 2022-06-24 PROCEDURE — 71271 CT THORAX LUNG CANCER SCR C-: CPT | Mod: TC,PO

## 2022-06-24 NOTE — TELEPHONE ENCOUNTER
----- Message from Sylvester Barnes sent at 6/24/2022 10:03 AM CDT -----  Contact: pt  Pt is calling rg her test results and would like to discuss/ received in the portal and can be reached at [phone]//thanks/dbw

## 2022-06-28 ENCOUNTER — TELEPHONE (OUTPATIENT)
Dept: FAMILY MEDICINE | Facility: CLINIC | Age: 52
End: 2022-06-28
Payer: COMMERCIAL

## 2022-06-28 DIAGNOSIS — F17.200 SMOKING: ICD-10-CM

## 2022-06-28 DIAGNOSIS — R16.2 HEPATOSPLENOMEGALY: ICD-10-CM

## 2022-06-28 DIAGNOSIS — R91.8 MULTIPLE PULMONARY NODULES: Primary | ICD-10-CM

## 2022-06-28 DIAGNOSIS — K76.0 FATTY LIVER: ICD-10-CM

## 2022-06-28 NOTE — TELEPHONE ENCOUNTER
Please see results review note.  See orders    CT scan shows multiple small pulmonary nodules.  This does require further follow-up.  Please refer to pulmonary for further evaluation.  In addition she has some enlargement of the liver and spleen.  Previously noted to have fatty liver.  Please refer her to hepatology for further follow-up on this. My nurse will contact you to arrange.  Thanks,  Dr. Ayon

## 2022-06-28 NOTE — TELEPHONE ENCOUNTER
----- Message from Murali Banuelos sent at 6/28/2022  9:46 AM CDT -----  Contact: [phone]  Yarely is requesting a callback as soon as possible regarding her test results.    Callback number: [phone]

## 2022-06-28 NOTE — TELEPHONE ENCOUNTER
Pt informed, she does not want to go to [place] so asking for referrals for [place]  , do we do a GI referral for hepatology issues?

## 2022-06-29 ENCOUNTER — PATIENT MESSAGE (OUTPATIENT)
Dept: PULMONOLOGY | Facility: CLINIC | Age: 52
End: 2022-06-29
Payer: COMMERCIAL

## 2022-07-06 ENCOUNTER — CLINICAL SUPPORT (OUTPATIENT)
Dept: SMOKING CESSATION | Facility: CLINIC | Age: 52
End: 2022-07-06
Payer: COMMERCIAL

## 2022-07-06 VITALS
OXYGEN SATURATION: 96 % | SYSTOLIC BLOOD PRESSURE: 121 MMHG | DIASTOLIC BLOOD PRESSURE: 76 MMHG | WEIGHT: 174 LBS | HEIGHT: 65 IN | BODY MASS INDEX: 28.99 KG/M2 | HEART RATE: 81 BPM

## 2022-07-06 DIAGNOSIS — F17.210 HEAVY CIGARETTE SMOKER (20-39 PER DAY): Primary | ICD-10-CM

## 2022-07-06 PROCEDURE — 99999 PR PBB SHADOW E&M-EST. PATIENT-LVL III: ICD-10-PCS | Mod: PBBFAC,,,

## 2022-07-06 PROCEDURE — 99404 PR PREVENT COUNSEL,INDIV,60 MIN: ICD-10-PCS | Mod: S$GLB,,,

## 2022-07-06 PROCEDURE — 99404 PREV MED CNSL INDIV APPRX 60: CPT | Mod: S$GLB,,,

## 2022-07-06 PROCEDURE — 99999 PR PBB SHADOW E&M-EST. PATIENT-LVL III: CPT | Mod: PBBFAC,,,

## 2022-07-06 RX ORDER — IBUPROFEN 200 MG
2 TABLET ORAL DAILY
Qty: 28 PATCH | Refills: 0 | Status: SHIPPED | OUTPATIENT
Start: 2022-07-06 | End: 2022-07-20 | Stop reason: SDUPTHER

## 2022-07-06 RX ORDER — VARENICLINE TARTRATE 1 MG/1
1 TABLET, FILM COATED ORAL 2 TIMES DAILY
Qty: 56 TABLET | Refills: 0 | Status: SHIPPED | OUTPATIENT
Start: 2022-07-06 | End: 2022-07-20 | Stop reason: SDUPTHER

## 2022-07-06 RX ORDER — NICOTINE POLACRILEX 2 MG/1
2 LOZENGE ORAL
Qty: 81 EACH | Refills: 0 | Status: SHIPPED | OUTPATIENT
Start: 2022-07-06 | End: 2022-07-20 | Stop reason: SDUPTHER

## 2022-07-06 NOTE — Clinical Note
Patient has enrolled in the smoking program. She is highly motivated to quit, yet has been trying on her own and is now seeking help as it can be hard. She has had a recent discovery of 9 noduals on her lungs. She was a 40 cigarette per day smoker for 36 years. She is currently down to 20-30 cigarettes per day. Per Smokerlyzer CO 24 ppm, last smoked 1 hour prior to meeting. She will be following up with group sessions and is excited to be getting help. Just got Dx of noduals on lung. CESD of 18 is preceived as a low lever mental distress or depression at this time. She will be monitored. Starting on Chantix, 21 mg patches and 2 mg gum.

## 2022-07-06 NOTE — PROGRESS NOTES
Addendum created to correct the extra charged dropped.   Patient has enrolled in the smoking program. She is highly motivated to quit, yet has been trying on her own and is now seeking help as it can be hard. She has had a recent discovery of 9 noduals on her lungs. She was a 40 cigarette per day smoker for 36 years. She is currently down to 20-30 cigarettes per day. Per Smokerlyzer CO 24 ppm, last smoked 1 hour prior to meeting. She will be following up with group sessions and is excited to be getting help. Just got Dx of noduals on lung. CESD of 18 is preceived as a low lever mental distress or depression at this time. She will be monitored. Starting on Chantix, 21 mg patches and 2 mg gum.

## 2022-07-12 ENCOUNTER — CLINICAL SUPPORT (OUTPATIENT)
Dept: SMOKING CESSATION | Facility: CLINIC | Age: 52
End: 2022-07-12
Payer: COMMERCIAL

## 2022-07-12 DIAGNOSIS — F17.210 MODERATE CIGARETTE SMOKER (10-19 PER DAY): Primary | ICD-10-CM

## 2022-07-12 PROCEDURE — 90853 GROUP PSYCHOTHERAPY: CPT | Mod: S$GLB,,,

## 2022-07-12 PROCEDURE — 99999 PR PBB SHADOW E&M-EST. PATIENT-LVL I: ICD-10-PCS | Mod: PBBFAC,,,

## 2022-07-12 PROCEDURE — 99999 PR PBB SHADOW E&M-EST. PATIENT-LVL I: CPT | Mod: PBBFAC,,,

## 2022-07-12 PROCEDURE — 90853 PR GROUP PSYCHOTHERAPY: ICD-10-PCS | Mod: S$GLB,,,

## 2022-07-12 NOTE — Clinical Note
Just a note to advise how the patient is progressing in the tobacco cessation program. Patient was an active participant in group discussions. Per Smokerlyzer CO 19 ppm, last smoked 20 minutes prior to meeting. She states she smokes 20 cigarettes per day down from 40.  Commended her for this reduction, she is also proud. The patient remains on the prescribed tobacco cessation medication regimen of 1 mg Chantix, 2 mg lozenges and 21 mg patches (doubled) without any negative side effects at this time. Session Focus: completion of TCRS (Tobacco Cessation Rating Scale) reviewed strategies, cues, and triggers. Introduced the negative impact of tobacco on health, the health advantages of discontinuing the use of tobacco, time line improved health changes after a quit, withdrawal issues to expect from nicotine and habit, and ways to achieve the goal of a quit. The patient denies any abnormal behavioral or mental changes at this time.

## 2022-07-12 NOTE — PROGRESS NOTES
Smoking Cessation Group Session #2    Site: [facility]  Date:  7/12/2022  Clinical Status of Patient: Outpatient   Length of Service and Code: 90 minutes - 56134   Number in Attendance: 5  Group Activities/Focus of Group:  Sharing last weeks challenges, triggers, and coping activities to remain quit and/ or keep making progress toward cessation, completion of TCRS (Tobacco Cessation Rating Scale) learned addiction model, personal reasons for quitting, medications, goals, quit date.    Specific session focus: completion of TCRS (Tobacco Cessation Rating Scale) reviewed strategies, cues, and triggers. Introduced the negative impact of tobacco on health, the health advantages of discontinuing the use of tobacco, time line improved health changes after a quit, withdrawal issues to expect from nicotine and habit, and ways to achieve the goal of a quit.    Target symptoms:  withdrawal and medication side effects             The following were rated moderate (3) to severe (4) on TCRS:       Moderate 3: none     Severe 4:   None    Patient's Response to Intervention: Patient was an active participant in group discussions. Per Smokerlyzer CO 19 ppm, last smoked 20 minutes prior to meeting. She states she smokes 20 cigarettes per day down from 40.  Commended her for this reduction, she is also proud. The patient remains on the prescribed tobacco cessation medication regimen of 1 mg Chantix, 2 mg lozenges and 21 mg patches (doubled) without any negative side effects at this time. Session Focus: completion of TCRS (Tobacco Cessation Rating Scale) reviewed strategies, cues, and triggers. Introduced the negative impact of tobacco on health, the health advantages of discontinuing the use of tobacco, time line improved health changes after a quit, withdrawal issues to expect from nicotine and habit, and ways to achieve the goal of a quit. The patient denies any abnormal behavioral or mental changes at this time. The patient will  continue with  therapy sessions and medication monitoring by CTTS. Prescribed medication management will be by physician.    Progress Toward Goals and Other Mental Status Changes: The patient denies any abnormal behavioral or mental changes at this time.     Diagnosis: Z72.0  Plan: The patient will continue with group therapy sessions and medication regimen prescribed with management by physician or by the Cessation Clinic Provider. Patient will inform Smoking Cessation Counselor of symptoms as rated high on TCRS.    Return to Clinic: 1 week    Quit Date: [email or web address]   Planned Quit Date: [email or web address]

## 2022-07-19 ENCOUNTER — CLINICAL SUPPORT (OUTPATIENT)
Dept: SMOKING CESSATION | Facility: CLINIC | Age: 52
End: 2022-07-19
Payer: COMMERCIAL

## 2022-07-19 DIAGNOSIS — F17.210 HEAVY CIGARETTE SMOKER (20-39 PER DAY): ICD-10-CM

## 2022-07-19 DIAGNOSIS — F17.200 NICOTINE DEPENDENCE: Primary | ICD-10-CM

## 2022-07-19 PROCEDURE — 99999 PR PBB SHADOW E&M-EST. PATIENT-LVL II: ICD-10-PCS | Mod: PBBFAC,,,

## 2022-07-19 PROCEDURE — 90853 GROUP PSYCHOTHERAPY: CPT | Mod: S$GLB,,,

## 2022-07-19 PROCEDURE — 90853 PR GROUP PSYCHOTHERAPY: ICD-10-PCS | Mod: S$GLB,,,

## 2022-07-19 PROCEDURE — 99999 PR PBB SHADOW E&M-EST. PATIENT-LVL II: CPT | Mod: PBBFAC,,,

## 2022-07-19 NOTE — Clinical Note
Patient was an active participant in group discussions. Per Smokerlyzer CO 13  ppm, last smoked 3  hours prior to meeting. She states she smokes 10 cigarettes per day and is doing great with her reductions. She feels great. The patient remains on the prescribed tobacco cessation medication regimen of 1 mg Chantix BID, 2 mg lozenges and 21 mg patches without any negative side effects at this time. Session Focus: completion of TCRS (Tobacco Cessation Rating Scale) reviewed strategies, controlling environment, cues, triggers, new goals set. Introduced high risk situations with preparation interventions, caffeine similarities with withdrawal issues of habit and nicotine, Alcohol, Understanding urges, cravings, stress and relaxation. Open discussion with intervention discussion. The patient denies any abnormal behavioral or mental changes at this time. The patient will continue with  therapy sessions and medication monitoring by CTTS. Prescribed medication management will be by physician.

## 2022-07-20 RX ORDER — NICOTINE POLACRILEX 2 MG/1
2 LOZENGE ORAL
Qty: 81 EACH | Refills: 0 | Status: SHIPPED | OUTPATIENT
Start: 2022-07-20 | End: 2022-08-09 | Stop reason: SDUPTHER

## 2022-07-20 RX ORDER — IBUPROFEN 200 MG
2 TABLET ORAL DAILY
Qty: 28 PATCH | Refills: 0 | Status: SHIPPED | OUTPATIENT
Start: 2022-07-20 | End: 2022-08-09 | Stop reason: SDUPTHER

## 2022-07-20 RX ORDER — VARENICLINE TARTRATE 1 MG/1
1 TABLET, FILM COATED ORAL 2 TIMES DAILY
Qty: 56 TABLET | Refills: 0 | Status: SHIPPED | OUTPATIENT
Start: 2022-07-20 | End: 2022-12-22

## 2022-07-20 NOTE — PROGRESS NOTES
Smoking Cessation Group Session #3    Site: [facility]  Date:  7/19/22  Clinical Status of Patient: Outpatient   Length of Service and Code: 90 minutes - 45867   Number in Attendance: 9  Group Activities/Focus of Group:  Sharing last weeks challenges, triggers, and coping activities to remain quit and/ or keep making progress toward cessation, completion of TCRS (Tobacco Cessation Rating Scale) learned addiction model, personal reasons for quitting, medications, goals, quit date.    Specific session focus: completion of TCRS (Tobacco Cessation Rating Scale) reviewed strategies, controlling environment, cues, triggers, new goals set. Introduced high risk situations with preparation interventions, caffeine similarities with withdrawal issues of habit and nicotine, Alcohol, Understanding urges, cravings, stress and relaxation. Open discussion with intervention discussion.    Target symptoms:  withdrawal and medication side effects             The following were rated moderate (3) to severe (4) on TCRS:       Moderate 3: none     Severe 4:   None    Patient's Response to Intervention: Patient was an active participant in group discussions. Per Smokerlyzer CO 13  ppm, last smoked 3  hours prior to meeting. She states she smokes 10 cigarettes per day and is doing great with her reductions. She feels great. The patient remains on the prescribed tobacco cessation medication regimen of 1 mg Chantix BID, 2 mg lozenges and 21 mg patches without any negative side effects at this time. Session Focus: completion of TCRS (Tobacco Cessation Rating Scale) reviewed strategies, controlling environment, cues, triggers, new goals set. Introduced high risk situations with preparation interventions, caffeine similarities with withdrawal issues of habit and nicotine, Alcohol, Understanding urges, cravings, stress and relaxation. Open discussion with intervention discussion. The patient denies any abnormal behavioral or mental changes at this  time. The patient will continue with  therapy sessions and medication monitoring by CTTS. Prescribed medication management will be by physician.    Progress Toward Goals and Other Mental Status Changes: The patient denies any abnormal behavioral or mental changes at this time.     Diagnosis: Z72.0  Plan: The patient will continue with group therapy sessions and medication regimen prescribed with management by physician or by the Cessation Clinic Provider. Patient will inform Smoking Cessation Counselor of symptoms as rated high on TCRS.    Return to Clinic: 1 week    Quit Date: court   Planned Quit Date: court

## 2022-07-22 ENCOUNTER — OFFICE VISIT (OUTPATIENT)
Dept: FAMILY MEDICINE | Facility: CLINIC | Age: 52
End: 2022-07-22
Payer: COMMERCIAL

## 2022-07-22 VITALS
SYSTOLIC BLOOD PRESSURE: 126 MMHG | BODY MASS INDEX: 30.16 KG/M2 | TEMPERATURE: 98 F | HEART RATE: 75 BPM | HEIGHT: 65 IN | WEIGHT: 181 LBS | DIASTOLIC BLOOD PRESSURE: 77 MMHG

## 2022-07-22 DIAGNOSIS — E11.29 TYPE 2 DIABETES MELLITUS WITH MICROALBUMINURIA, WITH LONG-TERM CURRENT USE OF INSULIN: Primary | ICD-10-CM

## 2022-07-22 DIAGNOSIS — Z79.4 TYPE 2 DIABETES MELLITUS WITH MICROALBUMINURIA, WITH LONG-TERM CURRENT USE OF INSULIN: Primary | ICD-10-CM

## 2022-07-22 DIAGNOSIS — R80.9 TYPE 2 DIABETES MELLITUS WITH MICROALBUMINURIA, WITH LONG-TERM CURRENT USE OF INSULIN: Primary | ICD-10-CM

## 2022-07-22 PROCEDURE — 4010F PR ACE/ARB THEARPY RXD/TAKEN: ICD-10-PCS | Mod: CPTII,S$GLB,, | Performed by: FAMILY MEDICINE

## 2022-07-22 PROCEDURE — 90471 PNEUMOCOCCAL CONJUGATE VACCINE 20-VALENT: ICD-10-PCS | Mod: S$GLB,,, | Performed by: FAMILY MEDICINE

## 2022-07-22 PROCEDURE — 99999 PR PBB SHADOW E&M-EST. PATIENT-LVL IV: CPT | Mod: PBBFAC,,, | Performed by: FAMILY MEDICINE

## 2022-07-22 PROCEDURE — 3062F POS MACROALBUMINURIA REV: CPT | Mod: CPTII,S$GLB,, | Performed by: FAMILY MEDICINE

## 2022-07-22 PROCEDURE — 3066F PR DOCUMENTATION OF TREATMENT FOR NEPHROPATHY: ICD-10-PCS | Mod: CPTII,S$GLB,, | Performed by: FAMILY MEDICINE

## 2022-07-22 PROCEDURE — 99999 PR PBB SHADOW E&M-EST. PATIENT-LVL IV: ICD-10-PCS | Mod: PBBFAC,,, | Performed by: FAMILY MEDICINE

## 2022-07-22 PROCEDURE — 99213 PR OFFICE/OUTPT VISIT, EST, LEVL III, 20-29 MIN: ICD-10-PCS | Mod: 25,S$GLB,, | Performed by: FAMILY MEDICINE

## 2022-07-22 PROCEDURE — 99213 OFFICE O/P EST LOW 20 MIN: CPT | Mod: 25,S$GLB,, | Performed by: FAMILY MEDICINE

## 2022-07-22 PROCEDURE — 3066F NEPHROPATHY DOC TX: CPT | Mod: CPTII,S$GLB,, | Performed by: FAMILY MEDICINE

## 2022-07-22 PROCEDURE — 3046F PR MOST RECENT HEMOGLOBIN A1C LEVEL > 9.0%: ICD-10-PCS | Mod: CPTII,S$GLB,, | Performed by: FAMILY MEDICINE

## 2022-07-22 PROCEDURE — 1159F PR MEDICATION LIST DOCUMENTED IN MEDICAL RECORD: ICD-10-PCS | Mod: CPTII,S$GLB,, | Performed by: FAMILY MEDICINE

## 2022-07-22 PROCEDURE — 90677 PNEUMOCOCCAL CONJUGATE VACCINE 20-VALENT: ICD-10-PCS | Mod: S$GLB,,, | Performed by: FAMILY MEDICINE

## 2022-07-22 PROCEDURE — 4010F ACE/ARB THERAPY RXD/TAKEN: CPT | Mod: CPTII,S$GLB,, | Performed by: FAMILY MEDICINE

## 2022-07-22 PROCEDURE — 3008F PR BODY MASS INDEX (BMI) DOCUMENTED: ICD-10-PCS | Mod: CPTII,S$GLB,, | Performed by: FAMILY MEDICINE

## 2022-07-22 PROCEDURE — 1159F MED LIST DOCD IN RCRD: CPT | Mod: CPTII,S$GLB,, | Performed by: FAMILY MEDICINE

## 2022-07-22 PROCEDURE — 90471 IMMUNIZATION ADMIN: CPT | Mod: S$GLB,,, | Performed by: FAMILY MEDICINE

## 2022-07-22 PROCEDURE — 3008F BODY MASS INDEX DOCD: CPT | Mod: CPTII,S$GLB,, | Performed by: FAMILY MEDICINE

## 2022-07-22 PROCEDURE — 90677 PCV20 VACCINE IM: CPT | Mod: S$GLB,,, | Performed by: FAMILY MEDICINE

## 2022-07-22 PROCEDURE — 3062F PR POS MACROALBUMINURIA RESULT DOCUMENTED/REVIEW: ICD-10-PCS | Mod: CPTII,S$GLB,, | Performed by: FAMILY MEDICINE

## 2022-07-22 PROCEDURE — 3046F HEMOGLOBIN A1C LEVEL >9.0%: CPT | Mod: CPTII,S$GLB,, | Performed by: FAMILY MEDICINE

## 2022-07-22 RX ORDER — INSULIN ASPART 100 [IU]/ML
20 INJECTION, SOLUTION INTRAVENOUS; SUBCUTANEOUS
Qty: 15 ML | Refills: 11 | Status: SHIPPED | OUTPATIENT
Start: 2022-07-22 | End: 2022-10-14 | Stop reason: SDUPTHER

## 2022-07-22 NOTE — PROGRESS NOTES
Follow-up Diabetes poorly controlled.  States better compliance with insulin.    Poor dietary compliance with a lot of sweets.  Blood pressure controlled.  Measurements from her glucose meter as below.  Not terrible 1st thing in the morning however goes up during the day.  No hypoglycemia.                    Yarely was seen today for follow-up and diabetes.    Diagnoses and all orders for this visit:    Type 2 diabetes mellitus with microalbuminuria, with long-term current use of insulin    Other orders  -     insulin aspart U-100 (NOVOLOG FLEXPEN U-100 INSULIN) 100 unit/mL (3 mL) InPn pen; Inject 20 Units into the skin 3 (three) times daily before meals. +scale.<200=+0u, 201-250=+2u,251-300=+4u, 301-350=+6u, >350=+8u  -     Pneumococcal Conjugate Vaccine (20 Valent) (IM); Future  -     Pneumococcal Conjugate Vaccine (20 Valent) (IM)    We will increase her prandial insulin as above.  Follow-up after hemoglobin A1c in September.  Follow-up as needed prior.  She has follow-up laboratory already schedule.  Needs better dietary compliance.      Diabetes Management Status    Statin: Taking  ACE/ARB: Taking    Screening or Prevention Patient's value Goal Complete/Controlled?   HgA1C Testing and Control   Lab Results   Component Value Date    HGBA1C 11.5 (H) 05/30/2022      Annually/Less than 8% No   Lipid profile : 05/30/2022 Annually Yes   LDL control Lab Results   Component Value Date    LDLCALC Invalid, Trig>400.0 05/30/2022    Annually/Less than 100 mg/dl  Yes   Nephropathy screening Lab Results   Component Value Date    LABMICR 1487.0 05/30/2022     No results found for: PROTEINUA  No results found for: UTPCR   Annually Yes   Blood pressure BP Readings from Last 1 Encounters:   07/22/22 126/77    Less than 140/90 Yes   Dilated retinal exam : 05/04/2018 Annually No   Foot exam   : 06/06/2022 Annually Yes       Past Medical History:  Past Medical History:   Diagnosis Date    Anxiety     Cholelithiasis      Depression     Fatty liver     HTN (hypertension)     Hyperlipidemia     Proteinuria     Dr. Sadler    Severe obesity (BMI 35.0-35.9 with comorbidity) 3/16/2018    ADEN (stress urinary incontinence, female)     Type 2 diabetes mellitus with hypertriglyceridemia 6/6/2022    Type 2 diabetes mellitus with microalbuminuria, with long-term current use of insulin 7/22/2016    Vitamin D insufficiency      No past surgical history on file.  Review of patient's allergies indicates:   Allergen Reactions    Pcn [penicillins] Rash     Current Outpatient Medications on File Prior to Visit   Medication Sig Dispense Refill    aripiprazole (ABILIFY) 5 MG Tab Take 1 tablet (5 mg total) by mouth once daily. 30 tablet 0    betamethasone dipropionate (DIPROLENE) 0.05 % ointment Apply topically 2 (two) times daily as needed (psoriasis). 45 g 1    busPIRone (BUSPAR) 5 MG Tab Take 5 mg by mouth 2 (two) times daily.      clonazePAM (KLONOPIN) 0.5 MG tablet Take 0.5 mg by mouth 2 (two) times daily as needed.  2    dulaglutide (TRULICITY) 3 mg/0.5 mL pen injector Inject 3 mg into the skin every 7 days. 4 pen 11    icosapent ethyL (VASCEPA) 1 gram Cap Take 2 capsules (2 g total) by mouth 2 (two) times daily with meals. 360 capsule 3    lisinopriL (PRINIVIL,ZESTRIL) 20 MG tablet TAKE ONE TABLET BY MOUTH ONCE DAILY 90 tablet 2    metFORMIN (GLUCOPHAGE-XR) 500 MG ER 24hr tablet TAKE 4 TABLETS BY MOUTH EVERY DAY WITH BREAKFAST 360 tablet 3    nicotine (NICODERM CQ) 21 mg/24 hr Place 2 patches onto the skin once daily. Dose appropriate 28 patch 0    nicotine, polacrilex, 2 mg lzmn Can take 1-2 per hour in place of a cigarette for breakthrough cravings in place of a cigarette. do not chew 81 each 0    rosuvastatin (CRESTOR) 40 MG Tab TAKE ONE TABLET BY MOUTH ONCE DAILY 90 tablet 2    sertraline (ZOLOFT) 100 MG tablet Take 200 mg by mouth once daily.  2    varenicline (CHANTIX) 1 mg Tab Take 1 tablet (1 mg total) by mouth 2  "(two) times daily. 56 tablet 0    [DISCONTINUED] insulin aspart U-100 (NOVOLOG FLEXPEN U-100 INSULIN) 100 unit/mL (3 mL) InPn pen Inject 18 units under the skin three times daily (before meals) 15 mL 11    blood-glucose meter kit Use as directed      blood-glucose meter,continuous (DEXCOM G6 ) Misc Tests glucose 4 times daily 1 each prn    blood-glucose sensor (DEXCOM G6 SENSOR) Dana Tests glucose 4 times daily 3 each 11    blood-glucose transmitter (DEXCOM G6 TRANSMITTER) Dana Tests glucose 4 times daily 1 each 3    flash glucose scanning reader (FREESTYLE VIRGILIO 14 DAY READER) Misc Test glucose 3 times daily 1 each prn    flash glucose sensor (FREESTYLE VIRGILIO 14 DAY SENSOR) Kit test glucose THREE TIMES DAILY 6 kit 3    SURE COMFORT PEN NEEDLE 31 gauge x 3/16" Ndle USE TO INJECT INSULINS FOUR TIMES DAILY 100 each 5     No current facility-administered medications on file prior to visit.     Social History     Socioeconomic History    Marital status:    Tobacco Use    Smoking status: Current Every Day Smoker     Packs/day: 1.50     Years: 28.00     Pack years: 42.00     Types: Cigarettes    Smokeless tobacco: Never Used    Tobacco comment: current smike 7/day   Substance and Sexual Activity    Alcohol use: No     Family History   Problem Relation Age of Onset    Hypertension Mother     Diabetes Mother     Lymphoma Father     Hypertension Father     Diabetes Father     Heart disease Brother 57    Ovarian cancer Maternal Grandmother     Breast cancer Maternal Aunt     Kidney disease Neg Hx            ROS:  GENERAL: No fever, chills,  or significant weight changes.   CARDIOVASCULAR: Denies chest pain, PND, orthopnea or reduced exercise tolerance.  ABDOMEN: Appetite fine. Denies diarrhea, abdominal pain, hematemesis or blood in stool.  URINARY: No flank pain, dysuria or hematuria.  Vitals:    07/22/22 0811   BP: 126/77   Pulse: 75   Temp: 97.5 °F (36.4 °C)   TempSrc: Temporal " "  Weight: 82.1 kg (181 lb)   Height: 5' 5" (1.651 m)       Wt Readings from Last 3 Encounters:   07/22/22 82.1 kg (181 lb)   07/06/22 78.9 kg (174 lb)   06/06/22 78.9 kg (174 lb)       APPEARANCE: Well nourished, well developed, in no acute distress.    HEAD: Normocephalic.  Atraumatic.  EYES:   Right eye: Pupil reactive.  Conjunctiva clear.    Left eye: Pupil reactive.  Conjunctiva clear.    NECK: Supple. No bruits.  No JVD.  No cervical lymphadenopathy.  No thyromegaly.    CHEST: Breath sounds clear bilaterally.  Normal respiratory effort  CARDIOVASCULAR: Normal rate.  Regular rhythm.  No murmurs.  No rub.  No gallops.   No edema.  MENTAL STATUS: Alert.  Oriented x 3.      "

## 2022-08-09 ENCOUNTER — CLINICAL SUPPORT (OUTPATIENT)
Dept: SMOKING CESSATION | Facility: CLINIC | Age: 52
End: 2022-08-09
Payer: COMMERCIAL

## 2022-08-09 DIAGNOSIS — F17.210 HEAVY CIGARETTE SMOKER (20-39 PER DAY): ICD-10-CM

## 2022-08-09 DIAGNOSIS — F17.200 NICOTINE DEPENDENCE: Primary | ICD-10-CM

## 2022-08-09 PROCEDURE — 99999 PR PBB SHADOW E&M-EST. PATIENT-LVL II: ICD-10-PCS | Mod: PBBFAC,,,

## 2022-08-09 PROCEDURE — 90853 PR GROUP PSYCHOTHERAPY: ICD-10-PCS | Mod: S$GLB,,,

## 2022-08-09 PROCEDURE — 99999 PR PBB SHADOW E&M-EST. PATIENT-LVL II: CPT | Mod: PBBFAC,,,

## 2022-08-09 PROCEDURE — 90853 GROUP PSYCHOTHERAPY: CPT | Mod: S$GLB,,,

## 2022-08-09 RX ORDER — IBUPROFEN 200 MG
2 TABLET ORAL DAILY
Qty: 28 PATCH | Refills: 0 | Status: SHIPPED | OUTPATIENT
Start: 2022-08-09 | End: 2022-12-22

## 2022-08-09 RX ORDER — NICOTINE POLACRILEX 2 MG/1
2 LOZENGE ORAL
Qty: 81 EACH | Refills: 0 | Status: SHIPPED | OUTPATIENT
Start: 2022-08-09 | End: 2022-08-23 | Stop reason: SDUPTHER

## 2022-08-09 NOTE — Clinical Note
"Patient remains was able to get quit as of 8/6/2022!!! Per Smokerlyzer CO1 ppm, (<6 is a non-smoker). The patient remains on the prescribed tobacco cessation medication regimen of 21 mg patches and 2 mg lozenges without any negative side effects at this time. She was taking 1 mg Chantix BID, states that she had a couple other medications that her Psyc.  Increased the strength and those along with the Chantix felt like "too much" she felt something similar to restless legg, but in her arms for 3 hours, after take her Chantix. She has since discontinued.  Session Focus: completion of TCRS reviewed strategies, habitual behavior, high risks situations, understanding urges and cravings, stress and relaxation with open discussion and additional interventions, Introduced lapses, relapses, understanding them and analyzing the situation of a lapse, conflict issues that may be linked to a lapse. The patient denies any abnormal behavioral or mental changes at this time. "

## 2022-08-10 NOTE — PROGRESS NOTES
"Smoking Cessation Group Session #5    Site: [facility]  Date:  8/9/2022  Clinical Status of Patient: Outpatient   Length of Service and Code: 90 minutes - 17446   Number in Attendance: 4  Group Activities/Focus of Group:  Sharing last weeks challenges, triggers, and coping activities to remain quit and/ or keep making progress toward cessation, completion of TCRS (Tobacco Cessation Rating Scale) learned addiction model, personal reasons for quitting, medications, goals, quit date.    Specific session focus: completion of TCRS (Tobacco Cessation Rating Scale) reviewed strategies, habitual behavior, high risks situations, understanding urges and cravings, stress and relaxation with open discussion and additional interventions, Introduced lapses, relapses, understanding them and analyzing the situation of a lapse, conflict issues that may be linked to a lapse.     Target symptoms:  withdrawal and medication side effects             The following were rated moderate (3) to severe (4) on TCRS:       Moderate 3: none     Severe 4:   None    Patient's Response to Intervention: Patient was an active participant in group discussions. Patient remains quit as of 8/6/2022. Commended patient for this quit. She was very proud. She states she's quit before for 3 years. Per Smokerlyzer CO1 ppm, (<6 is a non-smoker). The patient remains on the prescribed tobacco cessation medication regimen of 21 mg patches and 2 mg lozenges without any negative side effects at this time. She was taking 1 mg Chantix BID, states that she had a couple other medications that her Psyc.  Increased the strength and those along with the Chantix felt like "too much" she felt something similar to restless legg, but in her arms for 3 hours, after take her Chantix. She has since discontinued.  Session Focus: completion of TCRS (Tobacco Cessation Rating Scale) reviewed strategies, habitual behavior, high risks situations, understanding urges and cravings, stress " and relaxation with open discussion and additional interventions, Introduced lapses, relapses, understanding them and analyzing the situation of a lapse, conflict issues that may be linked to a lapse. The patient denies any abnormal behavioral or mental changes at this time. The patient will continue with  therapy sessions and medication monitoring by CTTS. Prescribed medication management will be by physician.    Progress Toward Goals and Other Mental Status Changes: The patient denies any abnormal behavioral or mental changes at this time.     Diagnosis: Z72.0  Plan: The patient will continue with group therapy sessions and medication regimen prescribed with management by physician or by the Cessation Clinic Provider. Patient will inform Smoking Cessation Counselor of symptoms as rated high on TCRS.    Return to Clinic: 1 week    Quit Date: 8/6/22   Planned Quit Date: 8/6/22

## 2022-08-23 ENCOUNTER — CLINICAL SUPPORT (OUTPATIENT)
Dept: SMOKING CESSATION | Facility: CLINIC | Age: 52
End: 2022-08-23
Payer: COMMERCIAL

## 2022-08-23 DIAGNOSIS — F17.210 HEAVY CIGARETTE SMOKER (20-39 PER DAY): ICD-10-CM

## 2022-08-23 DIAGNOSIS — F17.200 NICOTINE DEPENDENCE: Primary | ICD-10-CM

## 2022-08-23 PROCEDURE — 99999 PR PBB SHADOW E&M-EST. PATIENT-LVL I: ICD-10-PCS | Mod: PBBFAC,,,

## 2022-08-23 PROCEDURE — 90853 PR GROUP PSYCHOTHERAPY: ICD-10-PCS | Mod: S$GLB,,,

## 2022-08-23 PROCEDURE — 90853 GROUP PSYCHOTHERAPY: CPT | Mod: S$GLB,,,

## 2022-08-23 PROCEDURE — 99999 PR PBB SHADOW E&M-EST. PATIENT-LVL I: CPT | Mod: PBBFAC,,,

## 2022-08-23 RX ORDER — NICOTINE POLACRILEX 2 MG/1
2 LOZENGE ORAL
Qty: 81 EACH | Refills: 0 | Status: SHIPPED | OUTPATIENT
Start: 2022-08-23 | End: 2022-12-22

## 2022-08-23 NOTE — PROGRESS NOTES
Smoking Cessation Group Session #5    Site: [facility]  Date:  8/23/2022  Clinical Status of Patient: Outpatient   Length of Service and Code: 90 minutes - 25065   Number in Attendance: 4  Group Activities/Focus of Group:  Sharing last weeks challenges, triggers, and coping activities to remain quit and/ or keep making progress toward cessation, completion of TCRS (Tobacco Cessation Rating Scale) learned addiction model, personal reasons for quitting, medications, goals, quit date.    Specific session focus: completion of TCRS (Tobacco Cessation Rating Scale) reviewed strategies, habitual behavior, high risks situations, understanding urges and cravings, stress and relaxation with open discussion and additional interventions, Introduced lapses, relapses, understanding them and analyzing the situation of a lapse, conflict issues that may be linked to a lapse.     Target symptoms:  withdrawal and medication side effects             The following were rated moderate (3) to severe (4) on TCRS:       Moderate 3: none     Severe 4:   None    Patient's Response to Intervention: Patient was an active participant in group discussions. Patient remains quit as of 8/6/22. Commended patient for this continued quit. Per Smokerlyzer CO 0 ppm. The patient remains on the prescribed tobacco cessation medication regimen of 14 mg patches and 2 mg lozenges without any negative side effects at this time. She states she forgets the patches most days. Discussed weaning off the patches. Session Focus: completion of TCRS (Tobacco Cessation Rating Scale) reviewed strategies, habitual behavior, high risks situations, understanding urges and cravings, stress and relaxation with open discussion and additional interventions, Introduced lapses, relapses, understanding them and analyzing the situation of a lapse, conflict issues that may be linked to a lapse. The patient denies any abnormal behavioral or mental changes at this time. The patient will  continue with  therapy sessions and medication monitoring by CTTS. Prescribed medication management will be by physician.     Progress Toward Goals and Other Mental Status Changes: The patient denies any abnormal behavioral or mental changes at this time.     Diagnosis: Z72.0  Plan: The patient will continue with group therapy sessions and medication regimen prescribed with management by physician or by the Cessation Clinic Provider. Patient will inform Smoking Cessation Counselor of symptoms as rated high on TCRS.    Return to Clinic: 1 week    Quit Date: 8/6/22   Planned Quit Date: 8/6/22

## 2022-08-23 NOTE — Clinical Note
Just a note to advise how the patient is progressing in the tobacco cessation program. Patient was an active participant in group discussions. Patient remains quit as of 8/6/22. Commended patient for this continued quit. Per Smokerlyzer CO 0 ppm. The patient remains on the prescribed tobacco cessation medication regimen of 14 mg patches and 2 mg lozenges without any negative side effects at this time. She states she forgets the patches most days. Discussed weaning off the patches. Session Focus: completion of TCRS (Tobacco Cessation Rating Scale) reviewed strategies, habitual behavior, high risks situations, understanding urges and cravings, stress and relaxation with open discussion and additional interventions, Introduced lapses, relapses, understanding them and analyzing the situation of a lapse, conflict issues that may be linked to a lapse. The patient denies any abnormal behavioral or mental changes at this time. The patient will continue with  therapy sessions and medication monitoring by CTTS.

## 2022-08-30 ENCOUNTER — CLINICAL SUPPORT (OUTPATIENT)
Dept: SMOKING CESSATION | Facility: CLINIC | Age: 52
End: 2022-08-30
Payer: COMMERCIAL

## 2022-08-30 DIAGNOSIS — F17.200 NICOTINE DEPENDENCE: Primary | ICD-10-CM

## 2022-08-30 PROCEDURE — 90853 PR GROUP PSYCHOTHERAPY: ICD-10-PCS | Mod: S$GLB,,,

## 2022-08-30 PROCEDURE — 99999 PR PBB SHADOW E&M-EST. PATIENT-LVL II: ICD-10-PCS | Mod: PBBFAC,,,

## 2022-08-30 PROCEDURE — 99999 PR PBB SHADOW E&M-EST. PATIENT-LVL II: CPT | Mod: PBBFAC,,,

## 2022-08-30 PROCEDURE — 90853 GROUP PSYCHOTHERAPY: CPT | Mod: S$GLB,,,

## 2022-08-30 NOTE — Clinical Note
Patient was an active participant in group discussions. Patient remains quit as of 8/6/22. Commended patient for this continued quit. The patient remains on the prescribed tobacco cessation medication regimen of 21 mg patches and 2 mg lozenges without any negative side effects at this time. Session Focus: completion of TCRS (Tobacco Cessation Rating Scale) reviewed strategies, cues, and triggers. Introduced the negative impact of tobacco on health, the health advantages of discontinuing the use of tobacco, time line improved health changes after a quit, withdrawal issues to expect from nicotine and habit, and ways to achieve the goal of a quit. The patient denies any abnormal behavioral or mental changes at this time. The patient will continue with  therapy sessions and medication monitoring by CTTS. Prescribed medication management will be by physician.

## 2022-08-31 NOTE — PROGRESS NOTES
Smoking Cessation Group Session #2    Site: [facility]  Date:  8/30/2022  Clinical Status of Patient: Outpatient   Length of Service and Code: 90 minutes - 98450   Number in Attendance: 5  Group Activities/Focus of Group:  Sharing last weeks challenges, triggers, and coping activities to remain quit and/ or keep making progress toward cessation, completion of TCRS (Tobacco Cessation Rating Scale) learned addiction model, personal reasons for quitting, medications, goals, quit date.    Specific session focus: completion of TCRS (Tobacco Cessation Rating Scale) reviewed strategies, cues, and triggers. Introduced the negative impact of tobacco on health, the health advantages of discontinuing the use of tobacco, time line improved health changes after a quit, withdrawal issues to expect from nicotine and habit, and ways to achieve the goal of a quit.    Target symptoms:  withdrawal and medication side effects             The following were rated moderate (3) to severe (4) on TCRS:       Moderate 3: none     Severe 4:   none    Patient's Response to Intervention: Patient was an active participant in group discussions. Patient remains quit as of 8/6/22. Commended patient for this continued quit. The patient remains on the prescribed tobacco cessation medication regimen of 21 mg patches and 2 mg lozenges without any negative side effects at this time. Session Focus: completion of TCRS (Tobacco Cessation Rating Scale) reviewed strategies, cues, and triggers. Introduced the negative impact of tobacco on health, the health advantages of discontinuing the use of tobacco, time line improved health changes after a quit, withdrawal issues to expect from nicotine and habit, and ways to achieve the goal of a quit. The patient denies any abnormal behavioral or mental changes at this time. The patient will continue with  therapy sessions and medication monitoring by CTTS. Prescribed medication management will be by physician.    Progress  Toward Goals and Other Mental Status Changes: The patient denies any abnormal behavioral or mental changes at this time.     Diagnosis: Z72.0  Plan: The patient will continue with group therapy sessions and medication regimen prescribed with management by physician or by the Cessation Clinic Provider. Patient will inform Smoking Cessation Counselor of symptoms as rated high on TCRS.    Return to Clinic: 1 week    Quit Date: tbreena   Planned Quit Date: [email or web address]

## 2022-09-23 ENCOUNTER — TELEPHONE (OUTPATIENT)
Dept: FAMILY MEDICINE | Facility: CLINIC | Age: 52
End: 2022-09-23
Payer: COMMERCIAL

## 2022-09-23 NOTE — TELEPHONE ENCOUNTER
Informed pt she has refills available, per chart, she reports pharmacy has not been able to get the medication, informed patient to call other pharmacies to see if available and let us know where she wants it sent.

## 2022-09-23 NOTE — TELEPHONE ENCOUNTER
----- Message from Deepti Belen sent at 9/23/2022 10:59 AM CDT -----  Contact: Patient  Patient called to consult with nurse or staff regarding her medication Trulicity. She states the pharmacy has not been able to fill the prescription and wanted to reach out to the office. She hasn't had the prescription in about 2 months and her blood sugar keeps going up. She would like a call back and can be reached at [phone]. Thanks/MR

## 2022-09-27 ENCOUNTER — CLINICAL SUPPORT (OUTPATIENT)
Dept: SMOKING CESSATION | Facility: CLINIC | Age: 52
End: 2022-09-27
Payer: COMMERCIAL

## 2022-09-27 DIAGNOSIS — F17.200 NICOTINE DEPENDENCE: Primary | ICD-10-CM

## 2022-09-27 PROCEDURE — 99999 PR PBB SHADOW E&M-EST. PATIENT-LVL II: ICD-10-PCS | Mod: PBBFAC,,,

## 2022-09-27 PROCEDURE — 99402 PR PREVENT COUNSEL,INDIV,30 MIN: ICD-10-PCS | Mod: S$GLB,,,

## 2022-09-27 PROCEDURE — 99402 PREV MED CNSL INDIV APPRX 30: CPT | Mod: S$GLB,,,

## 2022-09-27 PROCEDURE — 99999 PR PBB SHADOW E&M-EST. PATIENT-LVL II: CPT | Mod: PBBFAC,,,

## 2022-09-27 NOTE — Clinical Note
Just a note to advise how the patient is progressing in the tobacco cessation program. Spoke with patient at length for smoking cessation follow up. Patient remains quit as of 8/6/22. Commended patient for this continued quit. She is not using any cessation medications and feels great. Session Focus: completion of TCRS (Tobacco Cessation Rating Scale) reviewed strategies, habitual behavior, high risks situations, understanding urges and cravings, stress and relaxation with open discussion and additional interventions, Introduced lapses, relapses, understanding them and analyzing the situation of a lapse, conflict issues that may be linked to a lapse. The patient denies any abnormal behavioral or mental changes at this time. The patient will continue with  therapy sessions and medication monitoring by CTTS. Prescribed medication management will be by physician. Patient will follow up either in group or individual appointment.

## 2022-09-27 NOTE — PROGRESS NOTES
Individual Follow-Up Form    9/27/2022    Quit Date: 8/6/22    Clinical Status of Patient: Outpatient    Length of Service: 30 minutes    Continuing Medication: no    Other Medications: none     Target Symptoms: Withdrawal and medication side effects. The following were  rated moderate (3) to severe (4) on TCRS:  Moderate (3): none  Severe (4): none    Comments: Spoke with patient at length for smoking cessation follow up. Patient remains quit as of 8/6/22. Commended patient for this continued quit. She is not using any cessation medications and feels great. Session Focus: completion of TCRS (Tobacco Cessation Rating Scale) reviewed strategies, habitual behavior, high risks situations, understanding urges and cravings, stress and relaxation with open discussion and additional interventions, Introduced lapses, relapses, understanding them and analyzing the situation of a lapse, conflict issues that may be linked to a lapse. The patient denies any abnormal behavioral or mental changes at this time. The patient will continue with  therapy sessions and medication monitoring by CTTS. Prescribed medication management will be by physician. Patient will follow up either in group or individual appointment.     Diagnosis: F17.210    Next Visit: 2 weeks

## 2022-09-29 ENCOUNTER — PATIENT OUTREACH (OUTPATIENT)
Dept: ADMINISTRATIVE | Facility: HOSPITAL | Age: 52
End: 2022-09-29
Payer: COMMERCIAL

## 2022-09-29 NOTE — PROGRESS NOTES
[facility] eye report: Per chart review, patient has an appointment on 11/10/22 for annual eye exam.

## 2022-10-12 ENCOUNTER — TELEPHONE (OUTPATIENT)
Dept: SMOKING CESSATION | Facility: CLINIC | Age: 52
End: 2022-10-12
Payer: COMMERCIAL

## 2022-10-12 NOTE — TELEPHONE ENCOUNTER
Attempted to call patient to reschedule their smoking cessation appointment. Left a message with my contact information to reschedule the appointment. Cheryl Alvarez [phone].

## 2022-10-13 ENCOUNTER — PATIENT MESSAGE (OUTPATIENT)
Dept: FAMILY MEDICINE | Facility: CLINIC | Age: 52
End: 2022-10-13
Payer: COMMERCIAL

## 2022-10-13 RX ORDER — INSULIN ASPART 100 [IU]/ML
20 INJECTION, SOLUTION INTRAVENOUS; SUBCUTANEOUS
Qty: 15 ML | Refills: 11 | Status: CANCELLED | OUTPATIENT
Start: 2022-10-13

## 2022-10-13 NOTE — TELEPHONE ENCOUNTER
----- Message from Elva Stevenskarina sent at 10/13/2022  8:17 AM CDT -----  Contact: [phone] @ Patient  Requesting an RX refill or new RX.  Is this a refill or new RX:   RX name and strength insulin aspart U-100 (NOVOLOG FLEXPEN U-100 INSULIN) 100 unit/mL (3 mL) InPn pen   Is this a 30 day or 90 day RX:   Pharmacy name and phone # [facility] - [place], [place] - [address]  The doctors have asked that we provide their patients with the following 2 reminders -- prescription refills can take up to 72 hours, and a friendly reminder that in the future you can use your [facility] account to request refills: na

## 2022-10-13 NOTE — TELEPHONE ENCOUNTER
Lm on vm returning her call , I did send Chhaya Fairte the refill req via portal for approval . I let her know that she is in her box doing messages now and it will be sent into her pharmacy this afternoon . Call the office w/ any questions or concerns.

## 2022-10-13 NOTE — TELEPHONE ENCOUNTER
No new care gaps identified.  [facility] Embedded Care Gaps. Reference number: [id number]. 10/13/2022   9:01:13 AM NIKI

## 2022-10-13 NOTE — TELEPHONE ENCOUNTER
----- Message from Laura Berry sent at 10/13/2022 12:45 PM CDT -----  Contact: Yarely  Type:  RX Refill Request    Who Called: Yarely  Refill or New Rx:Refill  RX Name and Strength:insulin aspart U-100 (NOVOLOG FLEXPEN U-100 INSULIN) 100 unit/mL (3 mL) InPn pen  How is the patient currently taking it? (ex. 1XDay):3 times a day  Is this a 30 day or 90 day RX:90  Preferred Pharmacy with phone number:  [facility] [place] - [address]  [address]  [facility] [address]  Phone: [phone] Fax: [phone]  Local or Mail Order:[facility]  Ordering Provider:Dr. Ayon  Would the patient rather a call back or a response via [facility]? Call back   Best Call Back Number:[phone]  Additional Information: Patient states as of today she is completely out of the medication, and its really dangerous to be without the insulin because the sugar goes up really fast    Thanks  LJ

## 2022-10-14 RX ORDER — INSULIN ASPART 100 [IU]/ML
20 INJECTION, SOLUTION INTRAVENOUS; SUBCUTANEOUS
Qty: 15 ML | Refills: 1 | Status: SHIPPED | OUTPATIENT
Start: 2022-10-14 | End: 2022-11-28 | Stop reason: SDUPTHER

## 2022-10-14 NOTE — TELEPHONE ENCOUNTER
Explained to patient how SS insulin works as she has been taking 30Units 3 times daily.  She is out of med and needs to have it sent to [facility] as [facility] does not have it.  Reports sugars still running 500s, 600s, sometimes 300s but she is still eating poorly.  Did not do lab in Sept as recommended, advised to do lab and follow up with Dr Ayon, declined to schedule as she has multiple appts next week with pulmonology.  Informed Dr Ayon would be made aware for advise on Monday, needs medication sent to pharmacy today.

## 2022-10-14 NOTE — TELEPHONE ENCOUNTER
No new care gaps identified.  [facility] Embedded Care Gaps. Reference number: [id number]. 10/14/2022   9:18:56 AM MARICHUYT

## 2022-10-25 ENCOUNTER — TELEPHONE (OUTPATIENT)
Dept: SMOKING CESSATION | Facility: CLINIC | Age: 52
End: 2022-10-25
Payer: COMMERCIAL

## 2022-10-25 NOTE — TELEPHONE ENCOUNTER
Attempted to call patient to reschedule their smoking cessation appointment. Left a message with my contact information to reschedule the appointment. Cheryl Alvarez [phone].

## 2022-10-31 ENCOUNTER — PATIENT OUTREACH (OUTPATIENT)
Dept: ADMINISTRATIVE | Facility: HOSPITAL | Age: 52
End: 2022-10-31
Payer: COMMERCIAL

## 2022-10-31 NOTE — PROGRESS NOTES
UPCOMING LAB/A1C REPORT: PT RESCHEDULED TO 12/9/22 - A1C ALREADY [place] ALONG WITH ALL OTHER DM LABS NEEDED

## 2022-11-28 ENCOUNTER — PATIENT MESSAGE (OUTPATIENT)
Dept: FAMILY MEDICINE | Facility: CLINIC | Age: 52
End: 2022-11-28
Payer: COMMERCIAL

## 2022-11-28 RX ORDER — INSULIN ASPART 100 [IU]/ML
20 INJECTION, SOLUTION INTRAVENOUS; SUBCUTANEOUS
Qty: 15 ML | Refills: 0 | Status: SHIPPED | OUTPATIENT
Start: 2022-11-28 | End: 2022-12-22 | Stop reason: SDUPTHER

## 2022-11-28 NOTE — TELEPHONE ENCOUNTER
----- Message from Brittany Matt sent at 8/16/2019  7:29 AM CDT -----  Contact: Patient  Type:  Mammogram    Caller is requesting to schedule their annual mammogram appointment.  Order is not listed in EPIC.  Please enter order and contact patient to schedule.  Name of Caller: Yarely  Where would they like the mammogram performed?[facility]  Would the patient rather a call back or a response via [facility]? call  Best Call Back Number:[phone]  Additional Information:   
25

## 2022-11-28 NOTE — TELEPHONE ENCOUNTER
----- Message from Tejas John sent at 11/28/2022 11:24 AM CST -----  Contact: [phone]  Type:  RX Refill Request    Who Called: Yarely   Refill or New Rx:refill  RX Name and Strength:insulin aspart U-100 (NOVOLOG FLEXPEN U-100 INSULIN) 100 unit/mL (3 mL) InPn pen  How is the patient currently taking it? (ex. 1XDay):Inject 20 Units into the skin 3 (three) times daily before meals. +scale.<200=+0u, 201-250=+2u,251-300=+4u, 301-350=+6u, >350=+8u - Subcutaneous  Is this a 30 day or 90 day RX:  Preferred Pharmacy with phone number:  [facility] [place] - [address] [facility] [address] [facility]  [address] [facility]  [place] [address]  Phone: [phone] Fax: [phone]  Local or Mail Order:local   Ordering Provider:Dr brenner   Would the patient rather a call back or a response via [facility]? Call back   Best Call Back Number:[phone]  Additional Information: n/a          Thanks KB

## 2022-11-28 NOTE — TELEPHONE ENCOUNTER
No new care gaps identified.  [facility] Embedded Care Gaps. Reference number: [id number]. 11/28/2022   11:51:43 AM CST

## 2022-12-06 ENCOUNTER — PATIENT OUTREACH (OUTPATIENT)
Dept: ADMINISTRATIVE | Facility: HOSPITAL | Age: 52
End: 2022-12-06
Payer: COMMERCIAL

## 2022-12-09 ENCOUNTER — LAB VISIT (OUTPATIENT)
Dept: LAB | Facility: HOSPITAL | Age: 52
End: 2022-12-09
Attending: FAMILY MEDICINE
Payer: COMMERCIAL

## 2022-12-09 DIAGNOSIS — E78.5 HYPERLIPIDEMIA ASSOCIATED WITH TYPE 2 DIABETES MELLITUS: ICD-10-CM

## 2022-12-09 DIAGNOSIS — E11.69 HYPERLIPIDEMIA ASSOCIATED WITH TYPE 2 DIABETES MELLITUS: ICD-10-CM

## 2022-12-09 LAB
CHOLEST SERPL-MCNC: 145 MG/DL (ref 120–199)
CHOLEST/HDLC SERPL: 5.4 {RATIO} (ref 2–5)
ESTIMATED AVG GLUCOSE: 309 MG/DL (ref 68–131)
HBA1C MFR BLD: 12.4 % (ref 4–5.6)
HDLC SERPL-MCNC: 27 MG/DL (ref 40–75)
HDLC SERPL: 18.6 % (ref 20–50)
LDLC SERPL CALC-MCNC: ABNORMAL MG/DL (ref 63–159)
NONHDLC SERPL-MCNC: 118 MG/DL
TRIGL SERPL-MCNC: 917 MG/DL (ref 30–150)

## 2022-12-09 PROCEDURE — 80061 LIPID PANEL: CPT | Performed by: FAMILY MEDICINE

## 2022-12-09 PROCEDURE — 36415 COLL VENOUS BLD VENIPUNCTURE: CPT | Mod: PO | Performed by: FAMILY MEDICINE

## 2022-12-09 PROCEDURE — 83036 HEMOGLOBIN GLYCOSYLATED A1C: CPT | Performed by: FAMILY MEDICINE

## 2022-12-14 DIAGNOSIS — R80.1 PERSISTENT PROTEINURIA: Primary | ICD-10-CM

## 2022-12-22 ENCOUNTER — LAB VISIT (OUTPATIENT)
Dept: LAB | Facility: HOSPITAL | Age: 52
End: 2022-12-22
Attending: FAMILY MEDICINE
Payer: COMMERCIAL

## 2022-12-22 ENCOUNTER — OFFICE VISIT (OUTPATIENT)
Dept: FAMILY MEDICINE | Facility: CLINIC | Age: 52
End: 2022-12-22
Payer: COMMERCIAL

## 2022-12-22 VITALS
SYSTOLIC BLOOD PRESSURE: 113 MMHG | TEMPERATURE: 97 F | HEART RATE: 79 BPM | BODY MASS INDEX: 28.37 KG/M2 | HEIGHT: 65 IN | DIASTOLIC BLOOD PRESSURE: 67 MMHG | OXYGEN SATURATION: 98 % | WEIGHT: 170.31 LBS

## 2022-12-22 DIAGNOSIS — E11.65 TYPE 2 DIABETES MELLITUS WITH HYPERGLYCEMIA, WITH LONG-TERM CURRENT USE OF INSULIN: ICD-10-CM

## 2022-12-22 DIAGNOSIS — Z79.4 TYPE 2 DIABETES MELLITUS WITH MICROALBUMINURIA, WITH LONG-TERM CURRENT USE OF INSULIN: ICD-10-CM

## 2022-12-22 DIAGNOSIS — E78.1 TYPE 2 DIABETES MELLITUS WITH HYPERTRIGLYCERIDEMIA: ICD-10-CM

## 2022-12-22 DIAGNOSIS — Z79.4 TYPE 2 DIABETES MELLITUS WITH HYPERGLYCEMIA, WITH LONG-TERM CURRENT USE OF INSULIN: ICD-10-CM

## 2022-12-22 DIAGNOSIS — E11.29 TYPE 2 DIABETES MELLITUS WITH MICROALBUMINURIA, WITH LONG-TERM CURRENT USE OF INSULIN: ICD-10-CM

## 2022-12-22 DIAGNOSIS — R80.9 TYPE 2 DIABETES MELLITUS WITH MICROALBUMINURIA, WITH LONG-TERM CURRENT USE OF INSULIN: ICD-10-CM

## 2022-12-22 DIAGNOSIS — E11.69 TYPE 2 DIABETES MELLITUS WITH HYPERTRIGLYCERIDEMIA: ICD-10-CM

## 2022-12-22 DIAGNOSIS — Z00.00 ROUTINE HISTORY AND PHYSICAL EXAMINATION OF ADULT: Primary | ICD-10-CM

## 2022-12-22 LAB — C PEPTIDE SERPL-MCNC: 4.45 NG/ML (ref 0.78–5.19)

## 2022-12-22 PROCEDURE — 84681 ASSAY OF C-PEPTIDE: CPT | Performed by: FAMILY MEDICINE

## 2022-12-22 PROCEDURE — 3066F NEPHROPATHY DOC TX: CPT | Mod: CPTII,S$GLB,, | Performed by: FAMILY MEDICINE

## 2022-12-22 PROCEDURE — 3074F SYST BP LT 130 MM HG: CPT | Mod: CPTII,S$GLB,, | Performed by: FAMILY MEDICINE

## 2022-12-22 PROCEDURE — 99999 PR PBB SHADOW E&M-EST. PATIENT-LVL V: ICD-10-PCS | Mod: PBBFAC,,, | Performed by: FAMILY MEDICINE

## 2022-12-22 PROCEDURE — 1159F MED LIST DOCD IN RCRD: CPT | Mod: CPTII,S$GLB,, | Performed by: FAMILY MEDICINE

## 2022-12-22 PROCEDURE — 3008F PR BODY MASS INDEX (BMI) DOCUMENTED: ICD-10-PCS | Mod: CPTII,S$GLB,, | Performed by: FAMILY MEDICINE

## 2022-12-22 PROCEDURE — 3062F PR POS MACROALBUMINURIA RESULT DOCUMENTED/REVIEW: ICD-10-PCS | Mod: CPTII,S$GLB,, | Performed by: FAMILY MEDICINE

## 2022-12-22 PROCEDURE — 99396 PREV VISIT EST AGE 40-64: CPT | Mod: S$GLB,,, | Performed by: FAMILY MEDICINE

## 2022-12-22 PROCEDURE — 3074F PR MOST RECENT SYSTOLIC BLOOD PRESSURE < 130 MM HG: ICD-10-PCS | Mod: CPTII,S$GLB,, | Performed by: FAMILY MEDICINE

## 2022-12-22 PROCEDURE — 3078F PR MOST RECENT DIASTOLIC BLOOD PRESSURE < 80 MM HG: ICD-10-PCS | Mod: CPTII,S$GLB,, | Performed by: FAMILY MEDICINE

## 2022-12-22 PROCEDURE — 99999 PR PBB SHADOW E&M-EST. PATIENT-LVL V: CPT | Mod: PBBFAC,,, | Performed by: FAMILY MEDICINE

## 2022-12-22 PROCEDURE — 3046F PR MOST RECENT HEMOGLOBIN A1C LEVEL > 9.0%: ICD-10-PCS | Mod: CPTII,S$GLB,, | Performed by: FAMILY MEDICINE

## 2022-12-22 PROCEDURE — 36415 COLL VENOUS BLD VENIPUNCTURE: CPT | Mod: PO | Performed by: FAMILY MEDICINE

## 2022-12-22 PROCEDURE — 3078F DIAST BP <80 MM HG: CPT | Mod: CPTII,S$GLB,, | Performed by: FAMILY MEDICINE

## 2022-12-22 PROCEDURE — 3046F HEMOGLOBIN A1C LEVEL >9.0%: CPT | Mod: CPTII,S$GLB,, | Performed by: FAMILY MEDICINE

## 2022-12-22 PROCEDURE — 3062F POS MACROALBUMINURIA REV: CPT | Mod: CPTII,S$GLB,, | Performed by: FAMILY MEDICINE

## 2022-12-22 PROCEDURE — 4010F ACE/ARB THERAPY RXD/TAKEN: CPT | Mod: CPTII,S$GLB,, | Performed by: FAMILY MEDICINE

## 2022-12-22 PROCEDURE — 3066F PR DOCUMENTATION OF TREATMENT FOR NEPHROPATHY: ICD-10-PCS | Mod: CPTII,S$GLB,, | Performed by: FAMILY MEDICINE

## 2022-12-22 PROCEDURE — 1159F PR MEDICATION LIST DOCUMENTED IN MEDICAL RECORD: ICD-10-PCS | Mod: CPTII,S$GLB,, | Performed by: FAMILY MEDICINE

## 2022-12-22 PROCEDURE — 99396 PR PREVENTIVE VISIT,EST,40-64: ICD-10-PCS | Mod: S$GLB,,, | Performed by: FAMILY MEDICINE

## 2022-12-22 PROCEDURE — 3008F BODY MASS INDEX DOCD: CPT | Mod: CPTII,S$GLB,, | Performed by: FAMILY MEDICINE

## 2022-12-22 PROCEDURE — 4010F PR ACE/ARB THEARPY RXD/TAKEN: ICD-10-PCS | Mod: CPTII,S$GLB,, | Performed by: FAMILY MEDICINE

## 2022-12-22 RX ORDER — INSULIN ASPART 100 [IU]/ML
20 INJECTION, SOLUTION INTRAVENOUS; SUBCUTANEOUS
Qty: 27 ML | Refills: 5 | Status: SHIPPED | OUTPATIENT
Start: 2022-12-22 | End: 2023-12-27 | Stop reason: SDUPTHER

## 2022-12-22 RX ORDER — INSULIN GLARGINE 100 [IU]/ML
20 INJECTION, SOLUTION SUBCUTANEOUS NIGHTLY
Qty: 15 ML | Refills: 11 | Status: SHIPPED | OUTPATIENT
Start: 2022-12-22 | End: 2023-06-26

## 2022-12-22 RX ORDER — FENOFIBRATE 48 MG/1
48 TABLET, FILM COATED ORAL DAILY
Qty: 90 TABLET | Refills: 3 | Status: SHIPPED | OUTPATIENT
Start: 2022-12-22 | End: 2024-01-23

## 2022-12-22 RX ORDER — CLONAZEPAM 1 MG/1
1 TABLET ORAL NIGHTLY
COMMUNITY
Start: 2022-10-19 | End: 2022-12-22 | Stop reason: SDUPTHER

## 2022-12-22 NOTE — PROGRESS NOTES
Patient presents physical, poorly controlled diabetes.  Did not bring in glucose readings, but states mostly all above 300.  States compliance with medications though she was out of her Trulicity for about a month possibly in August due to cost concerns.  Her blood pressure is controlled.  Recent lipids reviewed with significant triglyceride elevations.  States compliance with Crestor and Vascepa.  Pending eye exam.  Due for mammogram next month.    Yarely was seen today for annual exam.    Diagnoses and all orders for this visit:    Routine history and physical examination of adult    Type 2 diabetes mellitus with hypertriglyceridemia  -     Ambulatory referral/consult to Diabetic Advanced Practice Providers (Medical Management); Future  -     Mammo Digital Screening Bilat w/ Esau; Future  -     Lipid Panel; Future  -     Comprehensive Metabolic Panel; Future  -     Hemoglobin A1C; Future    Type 2 diabetes mellitus with microalbuminuria, with long-term current use of insulin    Type 2 diabetes mellitus with hyperglycemia, with long-term current use of insulin  -     C-Peptide; Future    Other orders  -     insulin (LANTUS SOLOSTAR U-100 INSULIN) glargine 100 units/mL SubQ pen; Inject 20 Units into the skin every evening. May increase by 2 units every 3 days if fasting morning glucose over 130. Max 50 units  -     fenofibrate (TRICOR) 48 MG tablet; Take 1 tablet (48 mg total) by mouth once daily.  -     insulin aspart U-100 (NOVOLOG FLEXPEN U-100 INSULIN) 100 unit/mL (3 mL) InPn pen; Inject 20 Units into the skin 3 (three) times daily before meals. +scale.<200=+0u, 201-250=+2u,251-300=+4u, 301-350=+6u, >350=+8u. Max 84 units/day      Add Lantus as above.  Add fenofibrate.  She can discontinue the septa.  Continue other current medication.  Arrange appointment with endocrine nurse practitioner.  Bring in glucose readings to that appointment.  Check C-peptide today.  Repeat other blood work in 3 months.  Recommend  COVID booster at pharmacy.          Diabetes Management Status    Statin: Taking  ACE/ARB: Taking    Screening or Prevention Patient's value Goal Complete/Controlled?   HgA1C Testing and Control   Lab Results   Component Value Date    HGBA1C 12.4 (H) 12/09/2022      Annually/Less than 8% No     Lipid profile : 12/09/2022 Annually Yes     LDL control Lab Results   Component Value Date    LDLCALC Invalid, Trig>400.0 12/09/2022    Annually/Less than 100 mg/dl  Yes     Nephropathy screening Lab Results   Component Value Date    LABMICR 1487.0 05/30/2022     No results found for: PROTEINUA Annually Yes     Blood pressure BP Readings from Last 1 Encounters:   12/22/22 113/67    Less than 140/90 Yes     Dilated retinal exam : 05/30/2019 Annually Yes     Foot exam   : 06/06/2022 Annually Yes         Past Medical History:  Past Medical History:   Diagnosis Date    Anxiety     Cholelithiasis     Depression     Fatty liver     HTN (hypertension)     Hyperlipidemia     Proteinuria     Dr. Sadler    Severe obesity (BMI 35.0-35.9 with comorbidity) 3/16/2018    ADEN (stress urinary incontinence, female)     Type 2 diabetes mellitus with hypertriglyceridemia 6/6/2022    Type 2 diabetes mellitus with microalbuminuria, with long-term current use of insulin 7/22/2016    Vitamin D insufficiency      No past surgical history on file.  Review of patient's allergies indicates:   Allergen Reactions    Pcn [penicillins] Rash     Current Outpatient Medications on File Prior to Visit   Medication Sig Dispense Refill    clonazePAM (KLONOPIN) 0.5 MG tablet Take 0.5 mg by mouth 2 (two) times daily as needed.  2    dulaglutide (TRULICITY) 3 mg/0.5 mL pen injector Inject 3 mg into the skin every 7 days. 4 pen 11    lisinopriL (PRINIVIL,ZESTRIL) 20 MG tablet TAKE ONE TABLET BY MOUTH once DAILY 90 tablet 2    metFORMIN (GLUCOPHAGE-XR) 500 MG ER 24hr tablet TAKE 4 TABLETS BY MOUTH EVERY DAY WITH BREAKFAST 360 tablet 3    rosuvastatin (CRESTOR) 40 MG  "Tab TAKE ONE TABLET BY MOUTH once DAILY 90 tablet 2    sertraline (ZOLOFT) 100 MG tablet Take 200 mg by mouth once daily.  2    [DISCONTINUED] icosapent ethyL (VASCEPA) 1 gram Cap Take 2 capsules (2 g total) by mouth 2 (two) times daily with meals. 360 capsule 3    [DISCONTINUED] insulin aspart U-100 (NOVOLOG FLEXPEN U-100 INSULIN) 100 unit/mL (3 mL) InPn pen Inject 20 Units into the skin 3 (three) times daily before meals. +scale.<200=+0u, 201-250=+2u,251-300=+4u, 301-350=+6u, >350=+8u 15 mL 0    aripiprazole (ABILIFY) 5 MG Tab Take 1 tablet (5 mg total) by mouth once daily. 30 tablet 0    betamethasone dipropionate (DIPROLENE) 0.05 % ointment Apply topically 2 (two) times daily as needed (psoriasis). 45 g 1    blood-glucose meter kit Use as directed      blood-glucose meter,continuous (DEXCOM G6 ) Misc Tests glucose 4 times daily 1 each prn    blood-glucose sensor (DEXCOM G6 SENSOR) Dana Tests glucose 4 times daily 3 each 11    blood-glucose transmitter (DEXCOM G6 TRANSMITTER) Dana Tests glucose 4 times daily 1 each 3    flash glucose scanning reader (FREESTYLE VIRGILIO 14 DAY READER) Misc Test glucose 3 times daily 1 each prn    flash glucose sensor (FREESTYLE VIRGILIO 14 DAY SENSOR) Kit USE TO test glucose THREE TIMES DAILY 6 kit 3    SURE COMFORT PEN NEEDLE 31 gauge x 3/16" Ndle USE TO INJECT INSULINS FOUR TIMES DAILY 100 each 5    [DISCONTINUED] busPIRone (BUSPAR) 5 MG Tab Take 5 mg by mouth 2 (two) times daily.      [DISCONTINUED] clonazePAM (KLONOPIN) 1 MG tablet Take 1 mg by mouth every evening.      [DISCONTINUED] nicotine (NICODERM CQ) 21 mg/24 hr Place 2 patches onto the skin once daily. Dose appropriate (Patient not taking: Reported on 9/27/2022) 28 patch 0    [DISCONTINUED] nicotine, polacrilex, 2 mg lzmn Can take 1-2 per hour in place of a cigarette for breakthrough cravings in place of a cigarette. do not chew (Patient not taking: Reported on 9/27/2022) 81 each 0    [DISCONTINUED] varenicline " "(CHANTIX) 1 mg Tab Take 1 tablet (1 mg total) by mouth 2 (two) times daily. (Patient not taking: Reported on 2022) 56 tablet 0     No current facility-administered medications on file prior to visit.     Social History     Socioeconomic History    Marital status:    Tobacco Use    Smoking status: Former     Packs/day: 1.50     Years: 28.00     Pack years: 42.00     Types: Cigarettes     Quit date: 2022     Years since quittin.3    Smokeless tobacco: Never   Substance and Sexual Activity    Alcohol use: No     Family History   Problem Relation Age of Onset    Hypertension Mother     Diabetes Mother     Lymphoma Father     Hypertension Father     Diabetes Father     Heart disease Brother 57    Ovarian cancer Maternal Grandmother     Breast cancer Maternal Aunt     Kidney disease Neg Hx            ROS:  GENERAL: No fever, chills,  or significant weight changes.   CARDIOVASCULAR: Denies chest pain, PND, orthopnea or reduced exercise tolerance.  ABDOMEN: Appetite fine. Denies diarrhea, abdominal pain, hematemesis or blood in stool.  URINARY: No flank pain, dysuria or hematuria.    Vitals:    22 0816   BP: 113/67   Pulse: 79   Temp: 97.3 °F (36.3 °C)   TempSrc: Temporal   SpO2: 98%   Weight: 77.3 kg (170 lb 4.9 oz)   Height: 5' 5" (1.651 m)       Wt Readings from Last 3 Encounters:   22 77.3 kg (170 lb 4.9 oz)   22 82.1 kg (181 lb)   22 78.9 kg (174 lb)       APPEARANCE: Well nourished, well developed, in no acute distress.    HEAD: Normocephalic.  Atraumatic.  EYES:   Right eye: Pupil reactive.  Conjunctiva clear.    Left eye: Pupil reactive.  Conjunctiva clear.    NECK: Supple. No bruits.  No JVD.  No cervical lymphadenopathy.  No thyromegaly.    CHEST: Breath sounds clear bilaterally.  Normal respiratory effort  CARDIOVASCULAR: Normal rate.  Regular rhythm.  No murmurs.  No rub.  No gallops.   No edema.  MENTAL STATUS: Alert.  Oriented x 3.        "

## 2022-12-27 ENCOUNTER — TELEPHONE (OUTPATIENT)
Dept: FAMILY MEDICINE | Facility: CLINIC | Age: 52
End: 2022-12-27
Payer: COMMERCIAL

## 2022-12-27 RX ORDER — FLUCONAZOLE 150 MG/1
150 TABLET ORAL ONCE
Qty: 1 TABLET | Refills: 0 | Status: SHIPPED | OUTPATIENT
Start: 2022-12-27 | End: 2022-12-27

## 2022-12-27 NOTE — TELEPHONE ENCOUNTER
Sent prescription for Diflucan as she is recently had poorly controlled diabetes and is prone to yeast infection due to this.  Follow-up if symptoms not resolve with this.  Do not use the body wash at the genital area.

## 2022-12-27 NOTE — TELEPHONE ENCOUNTER
Started to use a new body wash and now has c/o burning and itch to vaginal area, denies discharge, please advise.

## 2022-12-27 NOTE — TELEPHONE ENCOUNTER
----- Message from Litzy Mccoy sent at 12/27/2022  3:09 PM CST -----  Pt is requesting the nurse give her a call at .[phone] thx jm

## 2023-01-03 ENCOUNTER — TELEPHONE (OUTPATIENT)
Dept: FAMILY MEDICINE | Facility: CLINIC | Age: 53
End: 2023-01-03
Payer: COMMERCIAL

## 2023-01-03 NOTE — TELEPHONE ENCOUNTER
----- Message from Millie Llanes sent at 1/3/2023  1:41 PM CST -----  Contact: Yarely  Type:  RX Refill Request    Who Called: Yarely   Refill or New Rx:refill  RX Name and Strength:Metformin (GLUCOPHAGE-XR) 500 MG ER 24hr tablet   How is the patient currently taking it? (ex. 1XDay):4xday  Is this a 30 day or 90 day RX:30day  Preferred Pharmacy with phone number:  [facility] - [place] - [address] [facility]  [place] [address]  Phone: [phone] Fax: [phone]  Local or Mail Order:local  Ordering Provider:   Would the patient rather a call back or a response via [facility]? Call back   Best Call Back Number:[phone]  Additional Information:  Patient is currently out of medication.  Thanks   Am

## 2023-01-12 ENCOUNTER — TELEPHONE (OUTPATIENT)
Dept: SMOKING CESSATION | Facility: CLINIC | Age: 53
End: 2023-01-12
Payer: COMMERCIAL

## 2023-01-12 NOTE — TELEPHONE ENCOUNTER
Attempted to call patient to reschedule their smoking cessation appointment. Left a message with my contact information to reschedule the appointment. Cheryl Alvarez [phone].

## 2023-01-13 ENCOUNTER — TELEPHONE (OUTPATIENT)
Dept: FAMILY MEDICINE | Facility: CLINIC | Age: 53
End: 2023-01-13
Payer: COMMERCIAL

## 2023-01-13 NOTE — TELEPHONE ENCOUNTER
----- Message from Janet Howe sent at 1/13/2023  9:39 AM CST -----  Contact: [phone]  Patient is requesting a call in regards to Trulicity,Please call back at [phone].Thanks

## 2023-01-13 NOTE — TELEPHONE ENCOUNTER
Pt asking if she should be on lantus and trulicity, informed per last office note he added lantus and did not discontinue Trulicity.

## 2023-02-02 ENCOUNTER — TELEPHONE (OUTPATIENT)
Dept: SMOKING CESSATION | Facility: CLINIC | Age: 53
End: 2023-02-02
Payer: COMMERCIAL

## 2023-02-02 NOTE — TELEPHONE ENCOUNTER
Attempted to call patient to reschedule their smoking cessation appointment. Not able to leave a message with my contact information to reschedule the appointment, due to being disconnected immediately. Cheryl Alvarez [phone].

## 2023-02-09 ENCOUNTER — TELEPHONE (OUTPATIENT)
Dept: FAMILY MEDICINE | Facility: CLINIC | Age: 53
End: 2023-02-09
Payer: COMMERCIAL

## 2023-02-09 NOTE — TELEPHONE ENCOUNTER
----- Message from Josephine Washington sent at 2/9/2023  8:42 AM CST -----  Contact: Yarely  Patient is calling to speak with nurse regarding medication. Reports the previous medication worked however she still has an itch on her vaginal area and would like a topical cream/ointment. Please give patient a call back at [phone].

## 2023-02-09 NOTE — TELEPHONE ENCOUNTER
Recommend Monistat cream.  This would be over-the-counter.  Recommend appointment in the clinic if symptoms not improving.

## 2023-02-27 ENCOUNTER — TELEPHONE (OUTPATIENT)
Dept: DIABETES | Facility: CLINIC | Age: 53
End: 2023-02-27
Payer: COMMERCIAL

## 2023-02-27 NOTE — TELEPHONE ENCOUNTER
Working the diabetes WQ Called patient to discuss rescheduling virtual appointment Patient declined to schedule at this time stating she has been taking her medication incorrectly and would like to wait a few months before scheduling

## 2023-03-16 ENCOUNTER — PATIENT MESSAGE (OUTPATIENT)
Dept: FAMILY MEDICINE | Facility: CLINIC | Age: 53
End: 2023-03-16
Payer: COMMERCIAL

## 2023-03-20 ENCOUNTER — PATIENT OUTREACH (OUTPATIENT)
Dept: ADMINISTRATIVE | Facility: HOSPITAL | Age: 53
End: 2023-03-20
Payer: COMMERCIAL

## 2023-04-13 ENCOUNTER — PATIENT OUTREACH (OUTPATIENT)
Dept: ADMINISTRATIVE | Facility: HOSPITAL | Age: 53
End: 2023-04-13
Payer: COMMERCIAL

## 2023-04-13 NOTE — LETTER
AUTHORIZATION FOR RELEASE OF   CONFIDENTIAL INFORMATION    Dear Corrine Junior NP/Medical Records,    We are seeing Yarely Thompson, date of birth 1970, in the clinic at [facility]. Mikey Ayon MD is the patient's PCP. Yarely Thompson has an outstanding HA1C at the time we reviewed her chart. In order to help keep her health information updated, she has authorized us to request the following medical record(s):        (  )  MAMMOGRAM                                      (  )  COLONOSCOPY      (  )  PAP SMEAR                                          ( X )  OUTSIDE LAB RESULTS     (  )  DEXA SCAN                                          (  )  EYE EXAM            (  )  FOOT EXAM                                          (  )  ENTIRE RECORD     (  )  OUTSIDE IMMUNIZATIONS                 ( X )  HA1C    Our records show pt has a Ha1c that was done by Mrs Junior 2023, please fax result so we can update pt chart.         [facility], Andra W LPN -[phone]     If you have any questions, please contact Minal HYDE LPN [phone].           Patient Name: Yarely Thompson  : 1970  Patient Phone #: [phone]     Thanks  Minal HYDE [facility]  Care Coordination Department  [facility]

## 2023-04-13 NOTE — PROGRESS NOTES
Lab Clean UP: Working report, external Ha1c claim 02/3/23 Corrine Junior NP ph [phone], will request record fax [phone] and set remind me x's 1 wk.

## 2023-04-14 ENCOUNTER — OFFICE VISIT (OUTPATIENT)
Dept: OPTOMETRY | Facility: CLINIC | Age: 53
End: 2023-04-14
Payer: COMMERCIAL

## 2023-04-14 DIAGNOSIS — E11.3293 MILD NONPROLIFERATIVE DIABETIC RETINOPATHY OF BOTH EYES WITHOUT MACULAR EDEMA ASSOCIATED WITH TYPE 2 DIABETES MELLITUS: Primary | ICD-10-CM

## 2023-04-14 DIAGNOSIS — H52.4 BILATERAL PRESBYOPIA: ICD-10-CM

## 2023-04-14 PROCEDURE — 92004 COMPRE OPH EXAM NEW PT 1/>: CPT | Mod: S$GLB,,, | Performed by: OPTOMETRIST

## 2023-04-14 PROCEDURE — 99999 PR PBB SHADOW E&M-EST. PATIENT-LVL III: ICD-10-PCS | Mod: PBBFAC,,, | Performed by: OPTOMETRIST

## 2023-04-14 PROCEDURE — 1159F MED LIST DOCD IN RCRD: CPT | Mod: CPTII,S$GLB,, | Performed by: OPTOMETRIST

## 2023-04-14 PROCEDURE — 4010F PR ACE/ARB THEARPY RXD/TAKEN: ICD-10-PCS | Mod: CPTII,S$GLB,, | Performed by: OPTOMETRIST

## 2023-04-14 PROCEDURE — 4010F ACE/ARB THERAPY RXD/TAKEN: CPT | Mod: CPTII,S$GLB,, | Performed by: OPTOMETRIST

## 2023-04-14 PROCEDURE — 1160F RVW MEDS BY RX/DR IN RCRD: CPT | Mod: CPTII,S$GLB,, | Performed by: OPTOMETRIST

## 2023-04-14 PROCEDURE — 1159F PR MEDICATION LIST DOCUMENTED IN MEDICAL RECORD: ICD-10-PCS | Mod: CPTII,S$GLB,, | Performed by: OPTOMETRIST

## 2023-04-14 PROCEDURE — 1160F PR REVIEW ALL MEDS BY PRESCRIBER/CLIN PHARMACIST DOCUMENTED: ICD-10-PCS | Mod: CPTII,S$GLB,, | Performed by: OPTOMETRIST

## 2023-04-14 PROCEDURE — 99999 PR PBB SHADOW E&M-EST. PATIENT-LVL III: CPT | Mod: PBBFAC,,, | Performed by: OPTOMETRIST

## 2023-04-14 PROCEDURE — 92004 PR EYE EXAM, NEW PATIENT,COMPREHESV: ICD-10-PCS | Mod: S$GLB,,, | Performed by: OPTOMETRIST

## 2023-04-14 NOTE — PROGRESS NOTES
HPI    New pt here for annual DM eye exam DLS- 05/04/18      Pt states her vision has been stable with OTC readers +3.25.  DM has not been stable, HTN has been stable on meds.   Pt denies F/F and GTTS.   Pts vision will change when are sugars are high.   Hemoglobin A1C       Date                     Value               Ref Range             Status                12/09/2022               12.4 (H)            4.0 - 5.6 %           Final              Comment:    ADA Screening Guidelines:  5.7-6.4%  Consistent with   prediabetes  >or=6.5%  Consistent with diabetes    High levels of fetal   hemoglobin interfere with the HbA1C  assay. Heterozygous hemoglobin   variants (HbS, HgC, etc)do  not significantly interfere with this assay.     However, presence of multiple variants may affect accuracy.         05/30/2022               11.5 (H)            4.0 - 5.6 %           Final              Comment:    ADA Screening Guidelines:  5.7-6.4%  Consistent with   prediabetes  >or=6.5%  Consistent with diabetes    High levels of fetal   hemoglobin interfere with the HbA1C  assay. Heterozygous hemoglobin   variants (HbS, HgC, etc)do  not significantly interfere with this assay.     However, presence of multiple variants may affect accuracy.         09/30/2021               10.3 (H)            4.0 - 5.6 %           Final              Comment:    ADA Screening Guidelines:  5.7-6.4%  Consistent with   prediabetes  >or=6.5%  Consistent with diabetes    High levels of fetal   hemoglobin interfere with the HbA1C  assay. Heterozygous hemoglobin   variants (HbS, HgC, etc)do  not significantly interfere with this assay.     However, presence of multiple variants may affect accuracy.    ----------   Last edited by Ro Trivedi on 4/14/2023 10:52 AM.            Assessment /Plan     For exam results, see Encounter Report.    Mild nonproliferative diabetic retinopathy of both eyes without macular edema associated with type 2 diabetes  mellitus    Bilateral presbyopia      Mild diabetic retinopathy, no csme. Return in 1 year for dilated eye exam.   2. Spectacle Rx given, discussed different options for glasses. RTC 1 year routine eye exam.

## 2023-04-28 ENCOUNTER — HOSPITAL ENCOUNTER (OUTPATIENT)
Dept: RADIOLOGY | Facility: HOSPITAL | Age: 53
Discharge: HOME | End: 2023-04-28
Attending: FAMILY MEDICINE
Payer: COMMERCIAL

## 2023-04-28 VITALS — HEIGHT: 65 IN | BODY MASS INDEX: 29.87 KG/M2 | WEIGHT: 179.31 LBS

## 2023-04-28 DIAGNOSIS — E78.1 TYPE 2 DIABETES MELLITUS WITH HYPERTRIGLYCERIDEMIA: ICD-10-CM

## 2023-04-28 DIAGNOSIS — E11.69 TYPE 2 DIABETES MELLITUS WITH HYPERTRIGLYCERIDEMIA: ICD-10-CM

## 2023-04-28 PROCEDURE — 77063 BREAST TOMOSYNTHESIS BI: CPT | Mod: 26,,, | Performed by: RADIOLOGY

## 2023-04-28 PROCEDURE — 77067 MAMMO DIGITAL SCREENING BILAT WITH TOMO: ICD-10-PCS | Mod: 26,,, | Performed by: RADIOLOGY

## 2023-04-28 PROCEDURE — 77067 SCR MAMMO BI INCL CAD: CPT | Mod: TC,PO

## 2023-04-28 PROCEDURE — 77067 SCR MAMMO BI INCL CAD: CPT | Mod: 26,,, | Performed by: RADIOLOGY

## 2023-04-28 PROCEDURE — 77063 MAMMO DIGITAL SCREENING BILAT WITH TOMO: ICD-10-PCS | Mod: 26,,, | Performed by: RADIOLOGY

## 2023-05-08 ENCOUNTER — TELEPHONE (OUTPATIENT)
Dept: NEPHROLOGY | Facility: CLINIC | Age: 53
End: 2023-05-08
Payer: COMMERCIAL

## 2023-05-08 NOTE — TELEPHONE ENCOUNTER
----- Message from Juan Sadler MD sent at 5/5/2023  5:52 PM CDT -----  Six canceled appts since her initial Consult 7/2019.  May need a phone call from you to see if she is seeing another Nephrologist or..?  ----- Message -----  From: Annalisa Juarez LPN  Sent: 4/18/2023  11:37 AM CDT  To: Juan Sadler MD    Does patient need new labs before May visit?

## 2023-05-08 NOTE — TELEPHONE ENCOUNTER
Patient was called and says she would like to have labs done for Dr. Sadler with an office visit. Please orders labs for a July visit.

## 2023-05-09 ENCOUNTER — TELEPHONE (OUTPATIENT)
Dept: FAMILY MEDICINE | Facility: CLINIC | Age: 53
End: 2023-05-09
Payer: COMMERCIAL

## 2023-05-09 NOTE — TELEPHONE ENCOUNTER
Pt was advised of Dr Ayon response and recommendations . She said that she would prefer to see the endo diabetic ed . I will see what I need to do to get that sched. She said that she will call me tomorrow to set up a fasting lab appt and spec. Appt

## 2023-05-09 NOTE — TELEPHONE ENCOUNTER
Stop taking the metformin and we can see if this resolves the diarrhea.  Previously referred to the diabetes nurse practitioner, but I do not see where she had an appointment.  Please try to get this scheduled.  Please get lipid CMP hemoglobin A1c urine microalbumin and follow-up appointment with home glucose readings either with me or preferably with the endocrine nurse practitioner.  We could also refer her to [facility] if she has trouble getting in to our diabetes nurse practitioner.  She likely will need her insulin adjusted if she is getting off the metformin as her sugar may go up even more and was not well controlled previously..

## 2023-05-09 NOTE — TELEPHONE ENCOUNTER
Spoke w/ pt , offered an appt and she said that she cannot come in . She said that she has spoken with you before regarding this . She said that she has been on med for 8 years but the diarrhea is getting worse. Please advise, thanks

## 2023-05-09 NOTE — TELEPHONE ENCOUNTER
----- Message from Arlette Bowden sent at 5/9/2023  1:39 PM CDT -----  Contact: Pt @[phone]  1MEDICALADVICE     Patient is calling for Medical Advice regarding:  Diarrhea     How long has patient had these symptoms:  For a while     Pharmacy name and phone#:    Would like response via [facility]:  ##    Comments:   Pt states that the metFORMIN (GLUCOPHAGE-XR) 500 MG ER 24hr tablet is not working and making her have diarrhea. Pt is requesting a call back to advise.

## 2023-05-10 ENCOUNTER — TELEPHONE (OUTPATIENT)
Dept: FAMILY MEDICINE | Facility: CLINIC | Age: 53
End: 2023-05-10
Payer: COMMERCIAL

## 2023-05-10 ENCOUNTER — TELEPHONE (OUTPATIENT)
Dept: NEPHROLOGY | Facility: CLINIC | Age: 53
End: 2023-05-10
Payer: COMMERCIAL

## 2023-05-10 NOTE — TELEPHONE ENCOUNTER
----- Message from Roger Bolton sent at 5/10/2023 11:43 AM CDT -----  Contact: self  Type: Sooner Appointment Request        Caller is requesting a sooner appointment. Caller declined first available appointment listed below. Caller will not accept being placed on the waitlist and is requesting a message be sent to doctor.        Name of Caller: patient   Best Call Back Number: [phone]  Additional Information: Pt wants to get back in has labs 6/9/23 pt wants to be seen after but wants to know can she be seen before August 2nd. Plz call pt to get her scheduled sooner. Thanks

## 2023-05-10 NOTE — TELEPHONE ENCOUNTER
----- Message from Onel Nayak sent at 5/10/2023 11:20 AM CDT -----  Contact: [phone]  Pt is calling to speak with nurse . Please call back at [phone] . Thanksdj

## 2023-06-06 ENCOUNTER — PATIENT OUTREACH (OUTPATIENT)
Dept: ADMINISTRATIVE | Facility: HOSPITAL | Age: 53
End: 2023-06-06
Payer: COMMERCIAL

## 2023-06-09 ENCOUNTER — LAB VISIT (OUTPATIENT)
Dept: LAB | Facility: HOSPITAL | Age: 53
End: 2023-06-09
Attending: FAMILY MEDICINE
Payer: COMMERCIAL

## 2023-06-09 DIAGNOSIS — Z79.4 TYPE 2 DIABETES MELLITUS WITHOUT COMPLICATION, WITH LONG-TERM CURRENT USE OF INSULIN: ICD-10-CM

## 2023-06-09 DIAGNOSIS — E11.9 TYPE 2 DIABETES MELLITUS WITHOUT COMPLICATION, WITH LONG-TERM CURRENT USE OF INSULIN: ICD-10-CM

## 2023-06-09 LAB
ALBUMIN/CREAT UR: 2340.8 UG/MG (ref 0–30)
CREAT UR-MCNC: 98 MG/DL (ref 15–325)
MICROALBUMIN UR DL<=1MG/L-MCNC: 2294 UG/ML

## 2023-06-09 PROCEDURE — 82570 ASSAY OF URINE CREATININE: CPT | Performed by: FAMILY MEDICINE

## 2023-06-13 NOTE — PROGRESS NOTES
Subjective:       Patient ID: Yarely Thompson is a 52 y.o. female.    Chief Complaint: Microalbuminuria     The patient location is:  home     Visit type: audiovisual    Each patient to whom he or she provides medical services by telemedicine is:  (1) informed of the relationship between the physician and patient and the respective role of any other health care provider with respect to management of the patient; and (2) notified that he or she may decline to receive medical services by telemedicine and may withdraw from such care at any time.    HPI  Patient presents today for routine follow-up via audiovisual visit.  Pt denies taking NSAIDs, no GI losses, no abx use, no recent infections, no dysuria, no cardiopulmonary sx's.  Laboratory studies and medications were reviewed.  Since pt's last office visit, states doing well with no specific or new complaints voiced.  She does report noncompliance with diabetic diet.  Reiterated importance of good glycemic control in relation to long term cardiac and renal health.  Will refer to DM management.  All Nephrology related questions were answered to the pt's satisfaction.    PAST MEDICAL HISTORY:  She  has a past medical history of Anxiety, Cholelithiasis, Depression, Fatty liver, HTN (hypertension), Hyperlipidemia, Proteinuria, Severe obesity (BMI 35.0-35.9 with comorbidity) (3/16/2018), ADEN (stress urinary incontinence, female), Type 2 diabetes mellitus with hypertriglyceridemia (6/6/2022), Type 2 diabetes mellitus with microalbuminuria, with long-term current use of insulin (7/22/2016), and Vitamin D insufficiency.    PAST SURGICAL HISTORY:  She  has no past surgical history on file.    SOCIAL HISTORY:  She  reports that she quit smoking about 10 months ago. Her smoking use included cigarettes. She has a 42.00 pack-year smoking history. She has never used smokeless tobacco. She reports that she does not drink alcohol.    FAMILY MEDICAL HISTORY:  Her family history includes  Breast cancer in her maternal aunt; Diabetes in her father and mother; Heart disease (age of onset: 57) in her brother; Hypertension in her father and mother; Lymphoma in her father; Ovarian cancer in her maternal grandmother.    Review of patient's allergies indicates:   Allergen Reactions    Pcn [penicillins] Rash       Review of Systems   Constitutional: Negative.    HENT: Negative.     Eyes: Negative.    Respiratory:  Negative for cough, shortness of breath and wheezing.    Cardiovascular:  Negative for chest pain, palpitations and leg swelling.   Gastrointestinal: Negative.    Endocrine: Negative.    Genitourinary: Negative.    Musculoskeletal: Negative.    Skin: Negative.    Neurological:  Negative for dizziness, light-headedness and headaches.   Psychiatric/Behavioral: Negative.         Lab Results   Component Value Date    WBC 9.92 10/08/2020    HGB 14.8 10/08/2020    HCT 47.4 10/08/2020    MCV 88 10/08/2020     (L) 10/08/2020        CMP  Sodium   Date Value Ref Range Status   06/09/2023 135 (L) 136 - 145 mmol/L Final     Potassium   Date Value Ref Range Status   06/09/2023 4.2 3.5 - 5.1 mmol/L Final     Chloride   Date Value Ref Range Status   06/09/2023 103 95 - 110 mmol/L Final     CO2   Date Value Ref Range Status   06/09/2023 24 23 - 29 mmol/L Final     Glucose   Date Value Ref Range Status   06/09/2023 290 (H) 70 - 110 mg/dL Final     BUN   Date Value Ref Range Status   06/09/2023 11 6 - 20 mg/dL Final     Creatinine   Date Value Ref Range Status   06/09/2023 0.8 0.5 - 1.4 mg/dL Final     Calcium   Date Value Ref Range Status   06/09/2023 9.6 8.7 - 10.5 mg/dL Final     Total Protein   Date Value Ref Range Status   06/09/2023 7.7 6.0 - 8.4 g/dL Final     Albumin   Date Value Ref Range Status   06/09/2023 4.0 3.5 - 5.2 g/dL Final     Total Bilirubin   Date Value Ref Range Status   06/09/2023 0.4 0.1 - 1.0 mg/dL Final     Comment:     For infants and newborns, interpretation of results should be  based  on gestational age, weight and in agreement with clinical  observations.    Premature Infant recommended reference ranges:  Up to 24 hours.............<8.0 mg/dL  Up to 48 hours............<12.0 mg/dL  3-5 days..................<15.0 mg/dL  6-29 days.................<15.0 mg/dL       Alkaline Phosphatase   Date Value Ref Range Status   06/09/2023 104 55 - 135 U/L Final     AST   Date Value Ref Range Status   06/09/2023 30 10 - 40 U/L Final     ALT   Date Value Ref Range Status   06/09/2023 23 10 - 44 U/L Final     Anion Gap   Date Value Ref Range Status   06/09/2023 8 8 - 16 mmol/L Final     eGFR if    Date Value Ref Range Status   05/30/2022 >60.0 >60 mL/min/1.73 m^2 Final   05/30/2022 >60.0 >60 mL/min/1.73 m^2 Final     eGFR if non    Date Value Ref Range Status   05/30/2022 >60.0 >60 mL/min/1.73 m^2 Final     Comment:     Calculation used to obtain the estimated glomerular filtration  rate (eGFR) is the CKD-EPI equation.      05/30/2022 >60.0 >60 mL/min/1.73 m^2 Final     Comment:     Calculation used to obtain the estimated glomerular filtration  rate (eGFR) is the CKD-EPI equation.          Current Outpatient Medications on File Prior to Visit   Medication Sig Dispense Refill    dulaglutide (TRULICITY) 3 mg/0.5 mL pen injector Inject 3 mg into the skin every 7 days. 4 pen 11    aripiprazole (ABILIFY) 5 MG Tab Take 1 tablet (5 mg total) by mouth once daily. 30 tablet 0    blood-glucose meter kit Use as directed      blood-glucose meter,continuous (DEXCOM G6 ) Misc Tests glucose 4 times daily 1 each prn    blood-glucose sensor (DEXCOM G6 SENSOR) Dana Tests glucose 4 times daily 3 each 11    blood-glucose transmitter (DEXCOM G6 TRANSMITTER) Dana Tests glucose 4 times daily 1 each 3    clonazePAM (KLONOPIN) 0.5 MG tablet Take 0.5 mg by mouth 2 (two) times daily as needed.  2    fenofibrate (TRICOR) 48 MG tablet Take 1 tablet (48 mg total) by mouth once daily. 90 tablet  "3    flash glucose scanning reader (FREESTYLE VIRGILIO 14 DAY READER) Misc Test glucose 3 times daily 1 each prn    flash glucose sensor (FREESTYLE VIRGILIO 14 DAY SENSOR) Kit USE TO TEST GLUCOSE THREE TIMES DAILY(CHANGE SENSOR EVERY 14 DAYS) 6 kit 3    insulin (LANTUS SOLOSTAR U-100 INSULIN) glargine 100 units/mL SubQ pen Inject 20 Units into the skin every evening. May increase by 2 units every 3 days if fasting morning glucose over 130. Max 50 units 15 mL 11    insulin aspart U-100 (NOVOLOG FLEXPEN U-100 INSULIN) 100 unit/mL (3 mL) InPn pen Inject 20 Units into the skin 3 (three) times daily before meals. +scale.<200=+0u, 201-250=+2u,251-300=+4u, 301-350=+6u, >350=+8u. Max 84 units/day 27 mL 5    lisinopriL (PRINIVIL,ZESTRIL) 20 MG tablet TAKE ONE TABLET BY MOUTH once DAILY 90 tablet 2    rosuvastatin (CRESTOR) 40 MG Tab TAKE ONE TABLET BY MOUTH once DAILY 90 tablet 2    sertraline (ZOLOFT) 100 MG tablet Take 200 mg by mouth once daily.  2    SURE COMFORT PEN NEEDLE 31 gauge x 3/16" Ndle USE TO INJECT INSULINS FOUR TIMES DAILY 100 each 5    [DISCONTINUED] betamethasone dipropionate (DIPROLENE) 0.05 % ointment Apply topically 2 (two) times daily as needed (psoriasis). 45 g 1    [DISCONTINUED] metFORMIN (GLUCOPHAGE-XR) 500 MG ER 24hr tablet TAKE 4 TABLETS BY MOUTH EVERY DAY WITH BREAKFAST 360 tablet 1     No current facility-administered medications on file prior to visit.       Objective:      PHYSICAL EXAM: looks comfortable on video  VS's could not be assessed   Gen:    WDWN male in no apparent distress  Normal mood and affect, normal speech    Assessment:       1. Hypertension associated with diabetes    2. Type 2 diabetes mellitus with microalbuminuria, with long-term current use of insulin    3. Obesity (BMI 30-39.9)        Plan:       BP is well controlled on current regimen per pt.  She reports home BP readings -130 and DBP 60-70.  Fortunately her GFR is >60ml/min at this point. Patient was advised to " avoid NSAIDs, continue a low sodium diet, and ensure hydration. Discussed importance of good BP control and glycemic control.  Continue ACEi as prescribed.      2. DM nephropathy with elevated microalb/Cr ratio.  Followed by PCP. Hb A1c 11.7.  On Trulicity and insulin at present.  Will add aldactone 25 mg q day to her lisinopril; stable K: 4.2. Check BP regularly and notify with any hypotensive readings. Encouraged lifestyle changes including regular exercise and limiting carbohydrates to 30-45 grams per meal threes times daily and 15 grams per snack with a limit of two daily.  Advised pt on importance of BP and diabetes control, wt control change in diet, avoiding sodas, fried food, and artificial sweeteners. Continue current regimen as prescribed.  Will refer to DM management.    3. Lifestyle and wt control discussed with pt; further weight loss would be beneficial.  Advised pt on importance of BP and diabetes control, wt control, calorie restriction/ change in diet.  Maintain low sodium and low carb diet as directed.  Avoiding sodas/sweets and fried food/boxed foods, etc as discussed today in clinic.  Increase daily exercise as tolerated.  Pt verbalized understanding.    Return to clinic in 6 mos    40 minutes of total time spent on the encounter, which includes face to face time and non-face to face time preparing to see the patient (eg, review of tests), obtaining and/or reviewing separately obtained history, documenting clinical information in the electronic or other health record, Independently interpreting results and communicating results to the patient/family/caregiver, or care coordination.    WILVER Carey

## 2023-06-14 ENCOUNTER — TELEPHONE (OUTPATIENT)
Dept: NEPHROLOGY | Facility: CLINIC | Age: 53
End: 2023-06-14

## 2023-06-14 ENCOUNTER — OFFICE VISIT (OUTPATIENT)
Dept: NEPHROLOGY | Facility: CLINIC | Age: 53
End: 2023-06-14
Payer: COMMERCIAL

## 2023-06-14 ENCOUNTER — PATIENT MESSAGE (OUTPATIENT)
Dept: NEPHROLOGY | Facility: CLINIC | Age: 53
End: 2023-06-14

## 2023-06-14 VITALS — BODY MASS INDEX: 29.82 KG/M2 | WEIGHT: 179 LBS | HEIGHT: 65 IN

## 2023-06-14 DIAGNOSIS — I15.2 HYPERTENSION ASSOCIATED WITH DIABETES: Primary | ICD-10-CM

## 2023-06-14 DIAGNOSIS — Z79.4 TYPE 2 DIABETES MELLITUS WITH MICROALBUMINURIA, WITH LONG-TERM CURRENT USE OF INSULIN: ICD-10-CM

## 2023-06-14 DIAGNOSIS — E66.9 OBESITY (BMI 30-39.9): ICD-10-CM

## 2023-06-14 DIAGNOSIS — E11.29 TYPE 2 DIABETES MELLITUS WITH MICROALBUMINURIA, WITH LONG-TERM CURRENT USE OF INSULIN: ICD-10-CM

## 2023-06-14 DIAGNOSIS — R80.9 TYPE 2 DIABETES MELLITUS WITH MICROALBUMINURIA, WITH LONG-TERM CURRENT USE OF INSULIN: ICD-10-CM

## 2023-06-14 DIAGNOSIS — E11.59 HYPERTENSION ASSOCIATED WITH DIABETES: Primary | ICD-10-CM

## 2023-06-14 PROCEDURE — 3066F NEPHROPATHY DOC TX: CPT | Mod: CPTII,95,, | Performed by: NURSE PRACTITIONER

## 2023-06-14 PROCEDURE — 1160F RVW MEDS BY RX/DR IN RCRD: CPT | Mod: CPTII,95,, | Performed by: NURSE PRACTITIONER

## 2023-06-14 PROCEDURE — 3046F PR MOST RECENT HEMOGLOBIN A1C LEVEL > 9.0%: ICD-10-PCS | Mod: CPTII,95,, | Performed by: NURSE PRACTITIONER

## 2023-06-14 PROCEDURE — 4010F ACE/ARB THERAPY RXD/TAKEN: CPT | Mod: CPTII,95,, | Performed by: NURSE PRACTITIONER

## 2023-06-14 PROCEDURE — 3046F HEMOGLOBIN A1C LEVEL >9.0%: CPT | Mod: CPTII,95,, | Performed by: NURSE PRACTITIONER

## 2023-06-14 PROCEDURE — 99215 PR OFFICE/OUTPT VISIT, EST, LEVL V, 40-54 MIN: ICD-10-PCS | Mod: 95,,, | Performed by: NURSE PRACTITIONER

## 2023-06-14 PROCEDURE — 3008F PR BODY MASS INDEX (BMI) DOCUMENTED: ICD-10-PCS | Mod: CPTII,95,, | Performed by: NURSE PRACTITIONER

## 2023-06-14 PROCEDURE — 4010F PR ACE/ARB THEARPY RXD/TAKEN: ICD-10-PCS | Mod: CPTII,95,, | Performed by: NURSE PRACTITIONER

## 2023-06-14 PROCEDURE — 3062F PR POS MACROALBUMINURIA RESULT DOCUMENTED/REVIEW: ICD-10-PCS | Mod: CPTII,95,, | Performed by: NURSE PRACTITIONER

## 2023-06-14 PROCEDURE — 1159F PR MEDICATION LIST DOCUMENTED IN MEDICAL RECORD: ICD-10-PCS | Mod: CPTII,95,, | Performed by: NURSE PRACTITIONER

## 2023-06-14 PROCEDURE — 3008F BODY MASS INDEX DOCD: CPT | Mod: CPTII,95,, | Performed by: NURSE PRACTITIONER

## 2023-06-14 PROCEDURE — 99215 OFFICE O/P EST HI 40 MIN: CPT | Mod: 95,,, | Performed by: NURSE PRACTITIONER

## 2023-06-14 PROCEDURE — 1160F PR REVIEW ALL MEDS BY PRESCRIBER/CLIN PHARMACIST DOCUMENTED: ICD-10-PCS | Mod: CPTII,95,, | Performed by: NURSE PRACTITIONER

## 2023-06-14 PROCEDURE — 1159F MED LIST DOCD IN RCRD: CPT | Mod: CPTII,95,, | Performed by: NURSE PRACTITIONER

## 2023-06-14 PROCEDURE — 3062F POS MACROALBUMINURIA REV: CPT | Mod: CPTII,95,, | Performed by: NURSE PRACTITIONER

## 2023-06-14 PROCEDURE — 3066F PR DOCUMENTATION OF TREATMENT FOR NEPHROPATHY: ICD-10-PCS | Mod: CPTII,95,, | Performed by: NURSE PRACTITIONER

## 2023-06-14 RX ORDER — SPIRONOLACTONE 25 MG/1
25 TABLET ORAL DAILY
Qty: 30 TABLET | Refills: 11 | Status: SHIPPED | OUTPATIENT
Start: 2023-06-14 | End: 2024-06-13

## 2023-06-19 ENCOUNTER — NURSE TRIAGE (OUTPATIENT)
Dept: ADMINISTRATIVE | Facility: CLINIC | Age: 53
End: 2023-06-19
Payer: COMMERCIAL

## 2023-06-19 NOTE — TELEPHONE ENCOUNTER
Pt reports an abscess near her vaginal area. Reports she was evaluated at an [facility] but they were unable to drain it for her. Pain is 9/10. Advised, per protocol and verbalizes understanding. Plans to be evaluated in the ED.     Reason for Disposition   SEVERE pain (e.g., excruciating)    Additional Information   Negative: Widespread rash and bright red, sunburn-like and too weak to stand   Negative: Sounds like a life-threatening emergency to the triager   Negative: Widespread red rash   Negative: Black (necrotic) color or blisters develop in wound   Negative: Patient sounds very sick or weak to the triager    Protocols used: Boil (Skin Abscess)-A-OH

## 2023-06-19 NOTE — TELEPHONE ENCOUNTER
Reiterated to patient to go to ER for reported large boil to vaginal area, c/o severe, excrutiating pain and urgent care provider would not [place] it over weekend

## 2023-06-21 ENCOUNTER — PATIENT MESSAGE (OUTPATIENT)
Dept: FAMILY MEDICINE | Facility: CLINIC | Age: 53
End: 2023-06-21
Payer: COMMERCIAL

## 2023-06-22 ENCOUNTER — TELEPHONE (OUTPATIENT)
Dept: FAMILY MEDICINE | Facility: CLINIC | Age: 53
End: 2023-06-22
Payer: COMMERCIAL

## 2023-06-22 RX ORDER — FLUCONAZOLE 150 MG/1
150 TABLET ORAL ONCE AS NEEDED
Qty: 2 TABLET | Refills: 0 | Status: SHIPPED | OUTPATIENT
Start: 2023-06-22 | End: 2023-06-22

## 2023-06-22 RX ORDER — ROSUVASTATIN CALCIUM 40 MG/1
40 TABLET, COATED ORAL DAILY
Qty: 90 TABLET | Refills: 1 | Status: SHIPPED | OUTPATIENT
Start: 2023-06-22 | End: 2024-01-29

## 2023-06-22 NOTE — TELEPHONE ENCOUNTER
No care due was identified.  [facility] Embedded Care Due Messages. Reference number: [id number].   6/22/2023 8:02:36 AM CDT

## 2023-06-22 NOTE — TELEPHONE ENCOUNTER
----- Message from Kathleen Jacobson sent at 6/22/2023  8:16 AM CDT -----  Contact: Yarely  Type:  Needs Medical Advice    Who Called: Yarely  Symptoms (please be specific): Itching/Yeast infection from antibiotics/Recently lanced boil  How long has patient had these symptoms:  Couple of days  Pharmacy name and phone #:    [facility] - [place], [place] - [address]  [address]  [place] [address]  Phone: [phone] Fax: [phone]  Would the patient rather a call back or a response via [facility]? call  Best Call Back Number: [phone]  Additional Information: Patient reports having a boil recently lanced and having a yeast infection as a results of antibiotics. Patient request to receive a diflucan prescription.   Thank you,  GH

## 2023-06-22 NOTE — TELEPHONE ENCOUNTER
Refill Decision Note   Yarely Donna  is requesting a refill authorization.  Brief Assessment and Rationale for Refill:  Approve     Medication Therapy Plan:       Medication Reconciliation Completed: No    Comments:     No Care Gaps recommended.     Note composed:1:39 PM 06/22/2023

## 2023-06-23 ENCOUNTER — PATIENT MESSAGE (OUTPATIENT)
Dept: FAMILY MEDICINE | Facility: CLINIC | Age: 53
End: 2023-06-23
Payer: COMMERCIAL

## 2023-06-26 ENCOUNTER — OFFICE VISIT (OUTPATIENT)
Dept: DIABETES | Facility: CLINIC | Age: 53
End: 2023-06-26
Payer: COMMERCIAL

## 2023-06-26 DIAGNOSIS — I15.2 HYPERTENSION ASSOCIATED WITH DIABETES: ICD-10-CM

## 2023-06-26 DIAGNOSIS — E78.5 HYPERLIPIDEMIA ASSOCIATED WITH TYPE 2 DIABETES MELLITUS: ICD-10-CM

## 2023-06-26 DIAGNOSIS — E11.29 TYPE 2 DIABETES MELLITUS WITH MICROALBUMINURIA, WITH LONG-TERM CURRENT USE OF INSULIN: Primary | ICD-10-CM

## 2023-06-26 DIAGNOSIS — R80.9 TYPE 2 DIABETES MELLITUS WITH MICROALBUMINURIA, WITH LONG-TERM CURRENT USE OF INSULIN: Primary | ICD-10-CM

## 2023-06-26 DIAGNOSIS — E11.69 HYPERLIPIDEMIA ASSOCIATED WITH TYPE 2 DIABETES MELLITUS: ICD-10-CM

## 2023-06-26 DIAGNOSIS — Z79.4 TYPE 2 DIABETES MELLITUS WITH MICROALBUMINURIA, WITH LONG-TERM CURRENT USE OF INSULIN: Primary | ICD-10-CM

## 2023-06-26 DIAGNOSIS — E11.59 HYPERTENSION ASSOCIATED WITH DIABETES: ICD-10-CM

## 2023-06-26 PROCEDURE — 3066F PR DOCUMENTATION OF TREATMENT FOR NEPHROPATHY: ICD-10-PCS | Mod: CPTII,95,, | Performed by: NURSE PRACTITIONER

## 2023-06-26 PROCEDURE — 3062F PR POS MACROALBUMINURIA RESULT DOCUMENTED/REVIEW: ICD-10-PCS | Mod: CPTII,95,, | Performed by: NURSE PRACTITIONER

## 2023-06-26 PROCEDURE — 3046F HEMOGLOBIN A1C LEVEL >9.0%: CPT | Mod: CPTII,95,, | Performed by: NURSE PRACTITIONER

## 2023-06-26 PROCEDURE — 3062F POS MACROALBUMINURIA REV: CPT | Mod: CPTII,95,, | Performed by: NURSE PRACTITIONER

## 2023-06-26 PROCEDURE — 4010F ACE/ARB THERAPY RXD/TAKEN: CPT | Mod: CPTII,95,, | Performed by: NURSE PRACTITIONER

## 2023-06-26 PROCEDURE — 4010F PR ACE/ARB THEARPY RXD/TAKEN: ICD-10-PCS | Mod: CPTII,95,, | Performed by: NURSE PRACTITIONER

## 2023-06-26 PROCEDURE — 3046F PR MOST RECENT HEMOGLOBIN A1C LEVEL > 9.0%: ICD-10-PCS | Mod: CPTII,95,, | Performed by: NURSE PRACTITIONER

## 2023-06-26 PROCEDURE — 3066F NEPHROPATHY DOC TX: CPT | Mod: CPTII,95,, | Performed by: NURSE PRACTITIONER

## 2023-06-26 PROCEDURE — 99214 PR OFFICE/OUTPT VISIT, EST, LEVL IV, 30-39 MIN: ICD-10-PCS | Mod: 95,,, | Performed by: NURSE PRACTITIONER

## 2023-06-26 PROCEDURE — 99214 OFFICE O/P EST MOD 30 MIN: CPT | Mod: 95,,, | Performed by: NURSE PRACTITIONER

## 2023-06-26 RX ORDER — INSULIN GLARGINE 100 [IU]/ML
30 INJECTION, SOLUTION SUBCUTANEOUS NIGHTLY
Qty: 27 ML | Refills: 3 | Status: SHIPPED | OUTPATIENT
Start: 2023-06-26 | End: 2023-07-27

## 2023-06-26 RX ORDER — TIRZEPATIDE 5 MG/.5ML
5 INJECTION, SOLUTION SUBCUTANEOUS
Qty: 4 PEN | Refills: 0 | Status: SHIPPED | OUTPATIENT
Start: 2023-06-26 | End: 2023-06-27 | Stop reason: DRUGHIGH

## 2023-06-26 NOTE — PATIENT INSTRUCTIONS
PATIENT INSTRUCTIONS    Lifestyle modification with diabetic diet and at least 30 minutes of physical activity daily recommended.  Refer to diabetes education.    Diabetic Foot Exam deferred today due to virtual visit. Will plan to be done at next in-person visit.     Discontinue Trulicity.   Start Mounjaro 5 mg subcutaneously every 7 days.   Increase Lantus to 30 units subcutaneously daily.   Continue Novolog 20 units subcutaneously three times daily with meals plus correction dosing.  Take 10-15 minutes before meal.     <200 = +0u  201-250 = +2u  251-300=+4u  301-350=+6u  >350=+8u

## 2023-06-26 NOTE — PROGRESS NOTES
"Telemedicine Visit    The patient location is home  The chief complaint leading to consultation is: Diabetes    Visit type: audiovisual    Face to Face time with patient: 30  60 minutes of total time spent on the encounter, which includes face to face time and non-face to face time preparing to see the patient (eg, review of tests), Obtaining and/or reviewing separately obtained history, Documenting clinical information in the electronic or other health record, Independently interpreting results (not separately reported) and communicating results to the patient/family/caregiver, or Care coordination (not separately reported).  Each patient to whom he or she provides medical services by telemedicine is:  (1) informed of the relationship between the physician and patient and the respective role of any other health care provider with respect to management of the patient; and (2) notified that he or she may decline to receive medical services by telemedicine and may withdraw from such care at any time.  Notes:         Yarely Thompson is a 52 y.o. female who  has a past medical history of Anxiety, Cholelithiasis, Depression, Fatty liver, HTN (hypertension), Hyperlipidemia, Proteinuria, Severe obesity (BMI 35.0-35.9 with comorbidity) (3/16/2018), ADEN (stress urinary incontinence, female), Type 2 diabetes mellitus with hypertriglyceridemia (6/6/2022), Type 2 diabetes mellitus with microalbuminuria, with long-term current use of insulin (7/22/2016), and Vitamin D insufficiency., who present for an initial evaluation of Type 2 diabetes mellitus.     CHIEF COMPLAINT: Diabetes Consultation    PCP: Mikey Ayon MD     The patient was initially diagnosed with diabetes 8 years ago.     Previous failed treatments include:  Metformin - GI upset.    Social Documentation:  Patient lives in [place] with .   Occupation: Resident  at [employer].   Exercise: no formal exercise program.   "I do not " "watch what I eat"    Current monitoring regimen: Chris 14 Day [place].  Over 400 during visit: beans and rice, iced coffee with equal, honey bun.     Current Diabetes related symptoms/problems include hyperglycemia, polydipsia, polyuria, and visual disturbances and have been unchanged.     Diabetes related complications:   nephropathy.   denies Pancreatitis  denies Gastroparesis  denies DKA  denies Hx/family Hx of MEN2/MTC  reports Frequent UTIs/yeast infections    Cardiovascular Risk Factors: family history of premature cardiovascular disease, hypertension, microalbuminuria, obesity (BMI >30 kg/m2), and sedentary lifestyle.    Any episodes of hypoglycemia?  No.   Hypoglycemia Unawareness? No  Severe hypoglycemia requiring 3rd party? No    Seen by Diabetes Education in last year? no    Diabetes Medications               dulaglutide (TRULICITY) 3 mg/0.5 mL pen injector Inject 3 mg into the skin every 7 days. - TAKING EVERY FRIDAY    insulin (LANTUS SOLOSTAR U-100 INSULIN) glargine 100 units/mL SubQ pen Inject 20 Units into the skin every evening. May increase by 2 units every 3 days if fasting morning glucose over 130. Max 50 units - TAKING 28 UNITS EVER EVENING.    insulin aspart U-100 (NOVOLOG FLEXPEN U-100 INSULIN) 100 unit/mL (3 mL) InPn pen Inject 20 Units into the skin 3 (three) times daily before meals. +scale.<200=+0u, 201-250=+2u,251-300=+4u, 301-350=+6u, >350=+8u. Max 84 units/day - TAKING 28 UNITS BID AC.     DIABETES DISEASE MANAGEMENT STATUS  Statin: Taking  ACE/ARB: Taking  Screening or Prevention Patient's value Goal Complete/Controlled?   HgA1C Testing and Control   Lab Results   Component Value Date    HGBA1C 11.7 (H) 06/09/2023      Annually/Less than 8% No   Lipid profile : 06/09/2023 Annually Yes   LDL control Lab Results   Component Value Date    LDLCALC Invalid, Trig>400.0 06/09/2023    Annually/Less than 100 mg/dl  Yes   Nephropathy screening Lab Results   Component Value Date    LABMICR 2294.0 " 06/09/2023     No results found for: PROTEINUA  No results found for: UTPCR   Annually Yes   Blood pressure BP Readings from Last 1 Encounters:   12/22/22 113/67    Less than 140/90 Yes   Dilated retinal exam : 04/14/2023 Annually Yes   Foot exam   : 06/06/2022 Annually No   Patient's medications, allergies, past medical, surgical, social and family histories were reviewed and updated as appropriate.     Review of Systems   Constitutional:  Negative for weight loss.   Eyes:  Negative for blurred vision and double vision.   Cardiovascular:  Negative for chest pain.   Gastrointestinal:  Negative for nausea and vomiting.   Genitourinary:  Negative for frequency.   Musculoskeletal:  Negative for falls.   Neurological:  Negative for dizziness and weakness.   Endo/Heme/Allergies:  Negative for polydipsia.   Psychiatric/Behavioral:  Negative for depression.    All other systems reviewed and are negative.          Physical Exam  Constitutional:       Appearance: Normal appearance.   HENT:      Head: Normocephalic and atraumatic.   Pulmonary:      Effort: No respiratory distress.   Musculoskeletal:      Cervical back: Normal range of motion.   Neurological:      Mental Status: She is alert and oriented to person, place, and time.   Psychiatric:         Mood and Affect: Mood normal.         Behavior: Behavior normal.      There were no vitals taken for this visit.  Wt Readings from Last 3 Encounters:   06/14/23 81.2 kg (179 lb)   04/28/23 81.3 kg (179 lb 4.8 oz)   12/22/22 77.3 kg (170 lb 4.9 oz)       LAB REVIEW  Lab Results   Component Value Date     (L) 06/09/2023    K 4.2 06/09/2023     06/09/2023    CO2 24 06/09/2023    BUN 11 06/09/2023    CREATININE 0.8 06/09/2023    CALCIUM 9.6 06/09/2023    ANIONGAP 8 06/09/2023    EGFRNORACEVR >60.0 06/09/2023     Lab Results   Component Value Date    CPEPTIDE 4.45 12/22/2022     Hemoglobin A1C   Date Value Ref Range Status   06/09/2023 11.7 (H) 4.0 - 5.6 % Final      Comment:     ADA Screening Guidelines:  5.7-6.4%  Consistent with prediabetes  >or=6.5%  Consistent with diabetes    High levels of fetal hemoglobin interfere with the HbA1C  assay. Heterozygous hemoglobin variants (HbS, HgC, etc)do  not significantly interfere with this assay.   However, presence of multiple variants may affect accuracy.     12/09/2022 12.4 (H) 4.0 - 5.6 % Final     Comment:     ADA Screening Guidelines:  5.7-6.4%  Consistent with prediabetes  >or=6.5%  Consistent with diabetes    High levels of fetal hemoglobin interfere with the HbA1C  assay. Heterozygous hemoglobin variants (HbS, HgC, etc)do  not significantly interfere with this assay.   However, presence of multiple variants may affect accuracy.     05/30/2022 11.5 (H) 4.0 - 5.6 % Final     Comment:     ADA Screening Guidelines:  5.7-6.4%  Consistent with prediabetes  >or=6.5%  Consistent with diabetes    High levels of fetal hemoglobin interfere with the HbA1C  assay. Heterozygous hemoglobin variants (HbS, HgC, etc)do  not significantly interfere with this assay.   However, presence of multiple variants may affect accuracy.         ASSESSMENT    ICD-10-CM ICD-9-CM   1. Type 2 diabetes mellitus with microalbuminuria, with long-term current use of insulin  E11.29 250.40    R80.9 791.0    Z79.4 V58.67   2. Hyperlipidemia associated with type 2 diabetes mellitus  E11.69 250.80    E78.5 272.4   3. Hypertension associated with diabetes  E11.59 250.80    I15.2 401.9        PLAN  Diagnoses and all orders for this visit:    Type 2 diabetes mellitus with microalbuminuria, with long-term current use of insulin  -     tirzepatide (MOUNJARO) 5 mg/0.5 mL PnIj; Inject 5 mg into the skin every 7 days.  -     insulin (LANTUS SOLOSTAR U-100 INSULIN) glargine 100 units/mL SubQ pen; Inject 30 Units into the skin every evening.  -     Ambulatory referral/consult to Diabetes Education; Future    Hyperlipidemia associated with type 2 diabetes  mellitus    Hypertension associated with diabetes        Reviewed pathophysiology of diabetes, complications related to the disease, importance of annual dilated eye exam and daily foot examination. Explained MOA, SE, dosage of medications. Written instructions given and reviewed with patient and patient verbalizes understanding.     PATIENT INSTRUCTIONS    Lifestyle modification with diabetic diet and at least 30 minutes of physical activity daily recommended.  Refer to diabetes education.    Diabetic Foot Exam deferred today due to virtual visit. Will plan to be done at next in-person visit.     Discontinue Trulicity.   Start Mounjaro 5 mg subcutaneously every 7 days.   Increase Lantus to 30 units subcutaneously daily.   Continue Novolog 20 units subcutaneously three times daily with meals plus correction dosing.  Take 10-15 minutes before meal.     <200 = +0u  201-250 = +2u  251-300=+4u  301-350=+6u  >350=+8u    Follow up in about 4 weeks (around 7/24/2023) for VIRTUAL, ALEXANDRA SLOAN FOR CGM DL BEFORE VISIT, SCHEDULE DM EDUCATION.    Portions of this note were prepared with [employer] Naturally Speaking voice recognition transcription software. Grammatical errors, including garbled syntax, mangle pronouns, and other bizarre constructions may be attributed to that software system.

## 2023-06-27 ENCOUNTER — TELEPHONE (OUTPATIENT)
Dept: DIABETES | Facility: CLINIC | Age: 53
End: 2023-06-27
Payer: COMMERCIAL

## 2023-06-27 RX ORDER — TIRZEPATIDE 2.5 MG/.5ML
2.5 INJECTION, SOLUTION SUBCUTANEOUS
Qty: 4 PEN | Refills: 0 | Status: SHIPPED | OUTPATIENT
Start: 2023-06-27 | End: 2023-07-27 | Stop reason: DRUGHIGH

## 2023-06-27 NOTE — TELEPHONE ENCOUNTER
Called Vanna at the pharmacy to let her know we needed to send the lower dose of the Mounjaro for it to be covered by the insurance and that the Rx had been sent to [facility] [facility]     She voiced understanding

## 2023-06-27 NOTE — TELEPHONE ENCOUNTER
----- Message from Ashleigh Trimble sent at 6/27/2023  7:37 AM CDT -----  Regarding: pt advice  Contact: pt  Pt states that ins will not pay for the tirzepatide (MOUNJARO) 5 mg/0.5 mL PnIj. States that they will cover the 2.5mg. States that rx has to start with the lowest dose. Please call back at [phone]//thank you acc

## 2023-06-27 NOTE — TELEPHONE ENCOUNTER
----- Message from Chinedu Colon sent at 6/27/2023 11:00 AM CDT -----  Contact: [phone] or fax [phone]  Vanna Melendez would like to consult with a nurse in regards to the patient prescription tirzepatide (MOUNJARO) 2.5 mg/0.5 mL PnIj she stated the patient has to stay at that dosage for 1 month even if taking similar medication.

## 2023-06-27 NOTE — TELEPHONE ENCOUNTER
----- Message from Mishel Courtney RD, CDE sent at 6/27/2023 10:22 AM CDT -----  Contact: destiny Pritchett    ----- Message -----  From: Tesha Banks  Sent: 6/27/2023   8:49 AM CDT  To: , #    Pt is calling to speak with the nurse regarding rescheduling appts scheduled on 7/20 and 7/25 due to work. Please give pt a call back at .[phone]. Thanks DEANNA.

## 2023-07-06 ENCOUNTER — TELEPHONE (OUTPATIENT)
Dept: DIABETES | Facility: CLINIC | Age: 53
End: 2023-07-06
Payer: COMMERCIAL

## 2023-07-06 NOTE — TELEPHONE ENCOUNTER
Working the referral workque Patients referral order will be cancelled Patient appointment has been completed

## 2023-07-11 ENCOUNTER — TELEPHONE (OUTPATIENT)
Dept: SMOKING CESSATION | Facility: CLINIC | Age: 53
End: 2023-07-11
Payer: COMMERCIAL

## 2023-07-11 NOTE — TELEPHONE ENCOUNTER
Called # 3 x because someone kept hanging up and finally they answered stating that Ms. Thompson was not there. Before I could leave name or number, she hung up on me.

## 2023-07-24 ENCOUNTER — TELEPHONE (OUTPATIENT)
Dept: FAMILY MEDICINE | Facility: CLINIC | Age: 53
End: 2023-07-24
Payer: COMMERCIAL

## 2023-07-24 ENCOUNTER — PATIENT MESSAGE (OUTPATIENT)
Dept: FAMILY MEDICINE | Facility: CLINIC | Age: 53
End: 2023-07-24
Payer: COMMERCIAL

## 2023-07-24 NOTE — TELEPHONE ENCOUNTER
Left message on voice mail to return call, needs appt for klonopin, also Information sent via [employer]

## 2023-07-24 NOTE — TELEPHONE ENCOUNTER
----- Message from Heather Chang sent at 7/24/2023  8:22 AM CDT -----  Contact: Yarely dixon [phone]  Requesting an RX refill or new RX.  Is this a refill or new RX:   RX name and strength:   sertraline (ZOLOFT) 100 MG tablet  Is this a 30 day or 90 day RX:   Pharmacy name and phone # :  [facility] [place]  Phone: [phone]  The doctors have asked that we provide their patients with the following 2 reminders -- prescription refills can take up to 72 hours, and a friendly reminder that in the future you can use your [facility] account to request refills:      Requesting an RX refill or new RX.  Is this a refill or new RX: refill  RX name and strength:clonazePAM (KLONOPIN) 0.5 MG tablet  Is this a 30 day or 90 day RX:   Pharmacy name and phone # (copy/paste from chart):    The doctors have asked that we provide their patients with the following 2 reminders -- prescription refills can take up to 72 hours, and a friendly reminder that in the future you can use your [facility] account to request refills:      Requesting an RX refill or new RX.  Is this a refill or new RX: refill  RX name and strength:  clonazePAM (KLONOPIN) 0.5 MG tablet  Is this a 30 day or 90 day RX:   Pharmacy name and phone # (copy/paste from chart):    The doctors have asked that we provide their patients with the following 2 reminders -- prescription refills can take up to 72 hours, and a friendly reminder that in the future you can use your [facility] account to request refills:        Comments: She sees a different provider for these medications, but may have some changes to the dosages. She stated that she can get the pharmacy to fax over the current rx

## 2023-07-25 ENCOUNTER — CLINICAL SUPPORT (OUTPATIENT)
Dept: DIABETES | Facility: CLINIC | Age: 53
End: 2023-07-25
Payer: COMMERCIAL

## 2023-07-27 ENCOUNTER — OFFICE VISIT (OUTPATIENT)
Dept: DIABETES | Facility: CLINIC | Age: 53
End: 2023-07-27
Payer: COMMERCIAL

## 2023-07-27 DIAGNOSIS — R80.9 TYPE 2 DIABETES MELLITUS WITH MICROALBUMINURIA, WITH LONG-TERM CURRENT USE OF INSULIN: Primary | ICD-10-CM

## 2023-07-27 DIAGNOSIS — E11.29 TYPE 2 DIABETES MELLITUS WITH MICROALBUMINURIA, WITH LONG-TERM CURRENT USE OF INSULIN: Primary | ICD-10-CM

## 2023-07-27 DIAGNOSIS — Z79.4 TYPE 2 DIABETES MELLITUS WITH MICROALBUMINURIA, WITH LONG-TERM CURRENT USE OF INSULIN: Primary | ICD-10-CM

## 2023-07-27 PROCEDURE — 4010F PR ACE/ARB THEARPY RXD/TAKEN: ICD-10-PCS | Mod: CPTII,95,, | Performed by: NURSE PRACTITIONER

## 2023-07-27 PROCEDURE — 95251 CONT GLUC MNTR ANALYSIS I&R: CPT | Mod: NDTC,S$GLB,, | Performed by: NURSE PRACTITIONER

## 2023-07-27 PROCEDURE — 4010F ACE/ARB THERAPY RXD/TAKEN: CPT | Mod: CPTII,95,, | Performed by: NURSE PRACTITIONER

## 2023-07-27 PROCEDURE — 99214 PR OFFICE/OUTPT VISIT, EST, LEVL IV, 30-39 MIN: ICD-10-PCS | Mod: 95,,, | Performed by: NURSE PRACTITIONER

## 2023-07-27 PROCEDURE — 3062F POS MACROALBUMINURIA REV: CPT | Mod: CPTII,95,, | Performed by: NURSE PRACTITIONER

## 2023-07-27 PROCEDURE — 95251 PR GLUCOSE MONITOR, 72 HOUR, PHYS INTERP: ICD-10-PCS | Mod: NDTC,S$GLB,, | Performed by: NURSE PRACTITIONER

## 2023-07-27 PROCEDURE — 3046F PR MOST RECENT HEMOGLOBIN A1C LEVEL > 9.0%: ICD-10-PCS | Mod: CPTII,95,, | Performed by: NURSE PRACTITIONER

## 2023-07-27 PROCEDURE — 3066F PR DOCUMENTATION OF TREATMENT FOR NEPHROPATHY: ICD-10-PCS | Mod: CPTII,95,, | Performed by: NURSE PRACTITIONER

## 2023-07-27 PROCEDURE — 3066F NEPHROPATHY DOC TX: CPT | Mod: CPTII,95,, | Performed by: NURSE PRACTITIONER

## 2023-07-27 PROCEDURE — 99214 OFFICE O/P EST MOD 30 MIN: CPT | Mod: 95,,, | Performed by: NURSE PRACTITIONER

## 2023-07-27 PROCEDURE — 3046F HEMOGLOBIN A1C LEVEL >9.0%: CPT | Mod: CPTII,95,, | Performed by: NURSE PRACTITIONER

## 2023-07-27 PROCEDURE — 3062F PR POS MACROALBUMINURIA RESULT DOCUMENTED/REVIEW: ICD-10-PCS | Mod: CPTII,95,, | Performed by: NURSE PRACTITIONER

## 2023-07-27 RX ORDER — TIRZEPATIDE 5 MG/.5ML
5 INJECTION, SOLUTION SUBCUTANEOUS
Qty: 4 PEN | Refills: 0 | Status: SHIPPED | OUTPATIENT
Start: 2023-07-27 | End: 2023-08-24 | Stop reason: DRUGHIGH

## 2023-07-27 RX ORDER — INSULIN GLARGINE 100 [IU]/ML
36 INJECTION, SOLUTION SUBCUTANEOUS DAILY
Qty: 32.4 ML | Refills: 3 | Status: SHIPPED | OUTPATIENT
Start: 2023-07-27 | End: 2024-07-26

## 2023-07-27 NOTE — PATIENT INSTRUCTIONS
PATIENT INSTRUCTIONS     Lifestyle modification with diabetic diet and at least 30 minutes of physical activity daily recommended.  Diabetic Foot Exam deferred today due to virtual visit. Will plan to be done at next in-person visit.      Increase Mounjaro to 5 mg subcutaneously every 7 days.   Increase Lantus to 36 units subcutaneously daily.   Continue Novolog 20 units subcutaneously three times daily with meals plus correction dosing.  Take 10-15 minutes before meal.      <200 = +0u  201-250 = +2u  251-300=+4u  301-350=+6u  >350=+8u    Continue Chris CGM  Blood Sugar Goals:       Fastin-130.       1-2 hours after a meal: Less than 180.

## 2023-07-27 NOTE — PROGRESS NOTES
"The patient location is: Home  The chief complaint leading to consultation is: Diabetes    Visit type: audiovisual    Face to Face time with patient: 15  30 minutes of total time spent on the encounter, which includes face to face time and non-face to face time preparing to see the patient (eg, review of tests), Obtaining and/or reviewing separately obtained history, Documenting clinical information in the electronic or other health record, Independently interpreting results (not separately reported) and communicating results to the patient/family/caregiver, or Care coordination (not separately reported).  Each patient to whom he or she provides medical services by telemedicine is:  (1) informed of the relationship between the physician and patient and the respective role of any other health care provider with respect to management of the patient; and (2) notified that he or she may decline to receive medical services by telemedicine and may withdraw from such care at any time.    Notes:    Yarely Thompson is a 53 y.o. female who presents for a follow up evaluation of Type 2 diabetes mellitus.     CHIEF COMPLAINT: Diabetes Consultation    PCP: Mikey Ayon MD      Initial visit with me - 6/26/23    The patient was initially diagnosed with diabetes 8 years ago.      Previous failed treatments include:  Metformin - GI upset.     Social Documentation:  Patient lives in [place] with .   Occupation: Resident  at [employer].   Exercise: no formal exercise program.   "I do not watch what I eat"      Diabetes related complications:   nephropathy.   denies Pancreatitis  denies Gastroparesis  denies DKA  denies Hx/family Hx of MEN2/MTC  reports Frequent UTIs/yeast infections     Cardiovascular Risk Factors: family history of premature cardiovascular disease, hypertension, microalbuminuria, obesity (BMI >30 kg/m2), and sedentary lifestyle.     Diabetes Medications               insulin " (LANTUS SOLOSTAR U-100 INSULIN) glargine 100 units/mL SubQ pen Inject 30 Units into the skin every evening.    insulin aspart U-100 (NOVOLOG FLEXPEN U-100 INSULIN) 100 unit/mL (3 mL) InPn pen Inject 20 Units into the skin 3 (three) times daily before meals. +scale.<200=+0u, 201-250=+2u,251-300=+4u, 301-350=+6u, >350=+8u. Max 84 units/day    tirzepatide (MOUNJARO) 2.5 mg/0.5 mL PnIj Inject 2.5 mg into the skin every 7 days.     Current monitoring regimen:  Chris 14 Day CGM    The patient's Recognition PRO CGM was downloaded and reviewed. For the past 14 days, patient average glucose was 280 mg/dL. She was above range 89% of the time, in range 11% of the time, and below range 0% of the time. The target range for this patient was 70 - 180 mg/dL. Overall, there was a pattern of hyperglycemia.     Recent hypoglycemic episodes: no  Patient compliant with glucose checks and medication administration? Yes    DIABETES MANAGEMENT STATUS  Statin: Taking  ACE/ARB: Taking  Screening or Prevention Patient's value Goal Complete/Controlled?   HgA1C Testing and Control   Lab Results   Component Value Date    HGBA1C 11.7 (H) 06/09/2023      Annually/Less than 8% No   Lipid profile : 06/09/2023 Annually Yes   LDL control Lab Results   Component Value Date    LDLCALC Invalid, Trig>400.0 06/09/2023    Annually/Less than 100 mg/dl  Yes   Nephropathy screening Lab Results   Component Value Date    LABMICR 2294.0 06/09/2023     No results found for: PROTEINUA  No results found for: UTPCR   Annually Yes   Blood pressure BP Readings from Last 1 Encounters:   12/22/22 113/67    Less than 140/90 Yes   Dilated retinal exam : 04/14/2023 Annually Yes   Foot exam   : 06/06/2022 Annually No   Patient's medications, allergies, surgical, social and family histories were reviewed and updated as appropriate.     Review of Systems   Constitutional:  Negative for weight loss.   Eyes:  Negative for blurred vision and double vision.   Cardiovascular:  Negative  for chest pain.   Gastrointestinal:  Negative for nausea and vomiting.   Genitourinary:  Negative for frequency.   Musculoskeletal:  Negative for falls.   Neurological:  Negative for dizziness and weakness.   Endo/Heme/Allergies:  Negative for polydipsia.   Psychiatric/Behavioral:  Negative for depression.    All other systems reviewed and are negative.     Physical Exam  Constitutional:       Appearance: Normal appearance.   HENT:      Head: Normocephalic and atraumatic.   Pulmonary:      Effort: No respiratory distress.   Musculoskeletal:      Cervical back: Normal range of motion.   Neurological:      Mental Status: She is alert and oriented to person, place, and time.   Psychiatric:         Mood and Affect: Mood normal.         Behavior: Behavior normal.      There were no vitals taken for this visit.  Wt Readings from Last 3 Encounters:   06/14/23 81.2 kg (179 lb)   04/28/23 81.3 kg (179 lb 4.8 oz)   12/22/22 77.3 kg (170 lb 4.9 oz)       LAB REVIEW  Lab Results   Component Value Date     (L) 06/09/2023    K 4.2 06/09/2023     06/09/2023    CO2 24 06/09/2023    BUN 11 06/09/2023    CREATININE 0.8 06/09/2023    CALCIUM 9.6 06/09/2023    ANIONGAP 8 06/09/2023    EGFRNORACEVR >60.0 06/09/2023     Lab Results   Component Value Date    CPEPTIDE 4.45 12/22/2022     Hemoglobin A1C   Date Value Ref Range Status   06/09/2023 11.7 (H) 4.0 - 5.6 % Final     Comment:     ADA Screening Guidelines:  5.7-6.4%  Consistent with prediabetes  >or=6.5%  Consistent with diabetes    High levels of fetal hemoglobin interfere with the HbA1C  assay. Heterozygous hemoglobin variants (HbS, HgC, etc)do  not significantly interfere with this assay.   However, presence of multiple variants may affect accuracy.     12/09/2022 12.4 (H) 4.0 - 5.6 % Final     Comment:     ADA Screening Guidelines:  5.7-6.4%  Consistent with prediabetes  >or=6.5%  Consistent with diabetes    High levels of fetal hemoglobin interfere with the  HbA1C  assay. Heterozygous hemoglobin variants (HbS, HgC, etc)do  not significantly interfere with this assay.   However, presence of multiple variants may affect accuracy.     05/30/2022 11.5 (H) 4.0 - 5.6 % Final     Comment:     ADA Screening Guidelines:  5.7-6.4%  Consistent with prediabetes  >or=6.5%  Consistent with diabetes    High levels of fetal hemoglobin interfere with the HbA1C  assay. Heterozygous hemoglobin variants (HbS, HgC, etc)do  not significantly interfere with this assay.   However, presence of multiple variants may affect accuracy.          ASSESSMENT    ICD-10-CM ICD-9-CM   1. Type 2 diabetes mellitus with microalbuminuria, with long-term current use of insulin  E11.29 250.40    R80.9 791.0    Z79.4 V58.67       PLAN  Diagnoses and all orders for this visit:    Type 2 diabetes mellitus with microalbuminuria, with long-term current use of insulin  -     tirzepatide (MOUNJARO) 5 mg/0.5 mL PnIj; Inject 5 mg into the skin every 7 days.  -     insulin (LANTUS SOLOSTAR U-100 INSULIN) glargine 100 units/mL SubQ pen; Inject 36 Units into the skin once daily.        Reviewed pathophysiology of diabetes, complications related to the disease, importance of annual dilated eye exam and daily foot examination. Explained MOA, SE, dosage of medications. Written instructions given and reviewed with patient and patient verbalizes understanding.     PATIENT INSTRUCTIONS     Lifestyle modification with diabetic diet and at least 30 minutes of physical activity daily recommended.  Diabetic Foot Exam deferred today due to virtual visit. Will plan to be done at next in-person visit.      Increase Mounjaro to 5 mg subcutaneously every 7 days.   Increase Lantus to 36 units subcutaneously daily.   Continue Novolog 20 units subcutaneously three times daily with meals plus correction dosing.  Take 10-15 minutes before meal.      <200 = +0u  201-250 = +2u  251-300=+4u  301-350=+6u  >350=+8u    Continue Chris CGM  Blood  Sugar Goals:       Fastin-130.       1-2 hours after a meal: Less than 180.     Follow up in about 4 weeks (around 2023) for VIRGILIO SHETTY, [place] FOR CGM DL BEFORE VISIT.    Portions of this note were prepared with nvite Naturally Speaking voice recognition transcription software. Grammatical errors, including garbled syntax, mangle pronouns, and other bizarre constructions may be attributed to that software system.

## 2023-07-28 ENCOUNTER — TELEPHONE (OUTPATIENT)
Dept: DIABETES | Facility: CLINIC | Age: 53
End: 2023-07-28
Payer: COMMERCIAL

## 2023-08-03 ENCOUNTER — CLINICAL SUPPORT (OUTPATIENT)
Dept: DIABETES | Facility: CLINIC | Age: 53
End: 2023-08-03
Payer: COMMERCIAL

## 2023-08-03 DIAGNOSIS — E11.29 TYPE 2 DIABETES MELLITUS WITH MICROALBUMINURIA, WITH LONG-TERM CURRENT USE OF INSULIN: ICD-10-CM

## 2023-08-03 DIAGNOSIS — Z79.4 TYPE 2 DIABETES MELLITUS WITH MICROALBUMINURIA, WITH LONG-TERM CURRENT USE OF INSULIN: ICD-10-CM

## 2023-08-03 DIAGNOSIS — R80.9 TYPE 2 DIABETES MELLITUS WITH MICROALBUMINURIA, WITH LONG-TERM CURRENT USE OF INSULIN: ICD-10-CM

## 2023-08-03 PROCEDURE — 99999 PR PBB SHADOW E&M-EST. PATIENT-LVL II: CPT | Mod: PBBFAC,,, | Performed by: DIETITIAN, REGISTERED

## 2023-08-03 PROCEDURE — G0108 DIAB MANAGE TRN  PER INDIV: HCPCS | Mod: S$GLB,,, | Performed by: DIETITIAN, REGISTERED

## 2023-08-03 PROCEDURE — G0108 PR DIAB MANAGE TRN  PER INDIV: ICD-10-PCS | Mod: S$GLB,,, | Performed by: DIETITIAN, REGISTERED

## 2023-08-03 PROCEDURE — 99999 PR PBB SHADOW E&M-EST. PATIENT-LVL II: ICD-10-PCS | Mod: PBBFAC,,, | Performed by: DIETITIAN, REGISTERED

## 2023-08-03 NOTE — TELEPHONE ENCOUNTER
----- Message from Joanna Scherer sent at 9/24/2020  9:45 AM CDT -----  Regarding: Mammogram  Contact: pt  Type:  Mammogram    Caller is requesting to schedule their annual mammogram appointment.  Order is not listed in EPIC.  Please enter order and contact patient to schedule.  Name of Caller: pt   Where would they like the mammogram performed?[facility]  Would the patient rather a call back or a response via [facility]?  Call back  Best Call Back Number: [phone]  Additional Information:         
<-- Click to add NO pertinent Past Medical History

## 2023-08-04 ENCOUNTER — OFFICE VISIT (OUTPATIENT)
Dept: FAMILY MEDICINE | Facility: CLINIC | Age: 53
End: 2023-08-04
Payer: COMMERCIAL

## 2023-08-04 VITALS
WEIGHT: 172.63 LBS | TEMPERATURE: 98 F | BODY MASS INDEX: 28.76 KG/M2 | HEIGHT: 65 IN | HEART RATE: 68 BPM | SYSTOLIC BLOOD PRESSURE: 118 MMHG | DIASTOLIC BLOOD PRESSURE: 80 MMHG

## 2023-08-04 DIAGNOSIS — E11.29 TYPE 2 DIABETES MELLITUS WITH MICROALBUMINURIA, WITH LONG-TERM CURRENT USE OF INSULIN: ICD-10-CM

## 2023-08-04 DIAGNOSIS — E78.1 TYPE 2 DIABETES MELLITUS WITH HYPERTRIGLYCERIDEMIA: ICD-10-CM

## 2023-08-04 DIAGNOSIS — Z79.4 TYPE 2 DIABETES MELLITUS WITH MICROALBUMINURIA, WITH LONG-TERM CURRENT USE OF INSULIN: ICD-10-CM

## 2023-08-04 DIAGNOSIS — J06.9 UPPER RESPIRATORY TRACT INFECTION, UNSPECIFIED TYPE: ICD-10-CM

## 2023-08-04 DIAGNOSIS — E11.69 TYPE 2 DIABETES MELLITUS WITH HYPERTRIGLYCERIDEMIA: ICD-10-CM

## 2023-08-04 DIAGNOSIS — F51.01 PRIMARY INSOMNIA: ICD-10-CM

## 2023-08-04 DIAGNOSIS — R80.9 TYPE 2 DIABETES MELLITUS WITH MICROALBUMINURIA, WITH LONG-TERM CURRENT USE OF INSULIN: ICD-10-CM

## 2023-08-04 DIAGNOSIS — F17.200 SMOKING: ICD-10-CM

## 2023-08-04 DIAGNOSIS — F42.2 MIXED OBSESSIONAL THOUGHTS AND ACTS: ICD-10-CM

## 2023-08-04 DIAGNOSIS — F33.42 MAJOR DEPRESSIVE DISORDER, RECURRENT, IN FULL REMISSION: Primary | ICD-10-CM

## 2023-08-04 PROCEDURE — 3062F POS MACROALBUMINURIA REV: CPT | Mod: CPTII,S$GLB,, | Performed by: FAMILY MEDICINE

## 2023-08-04 PROCEDURE — 1159F PR MEDICATION LIST DOCUMENTED IN MEDICAL RECORD: ICD-10-PCS | Mod: CPTII,S$GLB,, | Performed by: FAMILY MEDICINE

## 2023-08-04 PROCEDURE — 3046F PR MOST RECENT HEMOGLOBIN A1C LEVEL > 9.0%: ICD-10-PCS | Mod: CPTII,S$GLB,, | Performed by: FAMILY MEDICINE

## 2023-08-04 PROCEDURE — 3079F DIAST BP 80-89 MM HG: CPT | Mod: CPTII,S$GLB,, | Performed by: FAMILY MEDICINE

## 2023-08-04 PROCEDURE — 3066F PR DOCUMENTATION OF TREATMENT FOR NEPHROPATHY: ICD-10-PCS | Mod: CPTII,S$GLB,, | Performed by: FAMILY MEDICINE

## 2023-08-04 PROCEDURE — 3079F PR MOST RECENT DIASTOLIC BLOOD PRESSURE 80-89 MM HG: ICD-10-PCS | Mod: CPTII,S$GLB,, | Performed by: FAMILY MEDICINE

## 2023-08-04 PROCEDURE — 4010F ACE/ARB THERAPY RXD/TAKEN: CPT | Mod: CPTII,S$GLB,, | Performed by: FAMILY MEDICINE

## 2023-08-04 PROCEDURE — 99214 OFFICE O/P EST MOD 30 MIN: CPT | Mod: S$GLB,,, | Performed by: FAMILY MEDICINE

## 2023-08-04 PROCEDURE — 3074F SYST BP LT 130 MM HG: CPT | Mod: CPTII,S$GLB,, | Performed by: FAMILY MEDICINE

## 2023-08-04 PROCEDURE — 99999 PR PBB SHADOW E&M-EST. PATIENT-LVL V: CPT | Mod: PBBFAC,,, | Performed by: FAMILY MEDICINE

## 2023-08-04 PROCEDURE — 3046F HEMOGLOBIN A1C LEVEL >9.0%: CPT | Mod: CPTII,S$GLB,, | Performed by: FAMILY MEDICINE

## 2023-08-04 PROCEDURE — 3008F PR BODY MASS INDEX (BMI) DOCUMENTED: ICD-10-PCS | Mod: CPTII,S$GLB,, | Performed by: FAMILY MEDICINE

## 2023-08-04 PROCEDURE — 4010F PR ACE/ARB THEARPY RXD/TAKEN: ICD-10-PCS | Mod: CPTII,S$GLB,, | Performed by: FAMILY MEDICINE

## 2023-08-04 PROCEDURE — 99999 PR PBB SHADOW E&M-EST. PATIENT-LVL V: ICD-10-PCS | Mod: PBBFAC,,, | Performed by: FAMILY MEDICINE

## 2023-08-04 PROCEDURE — 99214 PR OFFICE/OUTPT VISIT, EST, LEVL IV, 30-39 MIN: ICD-10-PCS | Mod: S$GLB,,, | Performed by: FAMILY MEDICINE

## 2023-08-04 PROCEDURE — 3066F NEPHROPATHY DOC TX: CPT | Mod: CPTII,S$GLB,, | Performed by: FAMILY MEDICINE

## 2023-08-04 PROCEDURE — 1159F MED LIST DOCD IN RCRD: CPT | Mod: CPTII,S$GLB,, | Performed by: FAMILY MEDICINE

## 2023-08-04 PROCEDURE — 3074F PR MOST RECENT SYSTOLIC BLOOD PRESSURE < 130 MM HG: ICD-10-PCS | Mod: CPTII,S$GLB,, | Performed by: FAMILY MEDICINE

## 2023-08-04 PROCEDURE — 3008F BODY MASS INDEX DOCD: CPT | Mod: CPTII,S$GLB,, | Performed by: FAMILY MEDICINE

## 2023-08-04 PROCEDURE — 3062F PR POS MACROALBUMINURIA RESULT DOCUMENTED/REVIEW: ICD-10-PCS | Mod: CPTII,S$GLB,, | Performed by: FAMILY MEDICINE

## 2023-08-04 RX ORDER — SERTRALINE HYDROCHLORIDE 100 MG/1
200 TABLET, FILM COATED ORAL DAILY
Qty: 60 TABLET | Refills: 0 | Status: SHIPPED | OUTPATIENT
Start: 2023-08-04

## 2023-08-04 RX ORDER — ARIPIPRAZOLE 5 MG/1
5 TABLET ORAL DAILY
Qty: 30 TABLET | Refills: 0 | Status: SHIPPED | OUTPATIENT
Start: 2023-08-04

## 2023-08-04 RX ORDER — CLONAZEPAM 0.5 MG/1
0.5 TABLET ORAL NIGHTLY
Qty: 30 TABLET | Refills: 0 | Status: SHIPPED | OUTPATIENT
Start: 2023-08-04

## 2023-08-04 NOTE — PROGRESS NOTES
Patient sees Psychiatry regarding depression, OCD and anxiety.  She is on chronic clonazepam, Zoloft, Abilify with good control.  States missed her last appointment and they were charging her 90 dollars to reschedule and she did have it.  She wanted me to refill the prescriptions.  She did start smoking again.  She is interested in smoking cessation program.  She does note some mild upper respiratory symptoms during the past 2 weeks with some mild rhinorrhea intermittent cough.  No fever.  No wheeze.  Symptoms seem to be improving.  She needs her foot check for her diabetes.  Diabetes has not been well controlled.  She is now seeing endocrine nurse practitioner try to get adjusted.      Yarely was seen today for medication refill.    Diagnoses and all orders for this visit:    Major depressive disorder, recurrent, in full remission    Mixed obsessional thoughts and acts    Primary insomnia    Smoking  -     Ambulatory referral/consult to Smoking Cessation Program; Future    Upper respiratory tract infection, unspecified type    Type 2 diabetes mellitus with hypertriglyceridemia    Type 2 diabetes mellitus with microalbuminuria, with long-term current use of insulin    Other orders  -     sertraline (ZOLOFT) 100 MG tablet; Take 2 tablets (200 mg total) by mouth once daily.  -     clonazePAM (KLONOPIN) 0.5 MG tablet; Take 1 tablet (0.5 mg total) by mouth every evening.  -     ARIPiprazole (ABILIFY) 5 MG Tab; Take 1 tablet (5 mg total) by mouth once daily.      Refilled above medications for 1 month.  She will get in with her psychiatrist for next refills.  Reviewed .  Try to quit smoking again.  Keep follow-up appointment with endocrine nurse practitioner.  Follow-up regarding respiratory symptoms if not continuing to improve over the next week.          Diabetes Management Status    Statin: Taking  ACE/ARB: Taking    Screening or Prevention Patient's value Goal Complete/Controlled?   HgA1C Testing and Control    "Lab Results   Component Value Date    HGBA1C 11.7 (H) 06/09/2023      Annually/Less than 8% No   Lipid profile : 06/09/2023 Annually Yes   LDL control Lab Results   Component Value Date    LDLCALC Invalid, Trig>400.0 06/09/2023    Annually/Less than 100 mg/dl  Yes   Nephropathy screening Lab Results   Component Value Date    LABMICR 2294.0 06/09/2023     No results found for: "PROTEINUA" Annually Yes   Blood pressure BP Readings from Last 1 Encounters:   08/04/23 118/80    Less than 140/90 Yes   Dilated retinal exam : 04/14/2023 Annually Yes   Foot exam   : 08/04/2023 Annually Yes       Past Medical History:  Past Medical History:   Diagnosis Date    Anxiety     Cholelithiasis     Depression     Fatty liver     HTN (hypertension)     Hyperlipidemia     Proteinuria     Dr. Sadler    Severe obesity (BMI 35.0-35.9 with comorbidity) 3/16/2018    ADEN (stress urinary incontinence, female)     Type 2 diabetes mellitus with hypertriglyceridemia 6/6/2022    Type 2 diabetes mellitus with microalbuminuria, with long-term current use of insulin 7/22/2016    Vitamin D insufficiency      History reviewed. No pertinent surgical history.  Review of patient's allergies indicates:   Allergen Reactions    Pcn [penicillins] Rash     Current Outpatient Medications on File Prior to Visit   Medication Sig Dispense Refill    blood-glucose meter kit Use as directed      fenofibrate (TRICOR) 48 MG tablet Take 1 tablet (48 mg total) by mouth once daily. 90 tablet 3    flash glucose scanning reader (FREESTYLE VIRGILIO 14 DAY READER) Misc Test glucose 3 times daily 1 each prn    flash glucose sensor (FREESTYLE VIRGILIO 14 DAY SENSOR) Kit USE TO TEST GLUCOSE THREE TIMES DAILY(CHANGE SENSOR EVERY 14 DAYS) 6 kit 3    insulin (LANTUS SOLOSTAR U-100 INSULIN) glargine 100 units/mL SubQ pen Inject 36 Units into the skin once daily. 32.4 mL 3    insulin aspart U-100 (NOVOLOG FLEXPEN U-100 INSULIN) 100 unit/mL (3 mL) InPn pen Inject 20 Units into the skin 3 " "(three) times daily before meals. +scale.<200=+0u, 201-250=+2u,251-300=+4u, 301-350=+6u, >350=+8u. Max 84 units/day 27 mL 5    lisinopriL (PRINIVIL,ZESTRIL) 20 MG tablet TAKE ONE TABLET BY MOUTH once DAILY 90 tablet 1    rosuvastatin (CRESTOR) 40 MG Tab Take 1 tablet (40 mg total) by mouth once daily. 90 tablet 1    spironolactone (ALDACTONE) 25 MG tablet Take 1 tablet (25 mg total) by mouth once daily. 30 tablet 11    SURE COMFORT PEN NEEDLE 31 gauge x 3/16" Ndle USE TO INJECT INSULINS FOUR TIMES DAILY 100 each 5    tirzepatide (MOUNJARO) 5 mg/0.5 mL PnIj Inject 5 mg into the skin every 7 days. 4 pen 0    [DISCONTINUED] aripiprazole (ABILIFY) 5 MG Tab Take 1 tablet (5 mg total) by mouth once daily. 30 tablet 0    [DISCONTINUED] clonazePAM (KLONOPIN) 0.5 MG tablet Take 0.5 mg by mouth 2 (two) times daily as needed.  2    [DISCONTINUED] sertraline (ZOLOFT) 100 MG tablet Take 200 mg by mouth once daily.  2     No current facility-administered medications on file prior to visit.     Social History     Socioeconomic History    Marital status:    Tobacco Use    Smoking status: Every Day     Current packs/day: 0.00     Average packs/day: 1.5 packs/day for 28.0 years (42.0 ttl pk-yrs)     Types: Cigarettes     Start date: 1994     Last attempt to quit: 2022     Years since quittin.9    Smokeless tobacco: Never   Substance and Sexual Activity    Alcohol use: No     Family History   Problem Relation Age of Onset    Hypertension Mother     Diabetes Mother     Lymphoma Father     Hypertension Father     Diabetes Father     Heart disease Brother 57    Breast cancer Maternal Aunt     Ovarian cancer Maternal Grandmother     Kidney disease Neg Hx     Glaucoma Neg Hx     Macular degeneration Neg Hx     Retinal detachment Neg Hx            ROS:  GENERAL: No fever, chills,  or significant weight changes.   CARDIOVASCULAR: Denies chest pain, PND, orthopnea or reduced exercise tolerance.  ABDOMEN: Appetite fine. " "Denies diarrhea, abdominal pain, hematemesis or blood in stool.  URINARY: No flank pain, dysuria or hematuria.      OBJECTIVE:   Vitals:    08/04/23 0735   BP: 118/80   Pulse: 68   Temp: 98 °F (36.7 °C)   TempSrc: Temporal   Weight: 78.3 kg (172 lb 9.6 oz)   Height: 5' 5" (1.651 m)     Wt Readings from Last 3 Encounters:   08/04/23 78.3 kg (172 lb 9.6 oz)   06/14/23 81.2 kg (179 lb)   04/28/23 81.3 kg (179 lb 4.8 oz)       APPEARANCE: Well nourished, well developed, in no acute distress.   HEAD: Normocephalic. Atraumatic. No sinus tenderness.   EYES:   Right eye: Pupil reactive. Conjunctiva clear.   Left eye: Pupil reactive. Conjunctiva clear. CHEST: Breath sounds clear bilaterally. Normal respiratory effort   CARDIOVASCULAR: Normal rate. Regular rhythm. No murmurs. No rub. No gallops.   PERIPHERAL VASCULAR: No cyanosis. No clubbing. No edema.   NEUROLOGIC: No focal findings.    Feet:Sensation in the feet intact to monofilament testing.   No significant foot lesions.  She does have some onychomycosis changes..Pulses palpable.  MENTAL STATUS: Alert. Oriented x 3.           "

## 2023-08-05 DIAGNOSIS — R80.9 TYPE 2 DIABETES MELLITUS WITH MICROALBUMINURIA, WITH LONG-TERM CURRENT USE OF INSULIN: ICD-10-CM

## 2023-08-05 DIAGNOSIS — E11.29 TYPE 2 DIABETES MELLITUS WITH MICROALBUMINURIA, WITH LONG-TERM CURRENT USE OF INSULIN: ICD-10-CM

## 2023-08-05 DIAGNOSIS — Z79.4 TYPE 2 DIABETES MELLITUS WITH MICROALBUMINURIA, WITH LONG-TERM CURRENT USE OF INSULIN: ICD-10-CM

## 2023-08-07 RX ORDER — TIRZEPATIDE 5 MG/.5ML
INJECTION, SOLUTION SUBCUTANEOUS
OUTPATIENT
Start: 2023-08-07

## 2023-08-08 VITALS — WEIGHT: 171.38 LBS | HEIGHT: 65 IN | BODY MASS INDEX: 28.55 KG/M2

## 2023-08-08 NOTE — PROGRESS NOTES
"Diabetes Care Specialist Progress Note  Author: Carmina De Los Santos RD  Date: 8/8/2023    Program Intake  Reason for Diabetes Program Visit:: Initial Diabetes Assessment  Current diabetes risk level:: moderate  In the last 12 months, have you:: used emergency room services (abscess)  Was the ER or hospital admission related to diabetes?: No  Permission to speak with others about care:: no    Lab Results   Component Value Date    HGBA1C 11.7 (H) 06/09/2023       Clinical  Problem Review  Reviewed Problem List with Patient: yes  Active comorbidities affecting diabetes self-care.: yes  Comorbidities: Hypertension, Other (comment) (hyperlipidemia, fatty liver)  Reviewed health maintenance: no (see comments)    Clinical Assessment  Current Diabetes Treatment: Insulin, Injectable (Mounjaro 5 mg, Lantus 36 units mornings, Novolog 20 units +2 201-250, 251-300 +4, 301-350 +6, >350 +8)  Have you ever experienced hypoglycemia (low blood sugar)?: yes  In the last month, how often have you experienced low blood sugar?: other (see comments) (not recently)  Are you able to tell when your blood sugar is low?: Yes  What symptoms do you experience?: Shaky, Sweaty, Tiredness  Have you ever been hospitalized because your blood sugar was too low?: no  How do you treat hypoglycemia (low blood sugar)?: other ("eat something")  Have you ever experienced hyperglycemia (high blood sugar)?: yes  In the last month, how often have you experienced high blood sugar?:  (Does not present with Agrar33 14 day reader)    Medication Information  How do you obtain your medications?: Patient drives  How many days a week do you miss your medications?: 3 or more (Skipping some Novolog doses)  Do you sometimes have difficulty refilling your medications?: No  Medication adherence impacting ability to self-manage diabetes?: Yes    Labs  Do you have regular lab work to monitor your medications?: Yes  Type of Regular Lab Work: A1c  Where do you get your labs " drawn?: [facility]  Lab Compliance Barriers: No    Nutritional Status  Diet: Regular  Meal Plan 24 Hour Recall: Breakfast, Lunch, Snack, Dinner (Drinks diet drinks, iced coffee, and 5 water bottles daily.  Eats large quantities of candy after lunch and dinner.)  Meal Plan 24 Hour Recall - Breakfast: [employer] Ice coffee (46 g sugar)  Meal Plan 24 Hour Recall - Lunch: Eat what is prepared at nursing home. Today was spaghetti, meatsauce, and vegetable  Meal Plan 24 Hour Recall - Dinner: sandwich, meal in slow cooker  Meal Plan 24 Hour Recall - Snack: individual bag of chips or pig skins, after lunch will eat 5 pack of mini candy bars  Change in appetite?: No  Dentation:: Intact  Recent Changes in Weight: Weight Loss (-8 lbs since 6/14)  Current nutritional status an area of need that is impacting patient's ability to self-manage diabetes?: No    Additional Social History    Support  Does anyone support you with your diabetes care?: yes  Who supports you?: self  Who takes you to your medical appointments?: self  Does the current support meet the patient's needs?: Yes  Is Support an area impacting ability to self-manage diabetes?: No    Access to Mass Media & Technology  Does the patient have access to any of the following devices or technologies?: Internet Access  Media or technology needs impacting ability to self-manage diabetes?: No    Cognitive/Behavioral Health  Alert and Oriented: Yes  Difficulty Thinking: No  Requires Prompting: No  Requires assistance for routine expression?: No  Cognitive or behavioral barriers impacting ability to self-manage diabetes?: No    Culture/Druze  Culture or Rastafari beliefs that may impact ability to access healthcare: No    Communication  Language preference: English  Hearing Problems: No  Vision Problems: No  Communication needs impacting ability to self-manage diabetes?: No    Health Literacy  Preferred Learning Method: Face to Face  How often do you need to have someone help  you read instructions, pamphlets, or written material from your doctor or pharmacy?: Never  Health literacy needs impacting ability to self-manage diabetes?: No      Diabetes Self-Management Skills Assessment    Diabetes Disease Process/Treatment Options  Patient/caregiver able to state what happens when someone has diabetes.: yes  Patient/caregiver knows what type of diabetes they have.: yes  Diabetes Type : Type II  Patient/caregiver able to identify at least three signs and symptoms of diabetes.: no  Patient able to identify at least three risk factors for diabetes.: yes  Identified risk factors:: family history, being overweight, age over 40  Diabetes Disease Process/Treatment Options: Skills Assessment Completed: Yes  Assessment indicates:: Adequate understanding  Area of need?: No    Nutrition/Healthy Eating  Challenges to healthy eating:: snacking between meals and at night, portion control, lack of will power  Method of carbohydrate measurement:: no method  Patient can identify foods that impact blood sugar.: yes  Patient-identified foods:: soda, sweets, starchy vegetables (corn, peas, beans)  Nutrition/Healthy Eating Skills Assessment Completed:: Yes  Assessment indicates:: Knowledge deficit, Instruction Needed  Area of need?: Yes    Physical Activity/Exercise  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)  Patient formally exercises outside of work.: no  Patient can identify forms of physical activity.: yes  Stated forms of physical activity:: any movement performed by muscles that uses energy, recreational activities, manual activity at work, housework  Patient can identify reasons why exercise/physical activity is important in diabetes management.: no  Physical Activity/Exercise Skills Assessment Completed: : Yes  Assessment indicates:: Adequate understanding  Area of need?: No    Medications  Patient is able to describe current diabetes management routine.:  yes  Diabetes management routine:: injectable medications, insulin (Mounjaro, Lantus in mornings, Novolog)  Patient is able to identify current diabetes medications, dosages, and appropriate timing of medications.: no  Patient understands the purpose of the medications taken for diabetes.: yes  Patient reports problems or concerns with current medication regimen.: no  Medication Skills Assessment Completed:: Yes  Assessment indicates:: Instruction Needed  Area of need?: Yes    Home Blood Glucose Monitoring  Patient states that blood sugar is checked at home daily.: yes  Monitoring Method:: personal continuous glucose monitor  Personal CGM type:: Chris 14 day with   Patient is able to use personal CGM appropriately.: no (Leaving  at home)  CGM Report reviewed?: no  Unable to download personal CGM: Patient left  at home.  Home Blood Glucose Monitoring Skills Assessment Completed: : Yes  Assessment indicates:: Adequate understanding, Other (comment) (Instructed patient  needs to be taken with her at all times.)  Area of need?: No    Acute Complications  Patient is able to identify types of acute complications: Yes  Patient Identified:: Hypoglycemia  Patient is able to state the basic meaning of hypoglycemia?: Yes  Able to state the blood sugar range for hypoglycemia?: no (see comments)  Patient can identify general symptoms of hypoglycemia: yes  Patient identified:: shakiness, sweating, fatigue  Able to state proper treatment of hypoglycemia?: no (see comments)  Acute Complications Skills Assessment Completed: : Yes  Assessment indicates:: Adequate understanding, Other (comment) (Taught rule of 15/15. No recent hypoglycemia reported.)  Area of need?: No    Chronic Complications  Chronic Complications Skills Assessment Completed: : No  Deferred due to:: Time    Psychosocial/Coping  Psychosocial/Coping Skills Assessment Completed: : No  Deffered due to:: Time    Assessment Summary and  Plan    Based on today's diabetes care assessment, the following areas of need were identified:      Social 8/3/2023   Support No   Access to Mass Media/Tech No   Cognitive/Behavioral Health No   Culture/Denominational No   Communication No   Health Literacy No        Clinical 8/3/2023   Medication Adherence Yes   Lab Compliance No   Nutritional Status No        Diabetes Self-Management Skills 8/3/2023   Diabetes Disease Process/Treatment Options No   Nutrition/Healthy Eating Yes, see care plan   Physical Activity/Exercise No   Medication Yes, see care plan   Home Blood Glucose Monitoring No   Acute Complications No          Today's interventions were provided through individual discussion, instruction, and written materials were provided.      Patient verbalized understanding of instruction and written materials.  Pt was able to return back demonstration of instructions today. Patient understood key points, needs reinforcement and further instruction.     Diabetes Self-Management Care Plan:    Today's Diabetes Self-Management Care Plan was developed with Yarely's input. Yarely has agreed to work toward the following goal(s) to improve his/her overall diabetes control.      Care Plan: Diabetes Management   Updates made since 7/9/2023 12:00 AM        Problem: Medications         Goal: Patient Agrees to take 36 units of Lantus (mornings), 20 units of Novolog + correct above 200 mg/dl with ISF of 25 before meals, Mounjaro 5 mg (Fridays)    Start Date: 8/3/2023   Expected End Date: 9/28/2023   Priority: High   Barriers: No Barriers Identified   Note:    Take Novolog before meals.  Take Novolog with you.  Patient reports she brings to work in a cooler.       Task: Reviewed with patient all current diabetes medications and provided basic review of the purpose, dosage, frequency, side effects, and storage of both oral and injectable diabetes medications. Completed 8/8/2023        Task: Reviewed possible resources for acquiring  "cost prohibitive medication.         Task: Instructed patient on how to self-administer         Task: Discussed guidelines for preventing, detecting and treating hypoglycemia and hyperglycemia and reviewed the importance of meal and medication timing with diabetes mediations for prevention of hypoglycemia and maximum drug benefit. Completed 8/8/2023        Problem: Healthy Eating         Goal: Follow Plate Method.  Decrease candy intake to 2 Reeses cups after dinner and one after lunch. Change salty snack midmorning to raw vegetables and ranch.    Start Date: 8/3/2023   Priority: High   Barriers: Lack of Motivation to Change   Note:    Explained patient can skip side dish at lunch and have additional Henrik cup.  Added Henrik cup to the list of carbohydrate servings in diabetes management booklet.  Patient ate candy daily after school with her father so this is part of a memory and dietary habit.  Patient more open to moderation.  Drinks diet drinks and water.  She does not "care for fruit."         Task: Reviewed the sources and role of Carbohydrate, Protein, and Fat and how each nutrient impacts blood sugar. Completed 8/8/2023        Task: Provided visual examples using dry measuring cups, food models, and other familiar objects such as computer mouse, deck or cards, tennis ball etc. to help with visualization of portions. Completed 8/8/2023        Task: Explained how to count carbohydrates using the food label and the use of dry measuring cups for accurate carb counting.         Task: Discussed strategies for choosing healthier menu options when dining out.         Task: Recommended replacing beverages containing high sugar content with noncaloric/sugar free options and/or water.         Task: Review the importance of balancing carbohydrates with each meal using portion control techniques to count servings of carbohydrate and label reading to identify serving size and amount of total carbs per serving.     "     Task: Provided Sample plate method and reviewed the use of the plate to estimate amounts of carbohydrate per meal. Completed 8/8/2023          Follow Up Plan     Follow up in about 2 months (around 10/3/2023).  Patient referred for poor dietary habits.  She reports she is aware of the role diet plays in diabetes management.  Focused on moderation and not blame.  She reports she is open to moderation.  Taught Plate Method.  Instructed patient on how to take medications correctly.  Left Chris reader at home so no glucose readings available.  Explained importance of having Chris reader with her.      Today's care plan and follow up schedule was discussed with patient.  Yarely verbalized understanding of the care plan, goals, and agrees to follow up plan.        The patient was encouraged to communicate with his/her health care provider/physician and care team regarding his/her condition(s) and treatment.  I provided the patient with my contact information today and encouraged to contact me via phone or [facility] Patient Portal as needed.     Length of Visit   Total Time: 60 Minutes

## 2023-08-10 ENCOUNTER — TELEPHONE (OUTPATIENT)
Dept: FAMILY MEDICINE | Facility: CLINIC | Age: 53
End: 2023-08-10
Payer: COMMERCIAL

## 2023-08-10 ENCOUNTER — PATIENT MESSAGE (OUTPATIENT)
Dept: FAMILY MEDICINE | Facility: CLINIC | Age: 53
End: 2023-08-10
Payer: COMMERCIAL

## 2023-08-10 RX ORDER — DOXYCYCLINE 100 MG/1
100 CAPSULE ORAL 2 TIMES DAILY
Qty: 14 CAPSULE | Refills: 0 | Status: SHIPPED | OUTPATIENT
Start: 2023-08-10 | End: 2023-08-17

## 2023-08-10 RX ORDER — BENZONATATE 200 MG/1
200 CAPSULE ORAL 3 TIMES DAILY PRN
Qty: 30 CAPSULE | Refills: 0 | Status: SHIPPED | OUTPATIENT
Start: 2023-08-10 | End: 2023-08-20

## 2023-08-10 NOTE — TELEPHONE ENCOUNTER
----- Message from Perri Cadet sent at 8/10/2023 10:27 AM CDT -----  Contact: Yarely  Patient is calling to speak with the nurse regarding medication for coughing. Explains the coughing is not getting any better. Please give a call back at .[phone] as requested.  Thanks  [facility] - [place] - [address]  [address]  [place] [address]  Phone: [phone] Fax: [phone]

## 2023-08-21 ENCOUNTER — TELEPHONE (OUTPATIENT)
Dept: DIABETES | Facility: CLINIC | Age: 53
End: 2023-08-21
Payer: COMMERCIAL

## 2023-08-22 ENCOUNTER — TELEPHONE (OUTPATIENT)
Dept: DIABETES | Facility: CLINIC | Age: 53
End: 2023-08-22
Payer: COMMERCIAL

## 2023-08-22 ENCOUNTER — CLINICAL SUPPORT (OUTPATIENT)
Dept: DIABETES | Facility: CLINIC | Age: 53
End: 2023-08-22
Payer: COMMERCIAL

## 2023-08-22 DIAGNOSIS — E11.9 TYPE 2 DIABETES MELLITUS WITHOUT COMPLICATION, UNSPECIFIED WHETHER LONG TERM INSULIN USE: Primary | ICD-10-CM

## 2023-08-22 NOTE — TELEPHONE ENCOUNTER
Pt was able to attend nurse visit to download prior to appointment with provider.  ----- Message from Joanna Garcia sent at 8/22/2023 10:42 AM CDT -----  Regarding: Patient Returning Call  Contact: Yarely  Type:  Patient Returning Call    Who Called: Yarely  Who Left Message for Patient: Amanda Berry MA     Does the patient know what this is regarding?:  Would the patient rather a call back or a response via [facility]? call  Best Call Back Number: [phone] (home)    Additional Information: Yarely is returning a call today and would like to speak to you today please.

## 2023-08-22 NOTE — TELEPHONE ENCOUNTER
Attempted to call pt back in regards to >>>>>>>>>>>      States she is calling regarding an email, she is suppose to receive. States she has not received it and she does not know what it is about. Please call pt [phone]. Thank you

## 2023-08-22 NOTE — PROGRESS NOTES
Patient in [facility] to get kaitlynn  downloaded. Downloaded  and returned it to pt. Pt is not currently wearing a sensor. She said she cant afford yo get one until Friday.

## 2023-08-22 NOTE — TELEPHONE ENCOUNTER
----- Message from Lauren Bhagat sent at 8/22/2023  9:58 AM CDT -----  States she is calling regarding an email, she is suppose to receive. States she has not received it and she does not know what it is about. Please call pt [phone]. Thank you

## 2023-08-23 ENCOUNTER — PATIENT MESSAGE (OUTPATIENT)
Dept: FAMILY MEDICINE | Facility: CLINIC | Age: 53
End: 2023-08-23
Payer: COMMERCIAL

## 2023-08-24 ENCOUNTER — OFFICE VISIT (OUTPATIENT)
Dept: DIABETES | Facility: CLINIC | Age: 53
End: 2023-08-24
Payer: COMMERCIAL

## 2023-08-24 DIAGNOSIS — Z79.4 TYPE 2 DIABETES MELLITUS WITH MICROALBUMINURIA, WITH LONG-TERM CURRENT USE OF INSULIN: Primary | ICD-10-CM

## 2023-08-24 DIAGNOSIS — R80.9 TYPE 2 DIABETES MELLITUS WITH MICROALBUMINURIA, WITH LONG-TERM CURRENT USE OF INSULIN: Primary | ICD-10-CM

## 2023-08-24 DIAGNOSIS — E11.29 TYPE 2 DIABETES MELLITUS WITH MICROALBUMINURIA, WITH LONG-TERM CURRENT USE OF INSULIN: Primary | ICD-10-CM

## 2023-08-24 PROCEDURE — 4010F PR ACE/ARB THEARPY RXD/TAKEN: ICD-10-PCS | Mod: CPTII,95,, | Performed by: NURSE PRACTITIONER

## 2023-08-24 PROCEDURE — 95251 CONT GLUC MNTR ANALYSIS I&R: CPT | Mod: NDTC,S$GLB,, | Performed by: NURSE PRACTITIONER

## 2023-08-24 PROCEDURE — 3046F PR MOST RECENT HEMOGLOBIN A1C LEVEL > 9.0%: ICD-10-PCS | Mod: CPTII,95,, | Performed by: NURSE PRACTITIONER

## 2023-08-24 PROCEDURE — 3046F HEMOGLOBIN A1C LEVEL >9.0%: CPT | Mod: CPTII,95,, | Performed by: NURSE PRACTITIONER

## 2023-08-24 PROCEDURE — 99214 OFFICE O/P EST MOD 30 MIN: CPT | Mod: 95,,, | Performed by: NURSE PRACTITIONER

## 2023-08-24 PROCEDURE — 3066F NEPHROPATHY DOC TX: CPT | Mod: CPTII,95,, | Performed by: NURSE PRACTITIONER

## 2023-08-24 PROCEDURE — 3062F PR POS MACROALBUMINURIA RESULT DOCUMENTED/REVIEW: ICD-10-PCS | Mod: CPTII,95,, | Performed by: NURSE PRACTITIONER

## 2023-08-24 PROCEDURE — 4010F ACE/ARB THERAPY RXD/TAKEN: CPT | Mod: CPTII,95,, | Performed by: NURSE PRACTITIONER

## 2023-08-24 PROCEDURE — 99214 PR OFFICE/OUTPT VISIT, EST, LEVL IV, 30-39 MIN: ICD-10-PCS | Mod: 95,,, | Performed by: NURSE PRACTITIONER

## 2023-08-24 PROCEDURE — 3062F POS MACROALBUMINURIA REV: CPT | Mod: CPTII,95,, | Performed by: NURSE PRACTITIONER

## 2023-08-24 PROCEDURE — 95251 PR GLUCOSE MONITOR, 72 HOUR, PHYS INTERP: ICD-10-PCS | Mod: NDTC,S$GLB,, | Performed by: NURSE PRACTITIONER

## 2023-08-24 PROCEDURE — 3066F PR DOCUMENTATION OF TREATMENT FOR NEPHROPATHY: ICD-10-PCS | Mod: CPTII,95,, | Performed by: NURSE PRACTITIONER

## 2023-08-24 NOTE — PROGRESS NOTES
"The patient location is: Home  The chief complaint leading to consultation is: Diabetes    Visit type: audiovisual    Face to Face time with patient: 15  30 minutes of total time spent on the encounter, which includes face to face time and non-face to face time preparing to see the patient (eg, review of tests), Obtaining and/or reviewing separately obtained history, Documenting clinical information in the electronic or other health record, Independently interpreting results (not separately reported) and communicating results to the patient/family/caregiver, or Care coordination (not separately reported).  Each patient to whom he or she provides medical services by telemedicine is:  (1) informed of the relationship between the physician and patient and the respective role of any other health care provider with respect to management of the patient; and (2) notified that he or she may decline to receive medical services by telemedicine and may withdraw from such care at any time.    Notes:    Yarely Thompson is a 53 y.o. female who presents for a follow up evaluation of Type 2 diabetes mellitus.     CHIEF COMPLAINT: Diabetes Consultation    PCP: Mikey Ayon MD      Initial visit with me - 6/26/23    The patient was initially diagnosed with diabetes 8 years ago.      Previous failed treatments include:  Metformin - GI upset.     Social Documentation:  Patient lives in [place] with .   Occupation: Resident  at [employer].   Exercise: no formal exercise program.   "I do not watch what I eat"      Diabetes related complications:   nephropathy.   denies Pancreatitis  denies Gastroparesis  denies DKA  denies Hx/family Hx of MEN2/MTC  reports Frequent UTIs/yeast infections     Cardiovascular Risk Factors: family history of premature cardiovascular disease, hypertension, microalbuminuria, obesity (BMI >30 kg/m2), and sedentary lifestyle.     Diabetes Medications               insulin " "(LANTUS SOLOSTAR U-100 INSULIN) glargine 100 units/mL SubQ pen Inject 30 Units into the skin every evening.    insulin aspart U-100 (NOVOLOG FLEXPEN U-100 INSULIN) 100 unit/mL (3 mL) InPn pen Inject 20 Units into the skin 3 (three) times daily before meals. +scale.<200=+0u, 201-250=+2u,251-300=+4u, 301-350=+6u, >350=+8u. Max 84 units/day    tirzepatide (MOUNJARO) 5 mg/0.5 mL PnIj Inject 5 mg into the skin every 7 days.     Current monitoring regimen:  Chris 14 Day CGM      The patient's [employer] CGM was downloaded and reviewed. For the past 14 days, patient average glucose was 269 mg/dL. She was above range 84% of the time, in range 16% of the time, and below range 0% of the time. The target range for this patient was 70 - 180 mg/dL. Overall, there was a pattern of post prandial hyperglylcemia. Hyperglycemia up to 500 after lunch. 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      Recent hypoglycemic episodes: no  Patient compliant with glucose checks and medication administration? Yes    DIABETES MANAGEMENT STATUS  Statin: Taking  ACE/ARB: Taking  Screening or Prevention Patient's value Goal Complete/Controlled?   HgA1C Testing and Control   Lab Results   Component Value Date    HGBA1C 11.7 (H) 06/09/2023      Annually/Less than 8% No   Lipid profile : 06/09/2023 Annually Yes   LDL control Lab Results   Component Value Date    LDLCALC Invalid, Trig>400.0 06/09/2023    Annually/Less than 100 mg/dl  Yes   Nephropathy screening Lab Results   Component Value Date    LABMICR 2294.0 06/09/2023     No results found for: "PROTEINUA"  No results found for: "UTPCR"   Annually Yes   Blood pressure BP Readings from Last 1 Encounters:   08/04/23 118/80    Less than 140/90 Yes   Dilated retinal exam : 04/14/2023 Annually Yes   Foot exam   : 08/04/2023 Annually No   Patient's medications, allergies, surgical, social and family histories were reviewed and updated " as appropriate.     Review of Systems   Constitutional:  Negative for weight loss.   Eyes:  Negative for blurred vision and double vision.   Cardiovascular:  Negative for chest pain.   Gastrointestinal:  Negative for nausea and vomiting.   Genitourinary:  Negative for frequency.   Musculoskeletal:  Negative for falls.   Neurological:  Negative for dizziness and weakness.   Endo/Heme/Allergies:  Negative for polydipsia.   Psychiatric/Behavioral:  Negative for depression.    All other systems reviewed and are negative.       Physical Exam  Constitutional:       Appearance: Normal appearance.   HENT:      Head: Normocephalic and atraumatic.   Pulmonary:      Effort: No respiratory distress.   Musculoskeletal:      Cervical back: Normal range of motion.   Neurological:      Mental Status: She is alert and oriented to person, place, and time.   Psychiatric:         Mood and Affect: Mood normal.         Behavior: Behavior normal.        There were no vitals taken for this visit.  Wt Readings from Last 3 Encounters:   08/04/23 78.3 kg (172 lb 9.6 oz)   08/08/23 77.7 kg (171 lb 6.4 oz)   06/14/23 81.2 kg (179 lb)       LAB REVIEW  Lab Results   Component Value Date     (L) 06/09/2023    K 4.2 06/09/2023     06/09/2023    CO2 24 06/09/2023    BUN 11 06/09/2023    CREATININE 0.8 06/09/2023    CALCIUM 9.6 06/09/2023    ANIONGAP 8 06/09/2023    EGFRNORACEVR >60.0 06/09/2023     Lab Results   Component Value Date    CPEPTIDE 4.45 12/22/2022     Hemoglobin A1C   Date Value Ref Range Status   06/09/2023 11.7 (H) 4.0 - 5.6 % Final     Comment:     ADA Screening Guidelines:  5.7-6.4%  Consistent with prediabetes  >or=6.5%  Consistent with diabetes    High levels of fetal hemoglobin interfere with the HbA1C  assay. Heterozygous hemoglobin variants (HbS, HgC, etc)do  not significantly interfere with this assay.   However, presence of multiple variants may affect accuracy.     12/09/2022 12.4 (H) 4.0 - 5.6 % Final      Comment:     ADA Screening Guidelines:  5.7-6.4%  Consistent with prediabetes  >or=6.5%  Consistent with diabetes    High levels of fetal hemoglobin interfere with the HbA1C  assay. Heterozygous hemoglobin variants (HbS, HgC, etc)do  not significantly interfere with this assay.   However, presence of multiple variants may affect accuracy.     05/30/2022 11.5 (H) 4.0 - 5.6 % Final     Comment:     ADA Screening Guidelines:  5.7-6.4%  Consistent with prediabetes  >or=6.5%  Consistent with diabetes    High levels of fetal hemoglobin interfere with the HbA1C  assay. Heterozygous hemoglobin variants (HbS, HgC, etc)do  not significantly interfere with this assay.   However, presence of multiple variants may affect accuracy.          ASSESSMENT    ICD-10-CM ICD-9-CM   1. Type 2 diabetes mellitus with microalbuminuria, with long-term current use of insulin  E11.29 250.40    R80.9 791.0    Z79.4 V58.67         PLAN  Diagnoses and all orders for this visit:    Type 2 diabetes mellitus with microalbuminuria, with long-term current use of insulin  -     tirzepatide 7.5 mg/0.5 mL PnIj; Inject 7.5 mg into the skin every 7 days.  -     Hemoglobin A1C; Future          Reviewed pathophysiology of diabetes, complications related to the disease, importance of annual dilated eye exam and daily foot examination. Explained SE LETHA, dosage of medications. Written instructions given and reviewed with patient and patient verbalizes understanding.     Lunch: typically high carb    PATIENT INSTRUCTIONS     Lifestyle modification with diabetic diet and at least 30 minutes of physical activity daily recommended.  Diabetic Foot Exam deferred today due to virtual visit. Will plan to be done at next in-person visit.   A1c prior to follow up.   7:30 am appt.      Increase Mounjaro to 7.5 mg subcutaneously every 7 days.   Continue Lantus 36 units subcutaneously daily.   Continue Novolog 20 units subcutaneously three times daily with meals plus  correction dosing.  Take 10-15 minutes before meal.   Bring insulin to work, take before lunch.      <200 = +0u  201-250 = +2u  251-300=+4u  301-350=+6u  >350=+8u    Continue Chris 2 CGM  Will send order for Chris 3 to your pharmacy.   Blood Sugar Goals:       Fastin-130.       1-2 hours after a meal: Less than 180.     Follow up in about 4 weeks (around 2023) for CHRIS READER, SCHEDULE NON-FASTING LABS.    Portions of this note were prepared with [employer] Naturally Speaking voice recognition transcription software. Grammatical errors, including garbled syntax, mangle pronouns, and other bizarre constructions may be attributed to that software system.

## 2023-08-24 NOTE — PATIENT INSTRUCTIONS
PATIENT INSTRUCTIONS     Lifestyle modification with diabetic diet and at least 30 minutes of physical activity daily recommended.  Diabetic Foot Exam deferred today due to virtual visit. Will plan to be done at next in-person visit.   A1c prior to follow up.   7:30 am appt.      Increase Mounjaro to 7.5 mg subcutaneously every 7 days.   Continue Lantus 36 units subcutaneously daily.   Continue Novolog 20 units subcutaneously three times daily with meals plus correction dosing.  Take 10-15 minutes before meal.   Bring insulin to work, take before lunch.      <200 = +0u  201-250 = +2u  251-300=+4u  301-350=+6u  >350=+8u    Continue Chris 2 CGM  Will send order for Chris 3 to your pharmacy.   Blood Sugar Goals:       Fastin-130.

## 2023-08-31 ENCOUNTER — PATIENT MESSAGE (OUTPATIENT)
Dept: FAMILY MEDICINE | Facility: CLINIC | Age: 53
End: 2023-08-31
Payer: COMMERCIAL

## 2023-09-05 ENCOUNTER — PATIENT MESSAGE (OUTPATIENT)
Dept: FAMILY MEDICINE | Facility: CLINIC | Age: 53
End: 2023-09-05
Payer: COMMERCIAL

## 2023-09-05 ENCOUNTER — TELEPHONE (OUTPATIENT)
Dept: FAMILY MEDICINE | Facility: CLINIC | Age: 53
End: 2023-09-05
Payer: COMMERCIAL

## 2023-09-05 DIAGNOSIS — F17.200 SMOKING: Primary | ICD-10-CM

## 2023-09-11 ENCOUNTER — PATIENT MESSAGE (OUTPATIENT)
Dept: FAMILY MEDICINE | Facility: CLINIC | Age: 53
End: 2023-09-11
Payer: COMMERCIAL

## 2023-09-12 ENCOUNTER — PATIENT MESSAGE (OUTPATIENT)
Dept: FAMILY MEDICINE | Facility: CLINIC | Age: 53
End: 2023-09-12
Payer: COMMERCIAL

## 2023-09-12 RX ORDER — SULFAMETHOXAZOLE AND TRIMETHOPRIM 800; 160 MG/1; MG/1
1 TABLET ORAL 2 TIMES DAILY
Qty: 20 TABLET | Refills: 0 | Status: SHIPPED | OUTPATIENT
Start: 2023-09-12 | End: 2023-09-22

## 2023-09-12 NOTE — TELEPHONE ENCOUNTER
Patient needs appointment for this considering her diabetes.  Urgent care or ER today if needed otherwise get her scheduled for tomorrow..  I did send in an antibiotic to start until she comes in.

## 2023-09-14 ENCOUNTER — OFFICE VISIT (OUTPATIENT)
Dept: FAMILY MEDICINE | Facility: CLINIC | Age: 53
End: 2023-09-14
Payer: COMMERCIAL

## 2023-09-14 ENCOUNTER — LAB VISIT (OUTPATIENT)
Dept: LAB | Facility: HOSPITAL | Age: 53
End: 2023-09-14
Attending: NURSE PRACTITIONER
Payer: COMMERCIAL

## 2023-09-14 VITALS
DIASTOLIC BLOOD PRESSURE: 63 MMHG | HEART RATE: 81 BPM | HEIGHT: 65 IN | SYSTOLIC BLOOD PRESSURE: 103 MMHG | BODY MASS INDEX: 28.99 KG/M2 | WEIGHT: 174 LBS | TEMPERATURE: 98 F

## 2023-09-14 DIAGNOSIS — R80.9 TYPE 2 DIABETES MELLITUS WITH MICROALBUMINURIA, WITH LONG-TERM CURRENT USE OF INSULIN: ICD-10-CM

## 2023-09-14 DIAGNOSIS — Z79.4 TYPE 2 DIABETES MELLITUS WITH MICROALBUMINURIA, WITH LONG-TERM CURRENT USE OF INSULIN: ICD-10-CM

## 2023-09-14 DIAGNOSIS — E11.29 TYPE 2 DIABETES MELLITUS WITH MICROALBUMINURIA, WITH LONG-TERM CURRENT USE OF INSULIN: ICD-10-CM

## 2023-09-14 DIAGNOSIS — L60.2 ONYCHOGRYPOSIS OF TOENAIL: ICD-10-CM

## 2023-09-14 DIAGNOSIS — E11.69 HYPERLIPIDEMIA ASSOCIATED WITH TYPE 2 DIABETES MELLITUS: ICD-10-CM

## 2023-09-14 DIAGNOSIS — E11.69 TYPE 2 DIABETES MELLITUS WITH HYPERTRIGLYCERIDEMIA: ICD-10-CM

## 2023-09-14 DIAGNOSIS — E78.5 HYPERLIPIDEMIA ASSOCIATED WITH TYPE 2 DIABETES MELLITUS: ICD-10-CM

## 2023-09-14 DIAGNOSIS — L03.031 PARONYCHIA OF GREAT TOE, RIGHT: Primary | ICD-10-CM

## 2023-09-14 DIAGNOSIS — E78.1 TYPE 2 DIABETES MELLITUS WITH HYPERTRIGLYCERIDEMIA: ICD-10-CM

## 2023-09-14 LAB
ESTIMATED AVG GLUCOSE: 212 MG/DL (ref 68–131)
HBA1C MFR BLD: 9 % (ref 4–5.6)

## 2023-09-14 PROCEDURE — 3046F PR MOST RECENT HEMOGLOBIN A1C LEVEL > 9.0%: ICD-10-PCS | Mod: CPTII,S$GLB,, | Performed by: FAMILY MEDICINE

## 2023-09-14 PROCEDURE — 3066F PR DOCUMENTATION OF TREATMENT FOR NEPHROPATHY: ICD-10-PCS | Mod: CPTII,S$GLB,, | Performed by: FAMILY MEDICINE

## 2023-09-14 PROCEDURE — 83036 HEMOGLOBIN GLYCOSYLATED A1C: CPT | Performed by: NURSE PRACTITIONER

## 2023-09-14 PROCEDURE — 99214 OFFICE O/P EST MOD 30 MIN: CPT | Mod: S$GLB,,, | Performed by: FAMILY MEDICINE

## 2023-09-14 PROCEDURE — 3074F SYST BP LT 130 MM HG: CPT | Mod: CPTII,S$GLB,, | Performed by: FAMILY MEDICINE

## 2023-09-14 PROCEDURE — 36415 COLL VENOUS BLD VENIPUNCTURE: CPT | Mod: PO | Performed by: NURSE PRACTITIONER

## 2023-09-14 PROCEDURE — 3066F NEPHROPATHY DOC TX: CPT | Mod: CPTII,S$GLB,, | Performed by: FAMILY MEDICINE

## 2023-09-14 PROCEDURE — 99214 PR OFFICE/OUTPT VISIT, EST, LEVL IV, 30-39 MIN: ICD-10-PCS | Mod: S$GLB,,, | Performed by: FAMILY MEDICINE

## 2023-09-14 PROCEDURE — 3078F PR MOST RECENT DIASTOLIC BLOOD PRESSURE < 80 MM HG: ICD-10-PCS | Mod: CPTII,S$GLB,, | Performed by: FAMILY MEDICINE

## 2023-09-14 PROCEDURE — 3062F PR POS MACROALBUMINURIA RESULT DOCUMENTED/REVIEW: ICD-10-PCS | Mod: CPTII,S$GLB,, | Performed by: FAMILY MEDICINE

## 2023-09-14 PROCEDURE — 4010F ACE/ARB THERAPY RXD/TAKEN: CPT | Mod: CPTII,S$GLB,, | Performed by: FAMILY MEDICINE

## 2023-09-14 PROCEDURE — 99999 PR PBB SHADOW E&M-EST. PATIENT-LVL V: ICD-10-PCS | Mod: PBBFAC,,, | Performed by: FAMILY MEDICINE

## 2023-09-14 PROCEDURE — 3008F PR BODY MASS INDEX (BMI) DOCUMENTED: ICD-10-PCS | Mod: CPTII,S$GLB,, | Performed by: FAMILY MEDICINE

## 2023-09-14 PROCEDURE — 3078F DIAST BP <80 MM HG: CPT | Mod: CPTII,S$GLB,, | Performed by: FAMILY MEDICINE

## 2023-09-14 PROCEDURE — 3046F HEMOGLOBIN A1C LEVEL >9.0%: CPT | Mod: CPTII,S$GLB,, | Performed by: FAMILY MEDICINE

## 2023-09-14 PROCEDURE — 99999 PR PBB SHADOW E&M-EST. PATIENT-LVL V: CPT | Mod: PBBFAC,,, | Performed by: FAMILY MEDICINE

## 2023-09-14 PROCEDURE — 1159F MED LIST DOCD IN RCRD: CPT | Mod: CPTII,S$GLB,, | Performed by: FAMILY MEDICINE

## 2023-09-14 PROCEDURE — 3062F POS MACROALBUMINURIA REV: CPT | Mod: CPTII,S$GLB,, | Performed by: FAMILY MEDICINE

## 2023-09-14 PROCEDURE — 3008F BODY MASS INDEX DOCD: CPT | Mod: CPTII,S$GLB,, | Performed by: FAMILY MEDICINE

## 2023-09-14 PROCEDURE — 1159F PR MEDICATION LIST DOCUMENTED IN MEDICAL RECORD: ICD-10-PCS | Mod: CPTII,S$GLB,, | Performed by: FAMILY MEDICINE

## 2023-09-14 PROCEDURE — 3074F PR MOST RECENT SYSTOLIC BLOOD PRESSURE < 130 MM HG: ICD-10-PCS | Mod: CPTII,S$GLB,, | Performed by: FAMILY MEDICINE

## 2023-09-14 PROCEDURE — 4010F PR ACE/ARB THEARPY RXD/TAKEN: ICD-10-PCS | Mod: CPTII,S$GLB,, | Performed by: FAMILY MEDICINE

## 2023-09-14 RX ORDER — PRAZOSIN HYDROCHLORIDE 2 MG/1
2 CAPSULE ORAL NIGHTLY
COMMUNITY
Start: 2023-09-01

## 2023-09-14 NOTE — PROGRESS NOTES
Patient noticed some infection in her right great toe past few days.  She has some redness and irritation around the nail.  She states she is squeezed a minimal amount of pus from the medial aspect.  She did contact us and started on Bactrim then the past couple days.  She states it is improving.  She denies any fever chills.  She does have deformed nails.  She has diabetes which has not been well controlled.  She is seeing endocrine nurse practitioner.  Hyperlipidemia/hypertriglyceridemia will need follow-up laboratory arranged.          Yarely was seen today for infected toe.    Diagnoses and all orders for this visit:    Paronychia of great toe, right  -     Ambulatory referral/consult to Podiatry; Future    Onychogryposis of toenail  -     Ambulatory referral/consult to Podiatry; Future    Type 2 diabetes mellitus with microalbuminuria, with long-term current use of insulin  -     Ambulatory referral/consult to Podiatry; Future    Type 2 diabetes mellitus with hypertriglyceridemia  -     Lipid Panel; Future    Hyperlipidemia associated with type 2 diabetes mellitus  -     Lipid Panel; Future      Appears to be improving.  No current abscess or purulent material noted.  She will continue with Bactrim.  Will have her see Podiatry.  Schedule lipid panel for 3 months.  Follow-up as needed prior to this if symptoms worsen.  Keep scheduled follow-up with endocrine nurse practitioner.          Diabetes Management Status    Statin: Taking  ACE/ARB: Taking    Screening or Prevention Patient's value Goal Complete/Controlled?   HgA1C Testing and Control   Lab Results   Component Value Date    HGBA1C 11.7 (H) 06/09/2023      Annually/Less than 8% No   Lipid profile : 06/09/2023 Annually Yes   LDL control Lab Results   Component Value Date    LDLCALC Invalid, Trig>400.0 06/09/2023    Annually/Less than 100 mg/dl  Yes   Nephropathy screening Lab Results   Component Value Date    LABMICR 2294.0 06/09/2023     No results found  "for: "PROTEINUA" Annually Yes   Blood pressure BP Readings from Last 1 Encounters:   09/14/23 103/63    Less than 140/90 Yes   Dilated retinal exam : 04/14/2023 Annually Yes   Foot exam   : 08/04/2023 Annually Yes       Past Medical History:  Past Medical History:   Diagnosis Date    Anxiety     Cholelithiasis     Depression     Fatty liver     HTN (hypertension)     Hyperlipidemia     Proteinuria     Dr. Sadler    Severe obesity (BMI 35.0-35.9 with comorbidity) 3/16/2018    ADEN (stress urinary incontinence, female)     Type 2 diabetes mellitus with hypertriglyceridemia 6/6/2022    Type 2 diabetes mellitus with microalbuminuria, with long-term current use of insulin 7/22/2016    Vitamin D insufficiency      History reviewed. No pertinent surgical history.  Review of patient's allergies indicates:   Allergen Reactions    Pcn [penicillins] Rash     Current Outpatient Medications on File Prior to Visit   Medication Sig Dispense Refill    ARIPiprazole (ABILIFY) 5 MG Tab Take 1 tablet (5 mg total) by mouth once daily. 30 tablet 0    blood-glucose meter kit Use as directed      clonazePAM (KLONOPIN) 0.5 MG tablet Take 1 tablet (0.5 mg total) by mouth every evening. 30 tablet 0    fenofibrate (TRICOR) 48 MG tablet Take 1 tablet (48 mg total) by mouth once daily. 90 tablet 3    flash glucose scanning reader (FREESTYLE VIRGILIO 14 DAY READER) Misc Test glucose 3 times daily 1 each prn    flash glucose sensor (FREESTYLE VIRGILIO 14 DAY SENSOR) Kit USE TO TEST GLUCOSE THREE TIMES DAILY(CHANGE SENSOR EVERY 14 DAYS) 6 kit 3    insulin (LANTUS SOLOSTAR U-100 INSULIN) glargine 100 units/mL SubQ pen Inject 36 Units into the skin once daily. 32.4 mL 3    insulin aspart U-100 (NOVOLOG FLEXPEN U-100 INSULIN) 100 unit/mL (3 mL) InPn pen Inject 20 Units into the skin 3 (three) times daily before meals. +scale.<200=+0u, 201-250=+2u,251-300=+4u, 301-350=+6u, >350=+8u. Max 84 units/day 27 mL 5    lisinopriL (PRINIVIL,ZESTRIL) 20 MG tablet TAKE " "ONE TABLET BY MOUTH once DAILY 90 tablet 1    prazosin (MINIPRESS) 2 MG Cap Take 2 mg by mouth every evening.      rosuvastatin (CRESTOR) 40 MG Tab Take 1 tablet (40 mg total) by mouth once daily. 90 tablet 1    sertraline (ZOLOFT) 100 MG tablet Take 2 tablets (200 mg total) by mouth once daily. 60 tablet 0    spironolactone (ALDACTONE) 25 MG tablet Take 1 tablet (25 mg total) by mouth once daily. 30 tablet 11    sulfamethoxazole-trimethoprim 800-160mg (BACTRIM DS) 800-160 mg Tab Take 1 tablet by mouth 2 (two) times daily. for 10 days 20 tablet 0    SURE COMFORT PEN NEEDLE 31 gauge x 3/16" Ndle USE TO INJECT INSULINS FOUR TIMES DAILY 100 each 5    tirzepatide 7.5 mg/0.5 mL PnIj Inject 7.5 mg into the skin every 7 days. 4 pen 0     No current facility-administered medications on file prior to visit.     Social History     Socioeconomic History    Marital status:    Tobacco Use    Smoking status: Every Day     Current packs/day: 0.00     Average packs/day: 1.5 packs/day for 28.0 years (42.0 ttl pk-yrs)     Types: Cigarettes     Start date: 1994     Last attempt to quit: 2022     Years since quittin.1    Smokeless tobacco: Never   Substance and Sexual Activity    Alcohol use: No     Family History   Problem Relation Age of Onset    Hypertension Mother     Diabetes Mother     Lymphoma Father     Hypertension Father     Diabetes Father     Heart disease Brother 57    Breast cancer Maternal Aunt     Ovarian cancer Maternal Grandmother     Kidney disease Neg Hx     Glaucoma Neg Hx     Macular degeneration Neg Hx     Retinal detachment Neg Hx            ROS:  GENERAL: No fever, chills,  or significant weight changes.   CARDIOVASCULAR: Denies chest pain, PND, orthopnea or reduced exercise tolerance.  ABDOMEN: Appetite fine. Denies diarrhea, abdominal pain, hematemesis or blood in stool.  URINARY: No flank pain, dysuria or hematuria.      OBJECTIVE:   Vitals:    23 0905   BP: 103/63   Pulse: 81 " "  Temp: 97.5 °F (36.4 °C)   Weight: 78.9 kg (174 lb)   Height: 5' 5" (1.651 m)     Wt Readings from Last 3 Encounters:   09/14/23 78.9 kg (174 lb)   08/04/23 78.3 kg (172 lb 9.6 oz)   08/08/23 77.7 kg (171 lb 6.4 oz)       APPEARANCE: Well nourished, well developed, in no acute distress.   Feet:  She has some erythema medial to the great toenail and proximal.  There is no fluctuance.  Not significantly tender.  No discharge.  She has missed shape now with some onychomycosis changes.  Photograph below.  MENTAL STATUS: Alert. Oriented x 3.             "

## 2023-09-20 ENCOUNTER — CLINICAL SUPPORT (OUTPATIENT)
Dept: SMOKING CESSATION | Facility: CLINIC | Age: 53
End: 2023-09-20
Payer: COMMERCIAL

## 2023-09-20 VITALS
BODY MASS INDEX: 28.99 KG/M2 | DIASTOLIC BLOOD PRESSURE: 59 MMHG | WEIGHT: 174 LBS | HEIGHT: 65 IN | SYSTOLIC BLOOD PRESSURE: 105 MMHG | OXYGEN SATURATION: 95 %

## 2023-09-20 DIAGNOSIS — F17.200 NICOTINE DEPENDENCE: Primary | ICD-10-CM

## 2023-09-20 PROCEDURE — 90853 PR GROUP PSYCHOTHERAPY: ICD-10-PCS | Mod: S$GLB,,,

## 2023-09-20 PROCEDURE — 99404 PREV MED CNSL INDIV APPRX 60: CPT | Mod: S$GLB,,,

## 2023-09-20 PROCEDURE — 90853 GROUP PSYCHOTHERAPY: CPT | Mod: S$GLB,,,

## 2023-09-20 PROCEDURE — 99999 PR PBB SHADOW E&M-EST. PATIENT-LVL II: CPT | Mod: PBBFAC,,,

## 2023-09-20 PROCEDURE — 99999 PR PBB SHADOW E&M-EST. PATIENT-LVL II: ICD-10-PCS | Mod: PBBFAC,,,

## 2023-09-20 PROCEDURE — 99404 PR PREVENT COUNSEL,INDIV,60 MIN: ICD-10-PCS | Mod: S$GLB,,,

## 2023-09-20 RX ORDER — IBUPROFEN 200 MG
2 TABLET ORAL DAILY
Qty: 56 PATCH | Refills: 0 | Status: SHIPPED | OUTPATIENT
Start: 2023-09-20 | End: 2023-10-18 | Stop reason: SDUPTHER

## 2023-09-20 RX ORDER — DM/P-EPHED/ACETAMINOPH/DOXYLAM 30-7.5/3
2 LIQUID (ML) ORAL
Qty: 108 LOZENGE | Refills: 0 | Status: SHIPPED | OUTPATIENT
Start: 2023-09-20 | End: 2023-10-18 | Stop reason: SDUPTHER

## 2023-09-20 NOTE — Clinical Note
Patient was an active participant in group discussions. She states she smokes 30 cigarettes per day. Per Smokerlyzer CO 25 ppm, last smoked 2 hours prior to meeting. The patient just returned to the program today and had her intake meeting before groups . She will begin using 21 mg patches (doubled) and 2 mg lozenges prn. Session Focus: completion of TCRS (Tobacco Cessation Rating Scale) reviewed strategies, cues, triggers, high risk situations, lapses, relapses, diet, exercise, stress, relaxation, sleep, habitual behavior, and life style changes. GOALS: 1. Notice triggers. 2. Stick to no more than 20 cpd. The patient denies any abnormal behavioral or mental changes at this time. The patient will continue with  therapy sessions and medication monitoring by CTTS. Prescribed medication management will be by physician.

## 2023-09-20 NOTE — PROGRESS NOTES
The patient returns to the program for her second quit attempt. Commended her for returning for help. She was quit for 8 months and is now back to smoking for 5 months. Per Smokerlyzer CO 26 ppm, last smoked 1 hour prior to meeting. She states she's smoking 30+ cigarettes per day. The longest she has quit is for 3 years. She will begin using 21 mg patches (doubled) and 2 mg lozenges prn. She will follow up With Groups. She will be staying today for group after this intake meeting. FTND of 10 indicates a high level of tobacco/nicotine dependency; CESD of 11 is preceived as no level of mental distress or depression at this time.

## 2023-09-21 NOTE — PROGRESS NOTES
Smoking Cessation Group Session #6    Site: [facility]  Date:  9/20/2023  Clinical Status of Patient: Outpatient   Length of Service and Code: 90 minutes - [id number]   Number in Attendance: 6  CO Monitor Score: 25 ppm  Group Activities/Focus of Group:  Sharing last weeks challenges, triggers, and coping activities to remain quit and/ or keep making progress toward cessation, completion of TCRS (Tobacco Cessation Rating Scale) learned addiction model, personal reasons for quitting, medications, goals, quit date.    Specific session focus: completion of TCRS (Tobacco Cessation Rating Scale) reviewed strategies, cues, triggers, high risk situations, lapses, relapses, diet, exercise, stress, relaxation, sleep, habitual behavior, and life style changes.    Target symptoms:  withdrawal and medication side effects             The following were rated moderate (3) to severe (4) on TCRS:       Moderate 3: none     Severe 4:   none    Patient's Response to Intervention: Patient was an active participant in group discussions. She states she smokes 30 cigarettes per day. Per Smokerlyzer CO 25 ppm, last smoked 2 hours prior to meeting. The patient just returned to the program today and had her intake meeting before groups . She will begin using 21 mg patches (doubled) and 2 mg lozenges prn. Session Focus: completion of TCRS (Tobacco Cessation Rating Scale) reviewed strategies, cues, triggers, high risk situations, lapses, relapses, diet, exercise, stress, relaxation, sleep, habitual behavior, and life style changes. GOALS: 1. Notice triggers. 2. Stick to no more than 20 cpd. The patient denies any abnormal behavioral or mental changes at this time. The patient will continue with  therapy sessions and medication monitoring by CTTS. Prescribed medication management will be by physician.    Progress Toward Goals and Other Mental Status Changes: The patient denies any abnormal behavioral or mental changes at this time.     Diagnosis:  Z17.200  Plan: The patient will continue with group therapy sessions and medication regimen prescribed with management by physician or by the Cessation Clinic Provider. Patient will inform Smoking Cessation Counselor of symptoms as rated high on TCRS.    Return to Clinic: 1 week    Quit Date: tbd   Planned Quit Date: tbd

## 2023-09-25 ENCOUNTER — PATIENT MESSAGE (OUTPATIENT)
Dept: DIABETES | Facility: CLINIC | Age: 53
End: 2023-09-25
Payer: COMMERCIAL

## 2023-09-28 ENCOUNTER — PATIENT MESSAGE (OUTPATIENT)
Dept: DIABETES | Facility: CLINIC | Age: 53
End: 2023-09-28

## 2023-09-28 ENCOUNTER — CLINICAL SUPPORT (OUTPATIENT)
Dept: DIABETES | Facility: CLINIC | Age: 53
End: 2023-09-28
Payer: COMMERCIAL

## 2023-09-28 ENCOUNTER — TELEPHONE (OUTPATIENT)
Dept: DIABETES | Facility: CLINIC | Age: 53
End: 2023-09-28
Payer: COMMERCIAL

## 2023-09-28 ENCOUNTER — OFFICE VISIT (OUTPATIENT)
Dept: DIABETES | Facility: CLINIC | Age: 53
End: 2023-09-28
Payer: COMMERCIAL

## 2023-09-28 VITALS — WEIGHT: 173.75 LBS | HEIGHT: 65 IN | BODY MASS INDEX: 28.95 KG/M2

## 2023-09-28 VITALS
WEIGHT: 173.69 LBS | HEIGHT: 65 IN | BODY MASS INDEX: 28.94 KG/M2 | SYSTOLIC BLOOD PRESSURE: 110 MMHG | DIASTOLIC BLOOD PRESSURE: 70 MMHG

## 2023-09-28 DIAGNOSIS — R80.9 TYPE 2 DIABETES MELLITUS WITH MICROALBUMINURIA, WITH LONG-TERM CURRENT USE OF INSULIN: Primary | ICD-10-CM

## 2023-09-28 DIAGNOSIS — E11.29 TYPE 2 DIABETES MELLITUS WITH MICROALBUMINURIA, WITH LONG-TERM CURRENT USE OF INSULIN: Primary | ICD-10-CM

## 2023-09-28 DIAGNOSIS — E11.9 TYPE 2 DIABETES MELLITUS WITHOUT COMPLICATION, UNSPECIFIED WHETHER LONG TERM INSULIN USE: ICD-10-CM

## 2023-09-28 DIAGNOSIS — Z79.4 TYPE 2 DIABETES MELLITUS WITH MICROALBUMINURIA, WITH LONG-TERM CURRENT USE OF INSULIN: Primary | ICD-10-CM

## 2023-09-28 PROCEDURE — 99214 PR OFFICE/OUTPT VISIT, EST, LEVL IV, 30-39 MIN: ICD-10-PCS | Mod: S$GLB,,, | Performed by: NURSE PRACTITIONER

## 2023-09-28 PROCEDURE — 3052F PR MOST RECENT HEMOGLOBIN A1C LEVEL 8.0 - < 9.0%: ICD-10-PCS | Mod: CPTII,S$GLB,, | Performed by: NURSE PRACTITIONER

## 2023-09-28 PROCEDURE — 4010F ACE/ARB THERAPY RXD/TAKEN: CPT | Mod: CPTII,S$GLB,, | Performed by: NURSE PRACTITIONER

## 2023-09-28 PROCEDURE — 99214 OFFICE O/P EST MOD 30 MIN: CPT | Mod: S$GLB,,, | Performed by: NURSE PRACTITIONER

## 2023-09-28 PROCEDURE — 99999 PR PBB SHADOW E&M-EST. PATIENT-LVL II: ICD-10-PCS | Mod: PBBFAC,,, | Performed by: DIETITIAN, REGISTERED

## 2023-09-28 PROCEDURE — G0108 DIAB MANAGE TRN  PER INDIV: HCPCS | Mod: S$GLB,,, | Performed by: DIETITIAN, REGISTERED

## 2023-09-28 PROCEDURE — 1159F PR MEDICATION LIST DOCUMENTED IN MEDICAL RECORD: ICD-10-PCS | Mod: CPTII,S$GLB,, | Performed by: NURSE PRACTITIONER

## 2023-09-28 PROCEDURE — 99999 PR PBB SHADOW E&M-EST. PATIENT-LVL II: CPT | Mod: PBBFAC,,, | Performed by: DIETITIAN, REGISTERED

## 2023-09-28 PROCEDURE — 95251 PR GLUCOSE MONITOR, 72 HOUR, PHYS INTERP: ICD-10-PCS | Mod: S$GLB,,, | Performed by: NURSE PRACTITIONER

## 2023-09-28 PROCEDURE — 99999 PR PBB SHADOW E&M-EST. PATIENT-LVL IV: ICD-10-PCS | Mod: PBBFAC,,, | Performed by: NURSE PRACTITIONER

## 2023-09-28 PROCEDURE — 3008F PR BODY MASS INDEX (BMI) DOCUMENTED: ICD-10-PCS | Mod: CPTII,S$GLB,, | Performed by: NURSE PRACTITIONER

## 2023-09-28 PROCEDURE — 3066F PR DOCUMENTATION OF TREATMENT FOR NEPHROPATHY: ICD-10-PCS | Mod: CPTII,S$GLB,, | Performed by: NURSE PRACTITIONER

## 2023-09-28 PROCEDURE — 3008F BODY MASS INDEX DOCD: CPT | Mod: CPTII,S$GLB,, | Performed by: NURSE PRACTITIONER

## 2023-09-28 PROCEDURE — 3052F HG A1C>EQUAL 8.0%<EQUAL 9.0%: CPT | Mod: CPTII,S$GLB,, | Performed by: NURSE PRACTITIONER

## 2023-09-28 PROCEDURE — G0108 PR DIAB MANAGE TRN  PER INDIV: ICD-10-PCS | Mod: S$GLB,,, | Performed by: DIETITIAN, REGISTERED

## 2023-09-28 PROCEDURE — 3074F SYST BP LT 130 MM HG: CPT | Mod: CPTII,S$GLB,, | Performed by: NURSE PRACTITIONER

## 2023-09-28 PROCEDURE — 3062F POS MACROALBUMINURIA REV: CPT | Mod: CPTII,S$GLB,, | Performed by: NURSE PRACTITIONER

## 2023-09-28 PROCEDURE — 3062F PR POS MACROALBUMINURIA RESULT DOCUMENTED/REVIEW: ICD-10-PCS | Mod: CPTII,S$GLB,, | Performed by: NURSE PRACTITIONER

## 2023-09-28 PROCEDURE — 4010F PR ACE/ARB THEARPY RXD/TAKEN: ICD-10-PCS | Mod: CPTII,S$GLB,, | Performed by: NURSE PRACTITIONER

## 2023-09-28 PROCEDURE — 3074F PR MOST RECENT SYSTOLIC BLOOD PRESSURE < 130 MM HG: ICD-10-PCS | Mod: CPTII,S$GLB,, | Performed by: NURSE PRACTITIONER

## 2023-09-28 PROCEDURE — 1159F MED LIST DOCD IN RCRD: CPT | Mod: CPTII,S$GLB,, | Performed by: NURSE PRACTITIONER

## 2023-09-28 PROCEDURE — 95251 CONT GLUC MNTR ANALYSIS I&R: CPT | Mod: S$GLB,,, | Performed by: NURSE PRACTITIONER

## 2023-09-28 PROCEDURE — 3066F NEPHROPATHY DOC TX: CPT | Mod: CPTII,S$GLB,, | Performed by: NURSE PRACTITIONER

## 2023-09-28 PROCEDURE — 3078F DIAST BP <80 MM HG: CPT | Mod: CPTII,S$GLB,, | Performed by: NURSE PRACTITIONER

## 2023-09-28 PROCEDURE — 3078F PR MOST RECENT DIASTOLIC BLOOD PRESSURE < 80 MM HG: ICD-10-PCS | Mod: CPTII,S$GLB,, | Performed by: NURSE PRACTITIONER

## 2023-09-28 PROCEDURE — 99999 PR PBB SHADOW E&M-EST. PATIENT-LVL IV: CPT | Mod: PBBFAC,,, | Performed by: NURSE PRACTITIONER

## 2023-09-28 RX ORDER — BLOOD-GLUCOSE SENSOR
1 EACH MISCELLANEOUS DAILY
Qty: 2 EACH | Refills: 11 | Status: SHIPPED | OUTPATIENT
Start: 2023-09-28 | End: 2024-09-27

## 2023-09-28 NOTE — PROGRESS NOTES
"Diabetes Care Specialist Progress Note  Author: Carmina De Los Santos RD  Date: 9/28/2023    Program Intake  Reason for Diabetes Program Visit:: Intervention  Type of Intervention:: Individual  Individual: Education  Education: Nutrition and Meal Planning  Current diabetes risk level:: moderate  In the last 12 months, have you:: used emergency room services  Was the ER or hospital admission related to diabetes?: No  Permission to speak with others about care:: no    Lab Results   Component Value Date    HGBA1C 9.0 (H) 09/14/2023       Clinical  Weight: 78.8 kg (173 lb 11.6 oz)   Height: 5' 5" (165.1 cm)   Body mass index is 28.91 kg/m².    Clinical Assessment  Current Diabetes Treatment: Insulin, Injectable (Mounjaro 10 mg, Lantus 36 units mornings, Novolog 16 units +2 201-250, 251-300 +4, 301-350 +6, >350 +8)  In the last month, how often have you experienced low blood sugar?: once every other week (one overnight hypoglycemic episode)  Are you able to tell when your blood sugar is low?: Yes  What symptoms do you experience?: Shaky, Sweaty, Tiredness  Have you ever been hospitalized because your blood sugar was too low?: no  How do you treat hypoglycemia (low blood sugar)?: 5-6 pieces of hard candy, 1/2 can soda/fruit juice  Have you ever experienced hyperglycemia (high blood sugar)?: yes  In the last month, how often have you experienced high blood sugar?: more than once a week  Have you ever been hospitalized because your blood sugar was high?: no    Medication Information  How many days a week do you miss your medications?: 3 or more (skipping Novolog doses)  Do you sometimes have difficulty refilling your medications?: No  Medication adherence impacting ability to self-manage diabetes?: Yes    Nutritional Status  Diet: Regular  Meal Plan 24 Hour Recall:  (Drinks water, diet coke, iced coffee from [employer], hot coffee with italian sweet cream.)  Meal Plan 24 Hour Recall - Breakfast: Mayn[employer] Ice coffee (46 g " sugar)  Meal Plan 24 Hour Recall - Lunch: Eat what is prepared at nursing home.  Meal Plan 24 Hour Recall - Dinner: shrimp, broccoli, and rice stir ibarra  Meal Plan 24 Hour Recall - Snack: coffee with italian sweet cream    Additional Social History    Support  Does anyone support you with your diabetes care?: yes  Who supports you?: self  Who takes you to your medical appointments?: self  Does the current support meet the patient's needs?: Yes  Is Support an area impacting ability to self-manage diabetes?: No    Access to Mass Media & Technology  Does the patient have access to any of the following devices or technologies?: Smart phone, Internet Access  Media or technology needs impacting ability to self-manage diabetes?: No    Cognitive/Behavioral Health  Alert and Oriented: Yes  Difficulty Thinking: No  Requires Prompting: No  Requires assistance for routine expression?: No  Cognitive or behavioral barriers impacting ability to self-manage diabetes?: No    Culture/Cheondoism  Culture or Buddhist beliefs that may impact ability to access healthcare: No    Communication  Language preference: English  Hearing Problems: No  Vision Problems: No  Communication needs impacting ability to self-manage diabetes?: No    Health Literacy  Preferred Learning Method: Face to Face  How often do you need to have someone help you read instructions, pamphlets, or written material from your doctor or pharmacy?: Never  Health literacy needs impacting ability to self-manage diabetes?: No      Diabetes Self-Management Skills Assessment  Nutrition/Healthy Eating  Challenges to healthy eating:: snacking between meals and at night, portion control, lack of will power, eating out, going to parties  Method of carbohydrate measurement:: plate method, other (see comments) (eating less candy)  Patient can identify foods that impact blood sugar.: yes  Patient-identified foods:: soda, sweets, starchy vegetables (corn, peas,  beans)  Nutrition/Healthy Eating Skills Assessment Completed:: Yes  Assessment indicates:: Knowledge deficit, Instruction Needed  Area of need?: Yes    Medications  Patient is able to describe current diabetes management routine.: yes  Diabetes management routine:: injectable medications, insulin  Patient is able to identify current diabetes medications, dosages, and appropriate timing of medications.: yes  Patient understands the purpose of the medications taken for diabetes.: yes  Patient reports problems or concerns with current medication regimen.: no  Medication Skills Assessment Completed:: Yes  Assessment indicates:: Adequate understanding  Area of need?: No    Home Blood Glucose Monitoring  Patient states that blood sugar is checked at home daily.: yes  Monitoring Method:: personal continuous glucose monitor  Personal CGM type:: VendorStack 14 day with   Patient is able to use personal CGM appropriately.: yes  CGM Report reviewed?: yes  Home Blood Glucose Monitoring Skills Assessment Completed: : Yes  Assessment indicates:: Instruction Needed (VendorStack 3 account set up today.)  Area of need?: Yes  Platypus Platform download (09/15/23 to 09/28/23): See media file for details. Almost half of glucoses are usually in target range. However, significant post prandial glucose excursion noted after meals.  1 episodes of hypoglycemia noted in the Plat[employer] Platform report.    Sensor usage: 59%  Average glucose: 199mg/dL    24% CGM readings greater than 250 mg/dL  29% CGM readings between 181-250 mg/dL  46% CGM readings in target glucose range of  mg/dL   1% CGM readings between 54-79%  0% CGM readings less than 54 mg/dL     Acute Complications  Patient is able to identify types of acute complications: Yes  Patient Identified:: Hypoglycemia  Patient is able to state the basic meaning of hypoglycemia?: Yes  Able to state the blood sugar range for hypoglycemia?: yes  Patient stated range:: 70s  Patient can identify general symptoms  of hypoglycemia: yes  Patient identified:: shakiness, sweating, fatigue  Able to state proper treatment of hypoglycemia?: yes  Patient identified:: 5-6 pieces of hard candy, 1/2 can soda/fruit juice  Acute Complications Skills Assessment Completed: : Yes  Assessment indicates:: Adequate understanding  Area of need?: No    Chronic Complications  Deferred due to:: Time  Area of need?: No    Psychosocial/Coping  Patient can identify ways of coping with chronic disease.: yes  Patient-stated ways of coping with chronic disease:: support from loved ones  Psychosocial/Coping Skills Assessment Completed: : Yes  Assessment indicates:: Adequate understanding  Area of need?: No      Assessment Summary and Plan    Based on today's diabetes care assessment, the following areas of need were identified:          9/28/2023    12:01 AM   Social   Support No   Access to Mass Media/Tech No   Cognitive/Behavioral Health No   Culture/Cheondoism No   Communication No   Health Literacy No            9/28/2023    12:01 AM   Clinical   Medication Adherence Yes. Reviewed importance of taking medications.            9/28/2023    12:01 AM   Diabetes Self-Management Skills   Nutrition/Healthy Eating Yes, see care plan   Medication Discussed medication changes made at appt with JOSE Machuca NP today.   Home Blood Glucose Monitoring Yes, see care plan. Start Chris 3 as soon as Chris 14 day sensor ends.  Chris 3 accounts sent up.   Acute Complications No   Chronic Complications No   Psychosocial/Coping No          Today's interventions were provided through individual discussion, instruction, and written materials were provided.      Patient verbalized understanding of instruction and written materials.  Pt was able to return back demonstration of instructions today. Patient understood key points, needs reinforcement and further instruction.     Diabetes Self-Management Care Plan:    Today's Diabetes Self-Management Care Plan was developed with  "Yarely's input. Yarely has agreed to work toward the following goal(s) to improve his/her overall diabetes control.      Care Plan: Diabetes Management   Updates made since 8/29/2023 12:00 AM        Problem: Medications         Goal: Patient Agrees to take 36 units of Lantus (mornings), 14 units of Novolog + correct above 200 mg/dl with ISF of 25 before meals, Mounjaro 10 mg (Fridays)    Start Date: 8/3/2023   Expected End Date: 11/2/2023   Priority: Medium   Barriers: No Barriers Identified   Note:    Take Novolog before meals.  Take Novolog with you.  Patient reports she brings to work in a cooler.       Problem: Healthy Eating         Goal: Follow Plate Method.  Decrease candy intake to 2 Reeses cups after dinner and one after lunch. Change salty snack midmorning to raw vegetables and ranch.    Start Date: 8/3/2023   Expected End Date: 11/2/2023   Priority: High   Barriers: Lack of Motivation to Change   Note:    9/28/23:  Patient reports she is consuming less candy.  Today we reviewed the need to have protein paired with carbohydrate at each meal.  Discussed choosing a protein shake as a base for iced coffee made at home.  Continue to eat breakfast burrito daily.  Try protein shake or sugar free cream for coffee at night.    8/3/23: Explained patient can skip side dish at lunch and have additional Henrik cup.  Added Henrik cup to the list of carbohydrate servings in diabetes management booklet.  Patient ate candy daily after school with her father so this is part of a memory and dietary habit.  Patient more open to moderation.  Drinks diet drinks and water.  She does not "care for fruit."         Problem: Blood Glucose Self-Monitoring         Long-Range Goal: Patient agrees to finish using last Chris 14 day sensor.  Place Chris 3 sensor and scan to start it.    Start Date: 9/28/2023   Priority: Medium   Barriers: No Barriers Identified   Note:    UPGRADE/EDUCATION OF FREESTYLE CHRIS 3 PERSONAL CONTINOUS GLUCOSE " "MONITORING SYSTEM (CGMS)     REFERRING PROVIDER: Elida Machuca NP     Patient is here in clinic today for placement of personal continuous glucose monitoring system (CGMS).   Patient has Freestyle Chris 3 Freestyle Chris 3 Sensor sample and prescription. Patient will use iphone for scanning to obtain glucose readings.  A detailed explanation of Freestyle Chris Continuous Glucose Monitoring System was provided. Reviewed the Freestyle "Quick Start Guide"  and User Manual with patient and she has a written copy for home use.  The following topics were reviewed with patient today at visit    -Reviewed with patient that sensor glucose is measuring the glucose surrounding the cells and is not the same as blood glucose. Blood glucose and sensor glucose will be close but will rarely match exactly, but that is expected.      -Knowing the direction and speed of your glucose changes will be more useful than focusing on individual BG or sensor readings.    -No fingerstick glucose readings (calibration) are required for use of Freestyle Chris CGMS system, however if patient's signs and symptoms do not match the CGMS reading, please confirm with a fingerstick glucose.  Also, it is recommended hypoglycemia is confirmed with fingerstick glucose readings.      -Sensor is 14-day wear and FDA has approved for use on the back of the upper arm. It is recommended to rotate the Chris sensor placement sites with each sensor change.      -High doses (> 650 mg) of salicylic acid (aspirin) may falsely lower sensor readings.     -Taking more than 500 mg of Vitamin C (ascorbic acid) daily may falsely raise sensor readings.      --Once new sensor is placed, there is a 1 hour (60 minutes) warm up period.     -Chris 3:  Scan sensor to start 14-day sensor.  Sensor readings will be sent to [employer]/Chris 3 mobile martín every 5 minutes automatically, no need to scan sensor.    --Chris sensors are water resistant and can be worn while bathing, " "showering, or swimming.       Assisted patient with setting up Freestyle Chris account.  Once patient sets up personal Freestyle Chris account, patient can choose to share data with healthcare team (LibreView) or with family and friends (LibrBruce).       With the Freestyle Chris 3 system, the following alerts have been set for patient:    High glucose alert: 300  Low glucose alert: 70  Urgent low glucose alert: 55 mg/dL (preset by )  Signal loss alarm:  off    Discussed sensor error or sensor falls/knocked off prior in less than 14 days. Discussed pharmacy limitations with refilling Rx for Chris sensors. Contact Customer Service if you receive a "Replace Sensor "message before the end of the 14 day wear period. Freestyle Chris Customer Care Team is available at [phone] (7 Days a Week from 8AM to 8PM Eastern Standard Time).     Username: [email or web address]  Password: [email or web address]       Task: Provided patient with a meter today and sent Rx request to provider to send to patients pharmacy.         Task: Reviewed the importance of self-monitoring blood glucose and keeping logs.         Task: Instructed on how to self-monitor blood glucose using a home glucometer, how to properly dispose of used strips and lancets after use, and how to appropriately store meter and supplies.         Task: Provided patient with blood glucose logs, reviewed appropriate timing and frequency to SMBG, education on parameters on when to notify provider and advised patient to bring logs to all appts with PCP/Endocrinologist/Diabetes Care Specialist.         Task: Patient will upgrade to Chris 3 as soon as she finishes last Chris 14 day sensor. Completed 9/28/2023          Follow Up Plan     Follow up in about 5 weeks (around 11/2/2023).  Patient requested same day follow up as JOSE Machuca NP instead of the recommended alternating appointments at least every other month.  Reviewed medication changes made in today's " appt with JOSE Machuca.  Recommended diet changes to decrease carbohydrate intake.  Recommended protein shake as base for coffee.  Set up Chris 3 account since she was provided with prescription for Chris 3 and provided a sample Chris 3 sensor.  Sensor was not placed today because she has current Chris 14 day sensor in use.    Today's care plan and follow up schedule was discussed with patient.  Yarely verbalized understanding of the care plan, goals, and agrees to follow up plan.        The patient was encouraged to communicate with his/her health care provider/physician and care team regarding his/her condition(s) and treatment.  I provided the patient with my contact information today and encouraged to contact me via phone or [facility] Patient Portal as needed.     Length of Visit   Total Time: 40 Minutes

## 2023-09-28 NOTE — TELEPHONE ENCOUNTER
Called pt to relay message from Elida Machuca regarding Mounjaro refill. 7.5 mg Mounjaro sent to [facility]. Continue 7.5 for another month, and can increase with next refill if in stock. Pt understood instructions and did not have any further questions.   NO acute st change

## 2023-09-28 NOTE — PROGRESS NOTES
"Yarely Thompson is a 53 y.o. female who presents for a follow up evaluation of Type 2 diabetes mellitus.     CHIEF COMPLAINT: Diabetes Consultation    PCP: Mikey Ayon MD      Initial visit with me - 6/26/23    The patient was initially diagnosed with diabetes 8 years ago.      Previous failed treatments include:  Metformin - GI upset.     Social Documentation:  Patient lives in [place] with .   Occupation: Resident  at [employer].   Exercise: no formal exercise program.   "I do not watch what I eat"      Diabetes related complications:   nephropathy.   denies Pancreatitis  denies Gastroparesis  denies DKA  denies Hx/family Hx of MEN2/MTC  reports Frequent UTIs/yeast infections     Cardiovascular Risk Factors: family history of premature cardiovascular disease, hypertension, microalbuminuria, obesity (BMI >30 kg/m2), and sedentary lifestyle.     Diabetes Medications               insulin (LANTUS SOLOSTAR U-100 INSULIN) glargine 100 units/mL SubQ pen Inject 36 Units into the skin once daily.    insulin aspart U-100 (NOVOLOG FLEXPEN U-100 INSULIN) 100 unit/mL (3 mL) InPn pen Inject 20 Units into the skin 3 (three) times daily before meals. +scale.<200=+0u, 201-250=+2u,251-300=+4u, 301-350=+6u, >350=+8u. Max 84 units/day    tirzepatide 10 mg/0.5 mL PnIj Inject 10 mg into the skin every 7 days.     Current monitoring regimen:  Chris 14 Day CGM      The patient's [employer] CGM was downloaded and reviewed. For the past 14 days, patient average glucose was 199 mg/dL. She was above range 53% of the time, in range 46% of the time, and below range 1% of the time. The target range for this patient was 70 - 180 mg/dL. Overall, there was a pattern of post prandial hyperglycemia.      Recent hypoglycemic episodes: yes  Patient compliant with glucose checks and medication administration? Yes    DIABETES MANAGEMENT STATUS  Statin: Taking  ACE/ARB: Taking  Screening or Prevention Patient's " "value Goal Complete/Controlled?   HgA1C Testing and Control   Lab Results   Component Value Date    HGBA1C 9.0 (H) 09/14/2023      Annually/Less than 8% No   Lipid profile : 06/09/2023 Annually Yes   LDL control Lab Results   Component Value Date    LDLCALC Invalid, Trig>400.0 06/09/2023    Annually/Less than 100 mg/dl  Yes   Nephropathy screening Lab Results   Component Value Date    LABMICR 2294.0 06/09/2023     No results found for: "PROTEINUA"  No results found for: "UTPCR"   Annually Yes   Blood pressure BP Readings from Last 1 Encounters:   09/28/23 110/70    Less than 140/90 Yes   Dilated retinal exam : 04/14/2023 Annually Yes   Foot exam   : 08/04/2023 Annually No   Patient's medications, allergies, surgical, social and family histories were reviewed and updated as appropriate.     Review of Systems   Constitutional:  Negative for weight loss.   Eyes:  Negative for blurred vision and double vision.   Cardiovascular:  Negative for chest pain.   Gastrointestinal:  Negative for nausea and vomiting.   Genitourinary:  Negative for frequency.   Musculoskeletal:  Negative for falls.   Neurological:  Negative for dizziness and weakness.   Endo/Heme/Allergies:  Negative for polydipsia.   Psychiatric/Behavioral:  Negative for depression.    All other systems reviewed and are negative.       Physical Exam  Constitutional:       Appearance: Normal appearance.   HENT:      Head: Normocephalic and atraumatic.   Pulmonary:      Effort: No respiratory distress.   Musculoskeletal:      Cervical back: Normal range of motion.   Neurological:      Mental Status: She is alert and oriented to person, place, and time.   Psychiatric:         Mood and Affect: Mood normal.         Behavior: Behavior normal.        Blood pressure 110/70, height 5' 5" (1.651 m), weight 78.8 kg (173 lb 11.2 oz).  Wt Readings from Last 3 Encounters:   09/28/23 78.8 kg (173 lb 11.2 oz)   09/20/23 78.9 kg (174 lb)   09/14/23 78.9 kg (174 lb)       LAB " REVIEW  Lab Results   Component Value Date     (L) 06/09/2023    K 4.2 06/09/2023     06/09/2023    CO2 24 06/09/2023    BUN 11 06/09/2023    CREATININE 0.8 06/09/2023    CALCIUM 9.6 06/09/2023    ANIONGAP 8 06/09/2023    EGFRNORACEVR >60.0 06/09/2023     Lab Results   Component Value Date    CPEPTIDE 4.45 12/22/2022     Hemoglobin A1C   Date Value Ref Range Status   09/14/2023 9.0 (H) 4.0 - 5.6 % Final     Comment:     ADA Screening Guidelines:  5.7-6.4%  Consistent with prediabetes  >or=6.5%  Consistent with diabetes    High levels of fetal hemoglobin interfere with the HbA1C  assay. Heterozygous hemoglobin variants (HbS, HgC, etc)do  not significantly interfere with this assay.   However, presence of multiple variants may affect accuracy.     06/09/2023 11.7 (H) 4.0 - 5.6 % Final     Comment:     ADA Screening Guidelines:  5.7-6.4%  Consistent with prediabetes  >or=6.5%  Consistent with diabetes    High levels of fetal hemoglobin interfere with the HbA1C  assay. Heterozygous hemoglobin variants (HbS, HgC, etc)do  not significantly interfere with this assay.   However, presence of multiple variants may affect accuracy.     12/09/2022 12.4 (H) 4.0 - 5.6 % Final     Comment:     ADA Screening Guidelines:  5.7-6.4%  Consistent with prediabetes  >or=6.5%  Consistent with diabetes    High levels of fetal hemoglobin interfere with the HbA1C  assay. Heterozygous hemoglobin variants (HbS, HgC, etc)do  not significantly interfere with this assay.   However, presence of multiple variants may affect accuracy.          ASSESSMENT    ICD-10-CM ICD-9-CM   1. Type 2 diabetes mellitus with microalbuminuria, with long-term current use of insulin  E11.29 250.40    R80.9 791.0    Z79.4 V58.67   2. Type 2 diabetes mellitus without complication, unspecified whether long term insulin use  E11.9 250.00         PLAN  Diagnoses and all orders for this visit:    Type 2 diabetes mellitus with microalbuminuria, with long-term  current use of insulin  -     POCT Glucose, Hand-Held Device  -     blood-glucose sensor (FREESTYLE VIRGILIO 3 SENSOR) Dana; 1 each by Misc.(Non-Drug; Combo Route) route once daily.    Type 2 diabetes mellitus without complication, unspecified whether long term insulin use          Reviewed pathophysiology of diabetes, complications related to the disease, importance of annual dilated eye exam and daily foot examination. Explained MOA, SE, dosage of medications. Written instructions given and reviewed with patient and patient verbalizes understanding.     Lunch: typically high carb    PATIENT INSTRUCTIONS     Lifestyle modification with diabetic diet and at least 30 minutes of physical activity daily recommended.  Diabetic Foot Exam deferred today due to virtual visit. Will plan to be done at next in-person visit.      Increase Mounjaro to 10 mg subcutaneously every 7 days.   Continue Lantus 36 units subcutaneously daily.   Decrease Novolog to 16 units subcutaneously three times daily with meals plus correction dosing.  Take 10-15 minutes before meal.   Bring insulin to work, take before lunch.      <200 = +0u  201-250 = +2u  251-300=+4u  301-350=+6u  >350=+8u    Continue Virgilio 2 CGM  Will send order for Virgilio 3 to your pharmacy.   Blood Sugar Goals:       Fastin-130.       1-2 hours after a meal: Less than 180.     Follow up in about 4 weeks (around 10/26/2023) for In-Person, Virgilio - Sharing.    Portions of this note were prepared with GOQii Naturally Speaking voice recognition transcription software. Grammatical errors, including garbled syntax, mangle pronouns, and other bizarre constructions may be attributed to that software system.

## 2023-09-28 NOTE — PATIENT INSTRUCTIONS
PATIENT INSTRUCTIONS     Lifestyle modification with diabetic diet and at least 30 minutes of physical activity daily recommended.  Diabetic Foot Exam deferred today due to virtual visit. Will plan to be done at next in-person visit.      Increase Mounjaro to 10 mg subcutaneously every 7 days.   Continue Lantus 36 units subcutaneously daily.   Decrease Novolog to 16 units subcutaneously three times daily with meals plus correction dosing.  Take 10-15 minutes before meal.   Bring insulin to work, take before lunch.      <200 = +0u  201-250 = +2u  251-300=+4u  301-350=+6u  >350=+8u    Continue Chris 2 CGM  Will send order for Chris 3 to your pharmacy.   Blood Sugar Goals:       Fastin-130.       1-2 hours after a meal: Less than 180.

## 2023-09-28 NOTE — TELEPHONE ENCOUNTER
----- Message from Nick BROWNLEE LPN sent at 9/28/2023 11:12 AM CDT -----  Contact: [phone]  Her pharmacy only has the 7.5mg and 12.5 mg Mounjaro. Please advise.   ----- Message -----  From: Tejas John  Sent: 9/28/2023  11:08 AM CDT  To: Brigette Marrero Staff     Patient is calling in regards to her Mounjaro medication. Pt stated that the pharmacy only has the  7.5 or 12.5. pt would like a new prescription called into the pharmacy.        Pt call back number is [phone].          [id number] - [facility] [address] AT [facility] [address] [facility] [address]  [address] [facility] [address]  Phone: [phone] Fax: [phone]         Thanks KB

## 2023-10-03 ENCOUNTER — CLINICAL SUPPORT (OUTPATIENT)
Dept: SMOKING CESSATION | Facility: CLINIC | Age: 53
End: 2023-10-03
Payer: COMMERCIAL

## 2023-10-03 DIAGNOSIS — F17.200 NICOTINE DEPENDENCE: Primary | ICD-10-CM

## 2023-10-03 PROCEDURE — 99999 PR PBB SHADOW E&M-EST. PATIENT-LVL II: ICD-10-PCS | Mod: PBBFAC,,,

## 2023-10-03 PROCEDURE — 99403 PREV MED CNSL INDIV APPRX 45: CPT | Mod: S$GLB,,,

## 2023-10-03 PROCEDURE — 99999 PR PBB SHADOW E&M-EST. PATIENT-LVL II: CPT | Mod: PBBFAC,,,

## 2023-10-03 PROCEDURE — 99403 PR PREVENT COUNSEL,INDIV,45 MIN: ICD-10-PCS | Mod: S$GLB,,,

## 2023-10-03 NOTE — PROGRESS NOTES
Individual Follow-Up Form    10/3/2023    Quit Date: tbd    Clinical Status of Patient: Outpatient    Length of Service: 45 minutes    Continuing Medication: yes  Chantix, Patches, or Nicotine Lozenges    Other Medications: none     Target Symptoms: Withdrawal and medication side effects. The following were  rated moderate (3) to severe (4) on TCRS:  Moderate (3): none  Severe (4): none    Comments: The patient was seen in the clinic for smoking cessation follow up.  She states she smokes 20 cigarettes per day down from 40. Commended her for this reduction.  Per Smokerlyzer CO 15 ppm, last smoked 2 hours prior to meeting.  She was quit for 8 months for her last quit attempt. The patient remains on the prescribed tobacco cessation medication regimen of 1 mg Chantix BID, 21 mg patches (doubled) and 2 mg lozenges prn without any negative side effects at this time. Session Focus:  orientation, client introductions, completion of TCRS (Tobacco Cessation Rating Scale) learned addiction model, cues/triggers, personal reasons for quitting, medications, goals, quit date discussed but not set yet. GOALS: 1. Eliminate one cigarette per day from her pack.  2. New hobbies.  The patient denies any abnormal behavioral or mental changes at this time. The patient will continue with  therapy sessions and medication monitoring by CTTS. Prescribed medication management will be by physician.    Diagnosis: F17.210    Next Visit: 2 weeks

## 2023-10-03 NOTE — Clinical Note
The patient was seen in the clinic for smoking cessation follow up.  She states she smokes 20 cigarettes per day down from 40. Commended her for this reduction.  Per Smokerlyzer CO 15 ppm, last smoked 2 hours prior to meeting.  She was quit for 8 months for her last quit attempt. The patient remains on the prescribed tobacco cessation medication regimen of 1 mg Chantix BID, 21 mg patches (doubled) and 2 mg lozenges prn without any negative side effects at this time. Session Focus:  orientation, client introductions, completion of TCRS (Tobacco Cessation Rating Scale) learned addiction model, cues/triggers, personal reasons for quitting, medications, goals, quit date discussed but not set yet. GOALS: 1. Eliminate one cigarette per day from her pack.  2. New hobbies.  The patient denies any abnormal behavioral or mental changes at this time. The patient will continue with  therapy sessions and medication monitoring by CTTS. Prescribed medication management will be by physician.

## 2023-10-09 DIAGNOSIS — F17.200 NICOTINE DEPENDENCE: Primary | ICD-10-CM

## 2023-10-09 RX ORDER — VARENICLINE TARTRATE 1 MG/1
1 TABLET, FILM COATED ORAL 2 TIMES DAILY
Qty: 56 TABLET | Refills: 0 | Status: SHIPPED | OUTPATIENT
Start: 2023-10-09 | End: 2023-11-08 | Stop reason: SDUPTHER

## 2023-10-09 NOTE — PROGRESS NOTES
Received a message that the patient was almost out of Chantix and requested a refill that would be needed before her next appointment. Ordered the medications and informed the patient.

## 2023-10-18 ENCOUNTER — CLINICAL SUPPORT (OUTPATIENT)
Dept: SMOKING CESSATION | Facility: CLINIC | Age: 53
End: 2023-10-18
Payer: COMMERCIAL

## 2023-10-18 DIAGNOSIS — F17.200 NICOTINE DEPENDENCE: Primary | ICD-10-CM

## 2023-10-18 PROCEDURE — 99403 PR PREVENT COUNSEL,INDIV,45 MIN: ICD-10-PCS | Mod: S$GLB,,,

## 2023-10-18 PROCEDURE — 99999 PR PBB SHADOW E&M-EST. PATIENT-LVL II: CPT | Mod: PBBFAC,,,

## 2023-10-18 PROCEDURE — 99403 PREV MED CNSL INDIV APPRX 45: CPT | Mod: S$GLB,,,

## 2023-10-18 PROCEDURE — 99999 PR PBB SHADOW E&M-EST. PATIENT-LVL II: ICD-10-PCS | Mod: PBBFAC,,,

## 2023-10-18 RX ORDER — IBUPROFEN 200 MG
2 TABLET ORAL DAILY
Qty: 56 PATCH | Refills: 0 | Status: SHIPPED | OUTPATIENT
Start: 2023-10-18 | End: 2023-11-08 | Stop reason: SDUPTHER

## 2023-10-18 RX ORDER — DM/P-EPHED/ACETAMINOPH/DOXYLAM 30-7.5/3
2 LIQUID (ML) ORAL
Qty: 108 LOZENGE | Refills: 0 | Status: SHIPPED | OUTPATIENT
Start: 2023-10-18 | End: 2023-10-18

## 2023-10-18 RX ORDER — NICOTINE POLACRILEX 2 MG/1
2 LOZENGE ORAL
Qty: 81 EACH | Refills: 0 | Status: SHIPPED | OUTPATIENT
Start: 2023-10-18 | End: 2023-11-08 | Stop reason: SDUPTHER

## 2023-10-18 NOTE — Clinical Note
The patient was seen in the clinic for smoking cessation follow up.  She states she smokes 20 cigarettes per day down from . Per Smokerlyzer CO 18  ppm, last smoked 2 hours prior to meeting.  The patient remains on the prescribed tobacco cessation medication regimen of 1 mg Chantix, 21 mg patches and 2 mg lozenges without any negative side effects at this time. Session Focus:   orientation, client introductions, completion of TCRS (Tobacco Cessation Rating Scale) learned addiction model, cues/triggers, personal reasons for quitting, medications, goals, quit date discussed but not set yet.  GOALS: 1. Goal number 18.  2. Move cig pack when at work. The patient denies any abnormal behavioral or mental changes at this time. The patient will continue with  therapy sessions and medication monitoring by CTTS. Prescribed medication management will be by physician.

## 2023-10-24 DIAGNOSIS — E11.29 TYPE 2 DIABETES MELLITUS WITH MICROALBUMINURIA, WITH LONG-TERM CURRENT USE OF INSULIN: ICD-10-CM

## 2023-10-24 DIAGNOSIS — R80.9 TYPE 2 DIABETES MELLITUS WITH MICROALBUMINURIA, WITH LONG-TERM CURRENT USE OF INSULIN: ICD-10-CM

## 2023-10-24 DIAGNOSIS — Z79.4 TYPE 2 DIABETES MELLITUS WITH MICROALBUMINURIA, WITH LONG-TERM CURRENT USE OF INSULIN: ICD-10-CM

## 2023-10-24 NOTE — TELEPHONE ENCOUNTER
Called pt to ask if she wanted the 10 mg Mounjaro refill instead of the 7.5 mg refill, as the dose was supposed to increase with next refill. Pt stated that they had called multiple pharmacies and none of them had the 10 mg Mounjaro in stock, which is why pt has requested the 7.5 mg Mounjaro again.

## 2023-10-25 RX ORDER — TIRZEPATIDE 7.5 MG/.5ML
7.5 INJECTION, SOLUTION SUBCUTANEOUS
Qty: 4 PEN | Refills: 2 | Status: SHIPPED | OUTPATIENT
Start: 2023-10-25 | End: 2023-11-02 | Stop reason: DRUGHIGH

## 2023-11-01 ENCOUNTER — CLINICAL SUPPORT (OUTPATIENT)
Dept: SMOKING CESSATION | Facility: CLINIC | Age: 53
End: 2023-11-01
Payer: COMMERCIAL

## 2023-11-01 DIAGNOSIS — F17.200 NICOTINE DEPENDENCE: Primary | ICD-10-CM

## 2023-11-01 PROCEDURE — 99999 PR PBB SHADOW E&M-EST. PATIENT-LVL II: ICD-10-PCS | Mod: PBBFAC,,,

## 2023-11-01 PROCEDURE — 99999 PR PBB SHADOW E&M-EST. PATIENT-LVL II: CPT | Mod: PBBFAC,,,

## 2023-11-01 PROCEDURE — 99403 PREV MED CNSL INDIV APPRX 45: CPT | Mod: S$GLB,,,

## 2023-11-01 PROCEDURE — 99403 PR PREVENT COUNSEL,INDIV,45 MIN: ICD-10-PCS | Mod: S$GLB,,,

## 2023-11-01 NOTE — Clinical Note
The patient was seen in the clinic for smoking cessation follow up.  She states she smokes 10-12 cigarettes per day, down from 40, commended her for this. The patient remains on the prescribed tobacco cessation medication regimen of 21 mg patches (doubled), Chantix Starter does and 21 mg gum prn without any negative side effects at this time. Session Focus:  completion of TCRS (Tobacco Cessation Rating Scale) reviewed strategies, cues, and triggers. Introduced the negative impact of tobacco on health, the health advantages of discontinuing the use of tobacco, time line improved health changes after a quit, withdrawal issues to expect from nicotine and habit, and ways to achieve the goal of a quit.  GOALS: 1. Goal number of 10 and ration out (5 in am and 5 in pm) 2. New hobbies explored.  The patient denies any abnormal behavioral or mental changes at this time. The patient will continue with  therapy sessions and medication monitoring by CTTS. Prescribed medication management will be by physician.

## 2023-11-01 NOTE — PROGRESS NOTES
Addendum created to correct the 2 mg gum.    Individual Follow-Up Form    11/1/2023    Quit Date: tbd    Clinical Status of Patient: Outpatient    Length of Service: 45 minutes    Continuing Medication: yes  Chantix, Patches, or Nicotine Lozenges    Other Medications: none     Target Symptoms: Withdrawal and medication side effects. The following were  rated moderate (3) to severe (4) on TCRS:  Moderate (3): none  Severe (4): none    Comments: The patient was seen in the clinic for smoking cessation follow up.  She states she smokes 10-12 cigarettes per day, down from 40, commended her for this. The patient remains on the prescribed tobacco cessation medication regimen of 21 mg patches (doubled), Chantix Starter does and 2 mg gum prn without any negative side effects at this time. Session Focus:  completion of TCRS (Tobacco Cessation Rating Scale) reviewed strategies, cues, and triggers. Introduced the negative impact of tobacco on health, the health advantages of discontinuing the use of tobacco, time line improved health changes after a quit, withdrawal issues to expect from nicotine and habit, and ways to achieve the goal of a quit.  GOALS: 1. Goal number of 10 and ration out (5 in am and 5 in pm) 2. New hobbies explored.  The patient denies any abnormal behavioral or mental changes at this time. The patient will continue with  therapy sessions and medication monitoring by CTTS. Prescribed medication management will be by physician.      Diagnosis: F17.210    Next Visit: 2 weeks

## 2023-11-02 ENCOUNTER — NUTRITION (OUTPATIENT)
Dept: DIABETES | Facility: CLINIC | Age: 53
End: 2023-11-02
Payer: COMMERCIAL

## 2023-11-02 ENCOUNTER — OFFICE VISIT (OUTPATIENT)
Dept: DIABETES | Facility: CLINIC | Age: 53
End: 2023-11-02
Payer: COMMERCIAL

## 2023-11-02 VITALS — WEIGHT: 172.81 LBS | HEIGHT: 65 IN | BODY MASS INDEX: 28.79 KG/M2

## 2023-11-02 VITALS
WEIGHT: 172.81 LBS | BODY MASS INDEX: 28.79 KG/M2 | DIASTOLIC BLOOD PRESSURE: 70 MMHG | HEIGHT: 65 IN | SYSTOLIC BLOOD PRESSURE: 100 MMHG

## 2023-11-02 DIAGNOSIS — E78.5 HYPERLIPIDEMIA ASSOCIATED WITH TYPE 2 DIABETES MELLITUS: ICD-10-CM

## 2023-11-02 DIAGNOSIS — I15.2 HYPERTENSION ASSOCIATED WITH DIABETES: ICD-10-CM

## 2023-11-02 DIAGNOSIS — R80.9 TYPE 2 DIABETES MELLITUS WITH MICROALBUMINURIA, WITH LONG-TERM CURRENT USE OF INSULIN: Primary | ICD-10-CM

## 2023-11-02 DIAGNOSIS — E11.9 TYPE 2 DIABETES MELLITUS WITHOUT COMPLICATION, UNSPECIFIED WHETHER LONG TERM INSULIN USE: Primary | ICD-10-CM

## 2023-11-02 DIAGNOSIS — E11.59 HYPERTENSION ASSOCIATED WITH DIABETES: ICD-10-CM

## 2023-11-02 DIAGNOSIS — E11.29 TYPE 2 DIABETES MELLITUS WITH MICROALBUMINURIA, WITH LONG-TERM CURRENT USE OF INSULIN: Primary | ICD-10-CM

## 2023-11-02 DIAGNOSIS — Z79.4 TYPE 2 DIABETES MELLITUS WITH MICROALBUMINURIA, WITH LONG-TERM CURRENT USE OF INSULIN: Primary | ICD-10-CM

## 2023-11-02 DIAGNOSIS — E11.69 HYPERLIPIDEMIA ASSOCIATED WITH TYPE 2 DIABETES MELLITUS: ICD-10-CM

## 2023-11-02 DIAGNOSIS — R80.9 TYPE 2 DIABETES MELLITUS WITH MICROALBUMINURIA, WITH LONG-TERM CURRENT USE OF INSULIN: ICD-10-CM

## 2023-11-02 DIAGNOSIS — Z79.4 TYPE 2 DIABETES MELLITUS WITH MICROALBUMINURIA, WITH LONG-TERM CURRENT USE OF INSULIN: ICD-10-CM

## 2023-11-02 DIAGNOSIS — E11.29 TYPE 2 DIABETES MELLITUS WITH MICROALBUMINURIA, WITH LONG-TERM CURRENT USE OF INSULIN: ICD-10-CM

## 2023-11-02 PROCEDURE — 4010F ACE/ARB THERAPY RXD/TAKEN: CPT | Mod: CPTII,S$GLB,, | Performed by: NURSE PRACTITIONER

## 2023-11-02 PROCEDURE — 3052F PR MOST RECENT HEMOGLOBIN A1C LEVEL 8.0 - < 9.0%: ICD-10-PCS | Mod: CPTII,S$GLB,, | Performed by: NURSE PRACTITIONER

## 2023-11-02 PROCEDURE — 3074F SYST BP LT 130 MM HG: CPT | Mod: CPTII,S$GLB,, | Performed by: NURSE PRACTITIONER

## 2023-11-02 PROCEDURE — 3066F NEPHROPATHY DOC TX: CPT | Mod: CPTII,S$GLB,, | Performed by: NURSE PRACTITIONER

## 2023-11-02 PROCEDURE — 3066F PR DOCUMENTATION OF TREATMENT FOR NEPHROPATHY: ICD-10-PCS | Mod: CPTII,S$GLB,, | Performed by: NURSE PRACTITIONER

## 2023-11-02 PROCEDURE — 4010F PR ACE/ARB THEARPY RXD/TAKEN: ICD-10-PCS | Mod: CPTII,S$GLB,, | Performed by: NURSE PRACTITIONER

## 2023-11-02 PROCEDURE — 3008F PR BODY MASS INDEX (BMI) DOCUMENTED: ICD-10-PCS | Mod: CPTII,S$GLB,, | Performed by: NURSE PRACTITIONER

## 2023-11-02 PROCEDURE — 3008F BODY MASS INDEX DOCD: CPT | Mod: CPTII,S$GLB,, | Performed by: NURSE PRACTITIONER

## 2023-11-02 PROCEDURE — 95251 PR GLUCOSE MONITOR, 72 HOUR, PHYS INTERP: ICD-10-PCS | Mod: S$GLB,,, | Performed by: NURSE PRACTITIONER

## 2023-11-02 PROCEDURE — 3052F HG A1C>EQUAL 8.0%<EQUAL 9.0%: CPT | Mod: CPTII,S$GLB,, | Performed by: NURSE PRACTITIONER

## 2023-11-02 PROCEDURE — 3078F DIAST BP <80 MM HG: CPT | Mod: CPTII,S$GLB,, | Performed by: NURSE PRACTITIONER

## 2023-11-02 PROCEDURE — 99999 PR PBB SHADOW E&M-EST. PATIENT-LVL III: CPT | Mod: PBBFAC,,, | Performed by: NURSE PRACTITIONER

## 2023-11-02 PROCEDURE — 99214 OFFICE O/P EST MOD 30 MIN: CPT | Mod: S$GLB,,, | Performed by: NURSE PRACTITIONER

## 2023-11-02 PROCEDURE — G0108 PR DIAB MANAGE TRN  PER INDIV: ICD-10-PCS | Mod: S$GLB,,, | Performed by: DIETITIAN, REGISTERED

## 2023-11-02 PROCEDURE — 95251 CONT GLUC MNTR ANALYSIS I&R: CPT | Mod: S$GLB,,, | Performed by: NURSE PRACTITIONER

## 2023-11-02 PROCEDURE — 1159F PR MEDICATION LIST DOCUMENTED IN MEDICAL RECORD: ICD-10-PCS | Mod: CPTII,S$GLB,, | Performed by: NURSE PRACTITIONER

## 2023-11-02 PROCEDURE — 3062F PR POS MACROALBUMINURIA RESULT DOCUMENTED/REVIEW: ICD-10-PCS | Mod: CPTII,S$GLB,, | Performed by: NURSE PRACTITIONER

## 2023-11-02 PROCEDURE — G0108 DIAB MANAGE TRN  PER INDIV: HCPCS | Mod: S$GLB,,, | Performed by: DIETITIAN, REGISTERED

## 2023-11-02 PROCEDURE — 99999 PR PBB SHADOW E&M-EST. PATIENT-LVL II: CPT | Mod: PBBFAC,,, | Performed by: DIETITIAN, REGISTERED

## 2023-11-02 PROCEDURE — 99999 PR PBB SHADOW E&M-EST. PATIENT-LVL II: ICD-10-PCS | Mod: PBBFAC,,, | Performed by: DIETITIAN, REGISTERED

## 2023-11-02 PROCEDURE — 3078F PR MOST RECENT DIASTOLIC BLOOD PRESSURE < 80 MM HG: ICD-10-PCS | Mod: CPTII,S$GLB,, | Performed by: NURSE PRACTITIONER

## 2023-11-02 PROCEDURE — 1159F MED LIST DOCD IN RCRD: CPT | Mod: CPTII,S$GLB,, | Performed by: NURSE PRACTITIONER

## 2023-11-02 PROCEDURE — 3074F PR MOST RECENT SYSTOLIC BLOOD PRESSURE < 130 MM HG: ICD-10-PCS | Mod: CPTII,S$GLB,, | Performed by: NURSE PRACTITIONER

## 2023-11-02 PROCEDURE — 99214 PR OFFICE/OUTPT VISIT, EST, LEVL IV, 30-39 MIN: ICD-10-PCS | Mod: S$GLB,,, | Performed by: NURSE PRACTITIONER

## 2023-11-02 PROCEDURE — 3062F POS MACROALBUMINURIA REV: CPT | Mod: CPTII,S$GLB,, | Performed by: NURSE PRACTITIONER

## 2023-11-02 PROCEDURE — 99999 PR PBB SHADOW E&M-EST. PATIENT-LVL III: ICD-10-PCS | Mod: PBBFAC,,, | Performed by: NURSE PRACTITIONER

## 2023-11-02 NOTE — PROGRESS NOTES
"Diabetes Care Specialist Progress Note  Author: Carmina De Los Santos RD  Date: 11/2/2023    Program Intake  Reason for Diabetes Program Visit:: Intervention  Type of Intervention:: Individual  Individual: Education  Education: Nutrition and Meal Planning  Current diabetes risk level:: moderate  In the last 12 months, have you:: used emergency room services  Was the ER or hospital admission related to diabetes?: No  Permission to speak with others about care:: no    Lab Results   Component Value Date    HGBA1C 9.0 (H) 09/14/2023       Clinical  Weight: 78.4 kg (172 lb 13.5 oz)   Height: 5' 5" (165.1 cm)   Body mass index is 28.76 kg/m².    Clinical Assessment  Current Diabetes Treatment: Injectable, Insulin (Lantus 42 units, Mounjaro 10 mg/week, Novolog 200-250 mg/dl 16 units, 251-300 mg/dl 18 units, 301-350 mg/dl 20 units, 351-400 mg/dl 22 units)  In the last month, how often have you experienced high blood sugar?: once a day  Have you ever been hospitalized because your blood sugar was high?: no  Nutritional Status  Diet: Regular  Meal Plan 24 Hour Recall: Breakfast  Meal Plan 24 Hour Recall - Breakfast: [employer] Ice coffee (46 g sugar) + breakfast burritio  Meal Plan 24 Hour Recall - Lunch: beans and rice  Meal Plan 24 Hour Recall - Dinner: pizza, beef soup with beef, vegetables, potato, and pasta  Meal Plan 24 Hour Recall - Snack: "lots of candy"  Change in appetite?: No  Recent Changes in Weight: No Recent Weight Change  Current nutritional status an area of need that is impacting patient's ability to self-manage diabetes?: No    Additional Social History  Cognitive/Behavioral Health  Alert and Oriented: Yes  Difficulty Thinking: No  Requires Prompting: No  Requires assistance for routine expression?: No  Cognitive or behavioral barriers impacting ability to self-manage diabetes?: Yes (Eats candy and other snacks in large quantities)    Diabetes Self-Management Skills Assessment  Nutrition/Healthy " Eating  Challenges to healthy eating:: snacking between meals and at night, portion control, lack of will power, eating out, going to parties  Method of carbohydrate measurement:: no method  Patient can identify foods that impact blood sugar.: yes  Patient-identified foods:: soda, sweets, starchy vegetables (corn, peas, beans), starches (bread, pasta, rice, cereal)  Nutrition/Healthy Eating Skills Assessment Completed:: Yes  Assessment indicates:: Instruction Needed  Area of need?: Yes    Medications  Patient is able to describe current diabetes management routine.: yes  Diabetes management routine:: injectable medications, insulin  Patient is able to identify current diabetes medications, dosages, and appropriate timing of medications.: no  Patient understands the purpose of the medications taken for diabetes.: yes  Patient reports problems or concerns with current medication regimen.: no  Medication Skills Assessment Completed:: Yes  Assessment indicates:: Instruction Needed  Area of need?: Yes    Home Blood Glucose Monitoring  Patient states that blood sugar is checked at home daily.: yes  Monitoring Method:: personal continuous glucose monitor  Personal CGM type:: Chris 3 using Chris 3 martín  Patient is able to use personal CGM appropriately.: yes  CGM Report reviewed?: yes  Home Blood Glucose Monitoring Skills Assessment Completed: : Yes  Assessment indicates:: Adequate understanding  Area of need?: No  [employer] download (10/20/23 to 11/2/23): See media file for details. Only about 20% glucoses are usually in target range. Global hyperglycemia..  Zero episodes of hypoglycemia noted in the [employer] report.    Sensor usage: 86%  Average glucose: 218mg/dL    26% CGM readings greater than 250 mg/dL  47% CGM readings between 181-250 mg/dL  21% CGM readings in target glucose range of  mg/dL   0% CGM readings between 54-79%  0% CGM readings less than 54 mg/dL        Chronic Complications  Patient can identify  major chronic complications of diabetes.: yes  Stated chronic complications:: heart disease/heart attack  Patient can identify ways to prevent or delay diabetes complications.: yes  Stated ways to prevent complications:: healthy eating and regular activity, maintaining optimal blood glucose control  Patient is aware that having diabetes increases risk of heart disease?: Yes  Patient is aware that heart disease is the leading cause of death and disability in people with diabetes?: No  Patient able to state risk factors for heart disease?: Yes  Patient stated risk factors for heart disease:: High blood pressure, High cholesterol  Patient is taking statin?: Yes (Crestor 40 mg)  Chronic Complications Skills Assessment Completed: : Yes  Assessment indicates:: Adequate understanding  Area of need?: No    Assessment Summary and Plan    Based on today's diabetes care assessment, the following areas of need were identified:          11/2/2023    12:02 AM   Social   Cognitive/Behavioral Health Yes            11/2/2023    12:02 AM   Clinical   Nutritional Status No            11/2/2023    12:02 AM   Diabetes Self-Management Skills   Nutrition/Healthy Eating Yes, see care plan   Medication Yes, see care plan   Home Blood Glucose Monitoring No   Chronic Complications No          Today's interventions were provided through individual discussion, instruction, and written materials were provided.      Patient verbalized understanding of instruction and written materials.  Pt was able to return back demonstration of instructions today. Patient understood key points, needs reinforcement and further instruction.     Diabetes Self-Management Care Plan:    Today's Diabetes Self-Management Care Plan was developed with Yarely's input. Yarely has agreed to work toward the following goal(s) to improve his/her overall diabetes control.      Care Plan: Diabetes Management   Updates made since 10/3/2023 12:00 AM        Problem: Medications   "       Goal: Patient Agrees to take 42 units of Lantus (mornings), Novolog 200-250 mg/dl 16 units, 251-300 mg/dl 18 units, 301-350 mg/dl 20 units, 351-400 mg/dl 22 units, Mounjaro 10 mg (Fridays)    Start Date: 8/3/2023   Expected End Date: 12/14/2023   Priority: Medium   Barriers: No Barriers Identified   Note:    Take Novolog before meals.  Patient reports she brings to work in a cooler.       Problem: Healthy Eating         Goal: Follow Plate Method.  Decrease candy intake to 2 Reeses cups, 1 fun size pack, or 1 serving listed on label after dinner and one after lunch. Pair with protein.    Start Date: 8/3/2023   Expected End Date: 12/14/2023   Priority: High   Barriers: Lack of Motivation to Change   Note:    11/2/23: Patient reports she is eating a lot of candy because "people in the office give it to me."  She reports "it is all gone now."  Reviewed again importance of changing coffee to PJ sugar free or could be protein shake with coffee.  Discussed she is having two many carbohydrates at breakfast.  Discussed the need to balance carbohydrates like potatoes with protein to slow spike of blood sugar.  Discussed the need to do this at snacks as well.  Reviewed portion size of carbohydrates as well. She drinks 6 bottles of water, Diet Coke, and coffee.  9/28/23:  Patient reports she is consuming less candy.  Today we reviewed the need to have protein paired with carbohydrate at each meal.  Discussed choosing a protein shake as a base for iced coffee made at home.  Continue to eat breakfast burrito daily.  Try protein shake or sugar free cream for coffee at night.    8/3/23: Explained patient can skip side dish at lunch and have additional Henrik cup.  Added Henrik cup to the list of carbohydrate servings in diabetes management booklet.  Patient ate candy daily after school with her father so this is part of a memory and dietary habit.  Patient more open to moderation.  Drinks diet drinks and water.  She does not " ""care for fruit."         Problem: Blood Glucose Self-Monitoring         Long-Range Goal: Patient agrees to finish using last Chris 14 day sensor.  Place Chris 3 sensor and scan to start it. Completed 11/2/2023   Start Date: 9/28/2023   Expected End Date: 11/2/2023   Priority: Medium   Barriers: No Barriers Identified   Note:    Patient is connected to clinic via clinic code, but her name last name is spelled incorrectly as Yarely Mcdaniel.    UPGRADE/EDUCATION OF FREESTYLE CHRIS 3 PERSONAL CONTINOUS GLUCOSE MONITORING SYSTEM (CGMS)     REFERRING PROVIDER: Elida Machuca NP     Patient is here in clinic today for placement of personal continuous glucose monitoring system (CGMS).   Patient has Freestyle Chris 3 Freestyle Chris 3 Sensor sample and prescription. Patient will use iphone for scanning to obtain glucose readings.  A detailed explanation of Freestyle Chris Continuous Glucose Monitoring System was provided. Reviewed the Freestyle "Quick Start Guide"  and User Manual with patient and she has a written copy for home use.  The following topics were reviewed with patient today at visit    -Reviewed with patient that sensor glucose is measuring the glucose surrounding the cells and is not the same as blood glucose. Blood glucose and sensor glucose will be close but will rarely match exactly, but that is expected.      -Knowing the direction and speed of your glucose changes will be more useful than focusing on individual BG or sensor readings.    -No fingerstick glucose readings (calibration) are required for use of Freestyle Chris CGMS system, however if patient's signs and symptoms do not match the CGMS reading, please confirm with a fingerstick glucose.  Also, it is recommended hypoglycemia is confirmed with fingerstick glucose readings.      -Sensor is 14-day wear and FDA has approved for use on the back of the upper arm. It is recommended to rotate the Chris sensor placement sites with each sensor change.  " "    -High doses (> 650 mg) of salicylic acid (aspirin) may falsely lower sensor readings.     -Taking more than 500 mg of Vitamin C (ascorbic acid) daily may falsely raise sensor readings.      --Once new sensor is placed, there is a 1 hour (60 minutes) warm up period.     -Chris 3:  Scan sensor to start 14-day sensor.  Sensor readings will be sent to [employer] mobile martín every 5 minutes automatically, no need to scan sensor.    --Chris sensors are water resistant and can be worn while bathing, showering, or swimming.       Assisted patient with setting up Freestyle Chris account.  Once patient sets up personal Freestyle Chris account, patient can choose to share data with healthcare team ([email or web address]) or with family and friends ([email or web address]).       With the Freestyle Chris 3 system, the following alerts have been set for patient:    High glucose alert: 300  Low glucose alert: 70  Urgent low glucose alert: 55 mg/dL (preset by )  Signal loss alarm:  off    Discussed sensor error or sensor falls/knocked off prior in less than 14 days. Discussed pharmacy limitations with refilling Rx for Chris sensors. Contact Customer Service if you receive a "Replace Sensor "message before the end of the 14 day wear period. [employer] Customer Care Team is available at [phone] (7 Days a Week from 8AM to 8PM Eastern Standard Time).     Username: [email or web address]  Password: [email or web address]         Follow Up Plan   Follow up if symptoms worsen or fail to improve.  Patient has follow up scheduled with JOSE Machuca NP on 12/14/23.  Reinforced all same topics as we did at last visit.  Take Novolog before meals because this can eventually contribute to hypoglycemic episodes.  Reviewed Rule of 15/15.  Taught S/S of hyperglycemia and patient is experiencing increased thirst.  Discussed swaps for coffee and the need to decrease candy intake to the smaller portion sizes we discussed before. Always pair protein " with carbohydrate.    Today's care plan and follow up schedule was discussed with patient.  Yarely verbalized understanding of the care plan, goals, and agrees to follow up plan.        The patient was encouraged to communicate with his/her health care provider/physician and care team regarding his/her condition(s) and treatment.  I provided the patient with my contact information today and encouraged to contact me via phone or [facility] Patient Portal as needed.     Length of Visit   Total Time: 30 Minutes

## 2023-11-02 NOTE — PROGRESS NOTES
"Yarely Thompson is a 53 y.o. female who presents for a follow up evaluation of Type 2 diabetes mellitus.     CHIEF COMPLAINT: Diabetes Consultation    PCP: Mikey Ayon MD      Initial visit with me - 6/26/23    The patient was initially diagnosed with diabetes 8 years ago.      Previous failed treatments include:  Metformin - GI upset.     Social Documentation:  Patient lives in [place] with .   Occupation: Resident  at [employer].   Exercise: no formal exercise program.   "I do not watch what I eat"      Diabetes related complications:   nephropathy.   denies Pancreatitis  denies Gastroparesis  denies DKA  denies Hx/family Hx of MEN2/MTC  reports Frequent UTIs/yeast infections     Cardiovascular Risk Factors: family history of premature cardiovascular disease, hypertension, microalbuminuria, obesity (BMI >30 kg/m2), and sedentary lifestyle.     Diabetes Medications               insulin (LANTUS SOLOSTAR U-100 INSULIN) glargine 100 units/mL SubQ pen Inject 36 Units into the skin once daily.    insulin aspart U-100 (NOVOLOG FLEXPEN U-100 INSULIN) 100 unit/mL (3 mL) InPn pen Inject 20 Units into the skin 3 (three) times daily before meals. +scale.<200=+0u, 201-250=+2u,251-300=+4u, 301-350=+6u, >350=+8u. Max 84 units/day    tirzepatide (MOUNJARO) 7.5 mg/0.5 mL PnIj Inject 7.5 mg into the skin every 7 days.     Current monitoring regimen:  [place] 14 Day CGM    The patient's [employer] CGM was downloaded and reviewed. For the past 14 days, patient average glucose was 218 mg/dL. She was above range 73% of the time, in range 27% of the time, and below range 0% of the time. The target range for this patient was 70 - 180 mg/dL. Overall, there was a pattern of hyperglycemia.      Recent hypoglycemic episodes: yes  Patient compliant with glucose checks and medication administration? Yes    DIABETES MANAGEMENT STATUS  Statin: Taking  ACE/ARB: Taking  Screening or Prevention Patient's value " "Goal Complete/Controlled?   HgA1C Testing and Control   Lab Results   Component Value Date    HGBA1C 9.0 (H) 09/14/2023      Annually/Less than 8% No   Lipid profile : 06/09/2023 Annually Yes   LDL control Lab Results   Component Value Date    LDLCALC Invalid, Trig>400.0 06/09/2023    Annually/Less than 100 mg/dl  Yes   Nephropathy screening Lab Results   Component Value Date    LABMICR 2294.0 06/09/2023     No results found for: "PROTEINUA"  No results found for: "UTPCR"   Annually Yes   Blood pressure BP Readings from Last 1 Encounters:   11/02/23 100/70    Less than 140/90 Yes   Dilated retinal exam : 04/14/2023 Annually Yes   Foot exam   : 08/04/2023 Annually No   Patient's medications, allergies, surgical, social and family histories were reviewed and updated as appropriate.     Review of Systems   Constitutional:  Negative for weight loss.   Eyes:  Negative for blurred vision and double vision.   Cardiovascular:  Negative for chest pain.   Gastrointestinal:  Negative for nausea and vomiting.   Genitourinary:  Negative for frequency.   Musculoskeletal:  Negative for falls.   Neurological:  Negative for dizziness and weakness.   Endo/Heme/Allergies:  Negative for polydipsia.   Psychiatric/Behavioral:  Negative for depression.    All other systems reviewed and are negative.       Physical Exam  Constitutional:       Appearance: Normal appearance.   HENT:      Head: Normocephalic and atraumatic.   Pulmonary:      Effort: No respiratory distress.   Musculoskeletal:      Cervical back: Normal range of motion.   Neurological:      Mental Status: She is alert and oriented to person, place, and time.   Psychiatric:         Mood and Affect: Mood normal.         Behavior: Behavior normal.        Blood pressure 100/70, height 5' 5" (1.651 m), weight 78.4 kg (172 lb 12.8 oz).  Wt Readings from Last 3 Encounters:   11/02/23 78.4 kg (172 lb 12.8 oz)   09/28/23 78.8 kg (173 lb 11.6 oz)   09/28/23 78.8 kg (173 lb 11.2 oz) "       LAB REVIEW  Lab Results   Component Value Date     (L) 06/09/2023    K 4.2 06/09/2023     06/09/2023    CO2 24 06/09/2023    BUN 11 06/09/2023    CREATININE 0.8 06/09/2023    CALCIUM 9.6 06/09/2023    ANIONGAP 8 06/09/2023    EGFRNORACEVR >60.0 06/09/2023     Lab Results   Component Value Date    CPEPTIDE 4.45 12/22/2022     Hemoglobin A1C   Date Value Ref Range Status   09/14/2023 9.0 (H) 4.0 - 5.6 % Final     Comment:     ADA Screening Guidelines:  5.7-6.4%  Consistent with prediabetes  >or=6.5%  Consistent with diabetes    High levels of fetal hemoglobin interfere with the HbA1C  assay. Heterozygous hemoglobin variants (HbS, HgC, etc)do  not significantly interfere with this assay.   However, presence of multiple variants may affect accuracy.     06/09/2023 11.7 (H) 4.0 - 5.6 % Final     Comment:     ADA Screening Guidelines:  5.7-6.4%  Consistent with prediabetes  >or=6.5%  Consistent with diabetes    High levels of fetal hemoglobin interfere with the HbA1C  assay. Heterozygous hemoglobin variants (HbS, HgC, etc)do  not significantly interfere with this assay.   However, presence of multiple variants may affect accuracy.     12/09/2022 12.4 (H) 4.0 - 5.6 % Final     Comment:     ADA Screening Guidelines:  5.7-6.4%  Consistent with prediabetes  >or=6.5%  Consistent with diabetes    High levels of fetal hemoglobin interfere with the HbA1C  assay. Heterozygous hemoglobin variants (HbS, HgC, etc)do  not significantly interfere with this assay.   However, presence of multiple variants may affect accuracy.          ASSESSMENT    ICD-10-CM ICD-9-CM   1. Type 2 diabetes mellitus without complication, unspecified whether long term insulin use  E11.9 250.00   2. Type 2 diabetes mellitus with microalbuminuria, with long-term current use of insulin  E11.29 250.40    R80.9 791.0    Z79.4 V58.67   3. Hyperlipidemia associated with type 2 diabetes mellitus  E11.69 250.80    E78.5 272.4   4. Hypertension  associated with diabetes  E11.59 250.80    I15.2 401.9         PLAN  Diagnoses and all orders for this visit:    Type 2 diabetes mellitus without complication, unspecified whether long term insulin use  -     POCT Glucose, Hand-Held Device    Type 2 diabetes mellitus with microalbuminuria, with long-term current use of insulin  -     tirzepatide 10 mg/0.5 mL PnIj; Inject 10 mg into the skin every 7 days.  -     Basic Metabolic Panel; Future  -     Hemoglobin A1C; Future  -     Microalbumin/Creatinine Ratio, Urine; Future    Hyperlipidemia associated with type 2 diabetes mellitus  -     Lipid Panel; Future    Hypertension associated with diabetes          Reviewed pathophysiology of diabetes, complications related to the disease, importance of annual dilated eye exam and daily foot examination. Explained MOA, SE, dosage of medications. Written instructions given and reviewed with patient and patient verbalizes understanding.     2023 - continues to drink 1-2 iced coffees from [employer] daily. Will see dietician today to discuss diet. Will increase mounjaro to 10 mg, lantus to 42 units daily. F/u 6 wks with labs.    PATIENT INSTRUCTIONS     Lifestyle modification with well balanced diet and at least 30 minutes of physical activity daily recommended.      Increase Mounjaro to 10 mg subcutaneously every 7 days.   Increase Lantus to 42 units subcutaneously daily.   Continue Novolog three times daily with meals using correction dosing.  Take 10-15 minutes before meal.   Bring insulin to work, take before lunch.     If  - 250, may take 16 units of Novolog  If  - 300, may take 18 units of Novolog  If  - 350, may take 20 units of Novolog  If  - 400, may take 22 units of Novolog  If +, may take 24 units of Novolog     Continue [place] 2 CGM  Will send order for Chris 3 to your pharmacy.   Blood Sugar Goals:       Fastin-130.       1-2 hours after a meal: Less than 180.     Follow up in  about 6 weeks (around 12/14/2023) for In-Person, [place], Schedule fasting labs.    Portions of this note were prepared with [employer] voice recognition transcription software. Grammatical errors, including garbled syntax, mangle pronouns, and other bizarre constructions may be attributed to that software system.        English

## 2023-11-02 NOTE — PATIENT INSTRUCTIONS
PATIENT INSTRUCTIONS     Lifestyle modification with well balanced diet and at least 30 minutes of physical activity daily recommended.   Labs ordered and will be scheduled by [facility] staff.       Increase Mounjaro to 10 mg subcutaneously every 7 days.   Increase Lantus to 42 units subcutaneously daily.   Continue Novolog three times daily with meals using correction dosing.  Take 10-15 minutes before meal.   Bring insulin to work, take before lunch.     If  - 250, may take 16 units of Novolog  If  - 300, may take 18 units of Novolog  If  - 350, may take 20 units of Novolog  If  - 400, may take 22 units of Novolog  If +, may take 24 units of Novolog     Continue Chris 2 CGM  Will send order for Chris 3 to your pharmacy.   Blood Sugar Goals:       Fastin-130.       1-2 hours after a meal: Less than 180.

## 2023-11-08 ENCOUNTER — CLINICAL SUPPORT (OUTPATIENT)
Dept: SMOKING CESSATION | Facility: CLINIC | Age: 53
End: 2023-11-08
Payer: COMMERCIAL

## 2023-11-08 DIAGNOSIS — F17.200 NICOTINE DEPENDENCE: Primary | ICD-10-CM

## 2023-11-08 PROCEDURE — 99999 PR PBB SHADOW E&M-EST. PATIENT-LVL II: CPT | Mod: PBBFAC,,,

## 2023-11-08 PROCEDURE — 99402 PR PREVENT COUNSEL,INDIV,30 MIN: ICD-10-PCS | Mod: S$GLB,,,

## 2023-11-08 PROCEDURE — 99999 PR PBB SHADOW E&M-EST. PATIENT-LVL II: ICD-10-PCS | Mod: PBBFAC,,,

## 2023-11-08 PROCEDURE — 99402 PREV MED CNSL INDIV APPRX 30: CPT | Mod: S$GLB,,,

## 2023-11-08 RX ORDER — IBUPROFEN 200 MG
2 TABLET ORAL DAILY
Qty: 56 PATCH | Refills: 0 | Status: SHIPPED | OUTPATIENT
Start: 2023-11-08 | End: 2023-12-12 | Stop reason: SDUPTHER

## 2023-11-08 RX ORDER — VARENICLINE TARTRATE 1 MG/1
1 TABLET, FILM COATED ORAL 2 TIMES DAILY
Qty: 56 TABLET | Refills: 0 | Status: SHIPPED | OUTPATIENT
Start: 2023-11-08 | End: 2023-12-12 | Stop reason: SDUPTHER

## 2023-11-08 RX ORDER — NICOTINE POLACRILEX 2 MG/1
2 LOZENGE ORAL
Qty: 81 EACH | Refills: 0 | Status: SHIPPED | OUTPATIENT
Start: 2023-11-08 | End: 2023-12-12 | Stop reason: SDUPTHER

## 2023-11-08 NOTE — Clinical Note
The patient was seen in the clinic for smoking cessation follow up.  She states she smokes   cigarettes per day. The patient remains on the prescribed tobacco cessation medication regimen of 1 mg Chantix BID, 21 mg patches (doubled) and 2 mg lozenges prn without any negative side effects at this time. Session Focus: completion of TCRS (Tobacco Cessation Rating Scale) reviewed strategies, cues, and triggers. Introduced the negative impact of tobacco on health, the health advantages of discontinuing the use of tobacco, time line improved health changes after a quit, withdrawal issues to expect from nicotine and habit, and ways to achieve the goal of a quit. The patient denies any abnormal behavioral or mental changes at this time. The patient will continue with  therapy sessions and medication monitoring by CTTS. Prescribed medication management will be by physician.

## 2023-11-08 NOTE — PROGRESS NOTES
Individual Follow-Up Form    11/8/2023    Quit Date: tbd    Clinical Status of Patient: Outpatient    Length of Service: 30 minutes    Continuing Medication: yes  Chantix, Patches, or Nicotine Lozenges    Other Medications: none     Target Symptoms: Withdrawal and medication side effects. The following were  rated moderate (3) to severe (4) on TCRS:  Moderate (3): none  Severe (4): none    Comments: The patient was seen in the clinic for smoking cessation follow up.  She states she smokes   cigarettes per day. The patient remains on the prescribed tobacco cessation medication regimen of 1 mg Chantix BID, 21 mg patches (doubled) and 2 mg lozenges prn without any negative side effects at this time. Session Focus: completion of TCRS (Tobacco Cessation Rating Scale) reviewed strategies, cues, and triggers. Introduced the negative impact of tobacco on health, the health advantages of discontinuing the use of tobacco, time line improved health changes after a quit, withdrawal issues to expect from nicotine and habit, and ways to achieve the goal of a quit. The patient denies any abnormal behavioral or mental changes at this time. The patient will continue with  therapy sessions and medication monitoring by CTTS. Prescribed medication management will be by physician.      Diagnosis: F17.210    Next Visit: 1 week

## 2023-11-15 ENCOUNTER — CLINICAL SUPPORT (OUTPATIENT)
Dept: SMOKING CESSATION | Facility: CLINIC | Age: 53
End: 2023-11-15
Payer: COMMERCIAL

## 2023-11-15 DIAGNOSIS — F17.200 NICOTINE DEPENDENCE: Primary | ICD-10-CM

## 2023-11-15 PROCEDURE — 99402 PR PREVENT COUNSEL,INDIV,30 MIN: ICD-10-PCS | Mod: S$GLB,,,

## 2023-11-15 PROCEDURE — 99402 PREV MED CNSL INDIV APPRX 30: CPT | Mod: S$GLB,,,

## 2023-11-15 PROCEDURE — 99999 PR PBB SHADOW E&M-EST. PATIENT-LVL II: ICD-10-PCS | Mod: PBBFAC,,,

## 2023-11-15 PROCEDURE — 99999 PR PBB SHADOW E&M-EST. PATIENT-LVL II: CPT | Mod: PBBFAC,,,

## 2023-11-15 NOTE — Clinical Note
The patient was seen in the clinic for smoking cessation follow up.  S/He states s/he smokes 10 cigarettes per day. Per Smokerlyzer CO 17  ppm, last smoked   hours prior to meeting.  The patient remains on the prescribed tobacco cessation medication regimen of 2 mg alexus 3-4 per day, 21 mg doubled and 1 mg chatinx bid without any negative side effects at this time. Session Focus:  completion of TCRS (Tobacco Cessation Rating Scale) reviewed strategies, habitual behavior, high risks situations, understanding urges and cravings, stress and relaxation with open discussion and additional interventions, Introduced lapses, relapses, understanding them and analyzing the situation of a lapse, conflict issues that may be linked to a lapse. GOALS: 1.  Ration 10     2. Distractor list      The patient denies any abnormal behavioral or mental changes at this time. The patient will continue with  therapy sessions and medication monitoring by CTTS. Prescribed medication management will be by physician.

## 2023-11-15 NOTE — PROGRESS NOTES
Individual Follow-Up Form    11/15/2023    Quit Date: tbd    Clinical Status of Patient: Outpatient    Length of Service: 30 minutes    Continuing Medication: yes  Chantix, Patches, or Nicotine Lozenges    Other Medications: none     Target Symptoms: Withdrawal and medication side effects. The following were  rated moderate (3) to severe (4) on TCRS:  Moderate (3): none  Severe (4): none    Comments: The patient was seen in the clinic for smoking cessation follow up.  S/He states s/he smokes 10 cigarettes per day. Per Smokerlyzer CO 17  ppm, last smoked   hours prior to meeting.  The patient remains on the prescribed tobacco cessation medication regimen of 2 mg aelxus 3-4 per day, 21 mg doubled and 1 mg chatinx bid without any negative side effects at this time. Session Focus:  completion of TCRS (Tobacco Cessation Rating Scale) reviewed strategies, habitual behavior, high risks situations, understanding urges and cravings, stress and relaxation with open discussion and additional interventions, Introduced lapses, relapses, understanding them and analyzing the situation of a lapse, conflict issues that may be linked to a lapse. GOALS: 1.  Ration 10     2. Distractor list      The patient denies any abnormal behavioral or mental changes at this time. The patient will continue with  therapy sessions and medication monitoring by CTTS. Prescribed medication management will be by physician.    Diagnosis: F17.210    Next Visit: 2 weeks

## 2023-11-20 ENCOUNTER — PATIENT MESSAGE (OUTPATIENT)
Dept: ADMINISTRATIVE | Facility: HOSPITAL | Age: 53
End: 2023-11-20
Payer: COMMERCIAL

## 2023-11-20 DIAGNOSIS — Z12.11 ENCOUNTER FOR COLORECTAL CANCER SCREENING: Primary | ICD-10-CM

## 2023-11-20 DIAGNOSIS — Z12.12 ENCOUNTER FOR COLORECTAL CANCER SCREENING: Primary | ICD-10-CM

## 2023-11-20 NOTE — TELEPHONE ENCOUNTER
Recommended colon screening in order preference would be as below.  Can see what she wants to do.    1-colonoscopy , good for 10 years if negative.  Can remove polyps as they develop before they turn into anything.     2-Cologuard, checks for  tumor DNA in the stool.  If negative can recheck in 3 years.  Colonoscopy required if positive test    3-fit kit checks for blood in stool.  Needs to be done every year.  Colonoscopy required if positive test

## 2023-12-12 ENCOUNTER — CLINICAL SUPPORT (OUTPATIENT)
Dept: SMOKING CESSATION | Facility: CLINIC | Age: 53
End: 2023-12-12
Payer: COMMERCIAL

## 2023-12-12 DIAGNOSIS — F17.200 NICOTINE DEPENDENCE: Primary | ICD-10-CM

## 2023-12-12 PROCEDURE — 99999 PR PBB SHADOW E&M-EST. PATIENT-LVL II: ICD-10-PCS | Mod: PBBFAC,,,

## 2023-12-12 PROCEDURE — 99999 PR PBB SHADOW E&M-EST. PATIENT-LVL II: CPT | Mod: PBBFAC,,,

## 2023-12-12 PROCEDURE — 99402 PREV MED CNSL INDIV APPRX 30: CPT | Mod: S$GLB,,,

## 2023-12-12 PROCEDURE — 99402 PR PREVENT COUNSEL,INDIV,30 MIN: ICD-10-PCS | Mod: S$GLB,,,

## 2023-12-12 RX ORDER — NICOTINE POLACRILEX 2 MG/1
2 LOZENGE ORAL
Qty: 81 EACH | Refills: 0 | Status: SHIPPED | OUTPATIENT
Start: 2023-12-12

## 2023-12-12 RX ORDER — IBUPROFEN 200 MG
2 TABLET ORAL DAILY
Qty: 56 PATCH | Refills: 0 | Status: SHIPPED | OUTPATIENT
Start: 2023-12-12 | End: 2024-01-23 | Stop reason: SDUPTHER

## 2023-12-12 RX ORDER — VARENICLINE TARTRATE 1 MG/1
1 TABLET, FILM COATED ORAL 2 TIMES DAILY
Qty: 56 TABLET | Refills: 0 | Status: SHIPPED | OUTPATIENT
Start: 2023-12-12 | End: 2024-01-23 | Stop reason: SDUPTHER

## 2023-12-12 NOTE — PROGRESS NOTES
Individual Follow-Up Form    12/12/2023    Quit Date: tbd    Clinical Status of Patient: Outpatient    Length of Service: 30 minutes    Continuing Medication: yes  Chantix, Patches, or Nicotine Lozenges    Other Medications: none     Target Symptoms: Withdrawal and medication side effects. The following were  rated moderate (3) to severe (4) on TCRS:  Moderate (3): none  Severe (4): none    Comments: Chart opened late due to telephone call visit and not able to open chart. The patient was seen in the clinic for smoking cessation follow up. She states she smokes 8-10 cigarettes per day. The patient remains on the prescribed tobacco cessation medication regimen of 1 mg Chantix BID, 21 mg patches and 2 mg lozenges without any negative side effects at this time. Session Focus: completion of TCRS (Tobacco Cessation Rating Scale) reviewed strategies, controlling environment, cues, triggers, new goals set. Introduced high risk situations with preparation interventions, caffeine similarities with withdrawal issues of habit and nicotine, Alcohol, Understanding urges, cravings, stress and relaxation. Open discussion with intervention discussion. GOALS: 1. Move cig pack.  2. Consider a quit date. The patient denies any abnormal behavioral or mental changes at this time. The patient will continue with  therapy sessions and medication monitoring by CTTS. Prescribed medication management will be by physician.    Diagnosis: F17.210    Next Visit: 2 weeks

## 2023-12-12 NOTE — Clinical Note
The patient was seen in the clinic for smoking cessation follow up. She states she smokes 8-10 cigarettes per day. The patient remains on the prescribed tobacco cessation medication regimen of 1 mg Chantix BID, 21 mg patches and 2 mg lozenges without any negative side effects at this time. Session Focus: completion of TCRS (Tobacco Cessation Rating Scale) reviewed strategies, controlling environment, cues, triggers, new goals set. Introduced high risk situations with preparation interventions, caffeine similarities with withdrawal issues of habit and nicotine, Alcohol, Understanding urges, cravings, stress and relaxation. Open discussion with intervention discussion. GOALS: 1. Move cig pack.  2. Consider a quit date. The patient denies any abnormal behavioral or mental changes at this time.

## 2023-12-14 ENCOUNTER — CLINICAL SUPPORT (OUTPATIENT)
Dept: SMOKING CESSATION | Facility: CLINIC | Age: 53
End: 2023-12-14
Payer: COMMERCIAL

## 2023-12-14 DIAGNOSIS — F17.200 NICOTINE DEPENDENCE: Primary | ICD-10-CM

## 2023-12-14 PROCEDURE — 99407 PR TOBACCO USE CESSATION INTENSIVE >10 MINUTES: ICD-10-PCS | Mod: S$GLB,,,

## 2023-12-14 PROCEDURE — 99999 PR PBB SHADOW E&M-EST. PATIENT-LVL I: ICD-10-PCS | Mod: PBBFAC,,,

## 2023-12-14 PROCEDURE — 99999 PR PBB SHADOW E&M-EST. PATIENT-LVL I: CPT | Mod: PBBFAC,,,

## 2023-12-14 PROCEDURE — 99407 BEHAV CHNG SMOKING > 10 MIN: CPT | Mod: S$GLB,,,

## 2023-12-14 NOTE — PROGRESS NOTES
Called pt to f/u on her 3 month smoking cessation quit status. Pt stated she is still smoking, but has cut back and is still actively enrolled in quit. Pt is using Chantix to aid in her quit. Informed her of benefit period, phone follow ups, and contact information. Will complete smart form and resolved episode #1 per protocol. Completed 3 month smart form for quit #2 and will continue to follow up with patient.

## 2023-12-27 DIAGNOSIS — Z79.4 TYPE 2 DIABETES MELLITUS WITH MICROALBUMINURIA, WITH LONG-TERM CURRENT USE OF INSULIN: Primary | ICD-10-CM

## 2023-12-27 DIAGNOSIS — E11.29 TYPE 2 DIABETES MELLITUS WITH MICROALBUMINURIA, WITH LONG-TERM CURRENT USE OF INSULIN: Primary | ICD-10-CM

## 2023-12-27 DIAGNOSIS — R80.9 TYPE 2 DIABETES MELLITUS WITH MICROALBUMINURIA, WITH LONG-TERM CURRENT USE OF INSULIN: Primary | ICD-10-CM

## 2023-12-27 RX ORDER — INSULIN ASPART 100 [IU]/ML
20 INJECTION, SOLUTION INTRAVENOUS; SUBCUTANEOUS
Qty: 27 ML | Refills: 5 | Status: SHIPPED | OUTPATIENT
Start: 2023-12-27

## 2023-12-27 NOTE — TELEPHONE ENCOUNTER
Care Due:                  Date            Visit Type   Department     Provider  --------------------------------------------------------------------------------                                EP -                              PRIMARY      [facility] FAM[facility]  Last Visit: 09-      CARE ([facility])   MEDICINE       Mikey Ayon  Next Visit: None Scheduled  None         None Found                                                            Last  Test          Frequency    Reason                     Performed    Due Date  --------------------------------------------------------------------------------    CBC.........  12 months..  fenofibrate..............  Not Found    Overdue    HBA1C.......  6 months...  insulin..................  09- 03-    Health [facility] Embedded Care Due Messages. Reference number: [id number].   12/27/2023 4:18:03 PM CST

## 2024-01-12 ENCOUNTER — TELEPHONE (OUTPATIENT)
Dept: DIABETES | Facility: CLINIC | Age: 54
End: 2024-01-12
Payer: COMMERCIAL

## 2024-01-12 DIAGNOSIS — E11.29 TYPE 2 DIABETES MELLITUS WITH MICROALBUMINURIA, WITH LONG-TERM CURRENT USE OF INSULIN: Primary | ICD-10-CM

## 2024-01-12 DIAGNOSIS — R80.9 TYPE 2 DIABETES MELLITUS WITH MICROALBUMINURIA, WITH LONG-TERM CURRENT USE OF INSULIN: Primary | ICD-10-CM

## 2024-01-12 DIAGNOSIS — Z79.4 TYPE 2 DIABETES MELLITUS WITH MICROALBUMINURIA, WITH LONG-TERM CURRENT USE OF INSULIN: Primary | ICD-10-CM

## 2024-01-12 RX ORDER — TIRZEPATIDE 5 MG/.5ML
5 INJECTION, SOLUTION SUBCUTANEOUS
Qty: 4 PEN | Refills: 1 | Status: SHIPPED | OUTPATIENT
Start: 2024-01-12 | End: 2024-01-16 | Stop reason: SDUPTHER

## 2024-01-12 NOTE — TELEPHONE ENCOUNTER
----- Message from Merrill Abel MA sent at 1/12/2024  9:15 AM CST -----  Contact: [phone]  Patient states Mounjaro 7.5 makes her very nauseated  ----- Message -----  From: Onel Nayak  Sent: 1/12/2024   8:47 AM CST  To: Brigette Marrero Staff    Pt is calling regarding medication,Mounjaro 7.5 , pt states meds make her very nauseated . Please call back at [phone] . Thanksdj

## 2024-01-12 NOTE — TELEPHONE ENCOUNTER
Spoke with patient to inform her that the mounjaro 5 mg was sent to her pharmacy.  Patient voiced understanding

## 2024-01-16 DIAGNOSIS — R80.9 TYPE 2 DIABETES MELLITUS WITH MICROALBUMINURIA, WITH LONG-TERM CURRENT USE OF INSULIN: ICD-10-CM

## 2024-01-16 DIAGNOSIS — Z79.4 TYPE 2 DIABETES MELLITUS WITH MICROALBUMINURIA, WITH LONG-TERM CURRENT USE OF INSULIN: ICD-10-CM

## 2024-01-16 DIAGNOSIS — E11.29 TYPE 2 DIABETES MELLITUS WITH MICROALBUMINURIA, WITH LONG-TERM CURRENT USE OF INSULIN: ICD-10-CM

## 2024-01-16 RX ORDER — TIRZEPATIDE 5 MG/.5ML
5 INJECTION, SOLUTION SUBCUTANEOUS
Qty: 4 PEN | Refills: 1 | Status: SHIPPED | OUTPATIENT
Start: 2024-01-16 | End: 2024-05-01

## 2024-01-16 NOTE — TELEPHONE ENCOUNTER
----- Message from Karli Vasquez sent at 1/16/2024 11:27 AM CST -----  Contact: Yarely  Pt needs refill for tirzepatide (MOUNJARO) 5 mg/0.5 mL PnIj. Pt can be reached at .[phone].        [id number] - [place], [place] - [address] AT [facility] [address]  [address] [facility] [address]  Phone: [phone] Fax: [phone]      Thanks  IMANI

## 2024-01-22 NOTE — TELEPHONE ENCOUNTER
No care due was identified.  [facility] Embedded Care Due Messages. Reference number: [id number].   1/22/2024 10:46:27 AM CST

## 2024-01-23 ENCOUNTER — CLINICAL SUPPORT (OUTPATIENT)
Dept: SMOKING CESSATION | Facility: CLINIC | Age: 54
End: 2024-01-23
Payer: COMMERCIAL

## 2024-01-23 DIAGNOSIS — F17.200 NICOTINE DEPENDENCE: ICD-10-CM

## 2024-01-23 PROCEDURE — 99999 PR PBB SHADOW E&M-EST. PATIENT-LVL II: CPT | Mod: PBBFAC,,,

## 2024-01-23 PROCEDURE — 99402 PREV MED CNSL INDIV APPRX 30: CPT | Mod: S$GLB,,,

## 2024-01-23 RX ORDER — FENOFIBRATE 48 MG/1
48 TABLET, FILM COATED ORAL DAILY
Qty: 90 TABLET | Refills: 1 | Status: SHIPPED | OUTPATIENT
Start: 2024-01-23 | End: 2024-07-21

## 2024-01-23 RX ORDER — IBUPROFEN 200 MG
2 TABLET ORAL DAILY
Qty: 56 PATCH | Refills: 0 | Status: SHIPPED | OUTPATIENT
Start: 2024-01-23 | End: 2024-02-28 | Stop reason: SDUPTHER

## 2024-01-23 RX ORDER — VARENICLINE TARTRATE 1 MG/1
1 TABLET, FILM COATED ORAL 2 TIMES DAILY
Qty: 56 TABLET | Refills: 0 | Status: SHIPPED | OUTPATIENT
Start: 2024-01-23 | End: 2024-02-28 | Stop reason: SDUPTHER

## 2024-01-23 NOTE — Clinical Note
The patient was seen in the clinic for smoking cessation follow up. She states she smokes 10+ cigarettes per day. Per Smokerlyzer CO 8 ppm, last smoked 2 hours prior to meeting. The patient remains on the prescribed tobacco cessation medication regimen of 1 mg Chantix, 21 mg patches (doubled for 2 more weeks then reduce to QD) and 2 mg lozenges without any negative side effects at this time. Session Focus:  Completion of TCRS (Tobacco Cessation Rating Scale) reviewed strategies, habitual behavior, stress, and high risk situations. Introduced stress with addition interventions, SOLVE, relaxation with interventions, nutrition, exercise, weight gain, and the importance of rewarding oneself for accomplishments toward becoming tobacco free. Open discussion of all items with interventions.  GOALS: 1.consider a quit date. 2. Breathing exercises. The patient denies any abnormal behavioral or mental changes at this time. The patient will continue with  therapy sessions and medication monitoring by CTTS.

## 2024-01-23 NOTE — PROGRESS NOTES
Individual Follow-Up Form    1/23/2024    Quit Date: tbd    Clinical Status of Patient: Outpatient    Length of Service: 30 minutes    Continuing Medication: yes  Chantix, Patches, or Nicotine Lozenges    Other Medications: none     Target Symptoms: Withdrawal and medication side effects. The following were  rated moderate (3) to severe (4) on TCRS:  Moderate (3): none  Severe (4): none    Comments: The patient was seen in the clinic for smoking cessation follow up. She states she smokes 10+ cigarettes per day. Per Smokerlyzer CO 8 ppm, last smoked 2 hours prior to meeting. The patient remains on the prescribed tobacco cessation medication regimen of 1 mg Chantix, 21 mg patches (doubled for 2 more weeks then reduce to QD) and 2 mg lozenges without any negative side effects at this time. Session Focus:  Completion of TCRS (Tobacco Cessation Rating Scale) reviewed strategies, habitual behavior, stress, and high risk situations. Introduced stress with addition interventions, SOLVE, relaxation with interventions, nutrition, exercise, weight gain, and the importance of rewarding oneself for accomplishments toward becoming tobacco free. Open discussion of all items with interventions.  GOALS: 1.consider a quit date. 2. Breathing exercises. The patient denies any abnormal behavioral or mental changes at this time. The patient will continue with  therapy sessions and medication monitoring by CTTS. Prescribed medication management will be by physician.    Diagnosis: F17.210    Next Visit: 2 weeks

## 2024-01-29 RX ORDER — ROSUVASTATIN CALCIUM 40 MG/1
40 TABLET, COATED ORAL
Qty: 90 TABLET | Refills: 1 | Status: SHIPPED | OUTPATIENT
Start: 2024-01-29 | End: 2024-05-13 | Stop reason: SDUPTHER

## 2024-01-29 NOTE — TELEPHONE ENCOUNTER
No care due was identified.  Health [facility] Embedded Care Due Messages. Reference number: [id number].   1/29/2024 10:17:23 AM CST

## 2024-01-29 NOTE — TELEPHONE ENCOUNTER
Refill Decision Note   Yarely Thompson  is requesting a refill authorization.  Brief Assessment and Rationale for Refill:  Approve     Medication Therapy Plan:         Comments:     Note composed:4:41 PM 01/29/2024

## 2024-01-31 ENCOUNTER — TELEPHONE (OUTPATIENT)
Dept: FAMILY MEDICINE | Facility: CLINIC | Age: 54
End: 2024-01-31
Payer: COMMERCIAL

## 2024-01-31 ENCOUNTER — OFFICE VISIT (OUTPATIENT)
Dept: FAMILY MEDICINE | Facility: CLINIC | Age: 54
End: 2024-01-31
Payer: COMMERCIAL

## 2024-01-31 DIAGNOSIS — J32.9 SINUSITIS, UNSPECIFIED CHRONICITY, UNSPECIFIED LOCATION: Primary | ICD-10-CM

## 2024-01-31 PROCEDURE — 1159F MED LIST DOCD IN RCRD: CPT | Mod: CPTII,95,, | Performed by: FAMILY MEDICINE

## 2024-01-31 PROCEDURE — 3072F LOW RISK FOR RETINOPATHY: CPT | Mod: CPTII,95,, | Performed by: FAMILY MEDICINE

## 2024-01-31 PROCEDURE — 99213 OFFICE O/P EST LOW 20 MIN: CPT | Mod: 95,,, | Performed by: FAMILY MEDICINE

## 2024-01-31 PROCEDURE — 4010F ACE/ARB THERAPY RXD/TAKEN: CPT | Mod: CPTII,95,, | Performed by: FAMILY MEDICINE

## 2024-01-31 PROCEDURE — 1160F RVW MEDS BY RX/DR IN RCRD: CPT | Mod: CPTII,95,, | Performed by: FAMILY MEDICINE

## 2024-01-31 RX ORDER — AZITHROMYCIN 250 MG/1
TABLET, FILM COATED ORAL
Qty: 6 TABLET | Refills: 0 | Status: SHIPPED | OUTPATIENT
Start: 2024-01-31

## 2024-01-31 RX ORDER — PROMETHAZINE HYDROCHLORIDE AND DEXTROMETHORPHAN HYDROBROMIDE 6.25; 15 MG/5ML; MG/5ML
5 SYRUP ORAL 4 TIMES DAILY
Qty: 240 ML | Refills: 0 | Status: SHIPPED | OUTPATIENT
Start: 2024-01-31 | End: 2024-02-12

## 2024-01-31 NOTE — PROGRESS NOTES
The patient location is: Home  The chief complaint leading to consultation is:Sinus  Visit type: Virtual visit with synchronous audio   Total time spent with patient: 15 minutes  Each patient to whom he or she provides medical services by telemedicine is:  (1) informed of the relationship between the physician and patient and the respective role of any other health care provider with respect to management of the patient; and (2) notified that he or she may decline to receive medical services by telemedicine and may withdraw from such care at any time.    Notes:   Diagnoses and all orders for this visit:    Sinusitis, unspecified chronicity, unspecified location    Other orders  -     azithromycin (Z-KVNG) 250 MG tablet; Take 2 tabs today then one tab daily for 4 days  -     promethazine-dextromethorphan (PROMETHAZINE-DM) 6.25-15 mg/5 mL Syrp; Take 5 mLs by mouth 4 (four) times daily. for 12 days        Yarely Thompson presents with moderate upper respiratory congestion,sinus pressure w green dc,moderate cough past 2 weeks. No dyspnea Denies nausea,vomiting,diarrhea or significant fever.    Past Medical History:   Diagnosis Date    Anxiety     Cholelithiasis     Depression     Fatty liver     HTN (hypertension)     Hyperlipidemia     Proteinuria     Dr. Sadler    Severe obesity (BMI 35.0-35.9 with comorbidity) 3/16/2018    ADEN (stress urinary incontinence, female)     Type 2 diabetes mellitus with hypertriglyceridemia 6/6/2022    Type 2 diabetes mellitus with microalbuminuria, with long-term current use of insulin 7/22/2016    Vitamin D insufficiency      History reviewed. No pertinent surgical history.  Review of patient's allergies indicates:   Allergen Reactions    Pcn [penicillins] Rash     Current Outpatient Medications on File Prior to Visit   Medication Sig Dispense Refill    ARIPiprazole (ABILIFY) 5 MG Tab Take 1 tablet (5 mg total) by mouth once daily. 30 tablet 0    blood-glucose meter kit Use as directed       "blood-glucose sensor (FREESTYLE VIRGILIO 3 SENSOR) Dana 1 each by Misc.(Non-Drug; Combo Route) route once daily. 2 each 11    clonazePAM (KLONOPIN) 0.5 MG tablet Take 1 tablet (0.5 mg total) by mouth every evening. 30 tablet 0    fenofibrate (TRICOR) 48 MG tablet Take 1 tablet (48 mg total) by mouth once daily. 90 tablet 1    insulin (LANTUS SOLOSTAR U-100 INSULIN) glargine 100 units/mL SubQ pen Inject 36 Units into the skin once daily. 32.4 mL 3    insulin aspart U-100 (NOVOLOG FLEXPEN U-100 INSULIN) 100 unit/mL (3 mL) InPn pen Inject 20 Units into the skin 3 (three) times daily before meals. +scale.<200=+0u, 201-250=+2u,251-300=+4u, 301-350=+6u, >350=+8u. Max 84 units/day 27 mL 5    lisinopriL (PRINIVIL,ZESTRIL) 20 MG tablet TAKE ONE TABLET BY MOUTH once DAILY 90 tablet 1    nicotine (NICODERM CQ) 21 mg/24 hr Place 2 patches onto the skin once daily. Dose appropriate. 56 patch 0    nicotine, polacrilex, 2 mg lzmn Take 1 lozenge (2 mg total) by mouth as needed (as needed). Dispense MINIS please ONLY. DO NOT CHEW UP. Can take 1-2 per hour in place of a cigarette for breakthrough cravings in place of a cigarette. 81 each 0    prazosin (MINIPRESS) 2 MG Cap Take 2 mg by mouth every evening.      rosuvastatin (CRESTOR) 40 MG Tab TAKE ONE TABLET BY MOUTH EVERY DAY 90 tablet 1    sertraline (ZOLOFT) 100 MG tablet Take 2 tablets (200 mg total) by mouth once daily. 60 tablet 0    spironolactone (ALDACTONE) 25 MG tablet Take 1 tablet (25 mg total) by mouth once daily. 30 tablet 11    SURE COMFORT PEN NEEDLE 31 gauge x 3/16" Ndle USE TO INJECT INSULINS FOUR TIMES DAILY 100 each 5    tirzepatide (MOUNJARO) 5 mg/0.5 mL PnIj Inject 5 mg into the skin every 7 days. 4 pen 1    varenicline (CHANTIX) 1 mg Tab Take 1 tablet (1 mg total) by mouth 2 (two) times daily. 56 tablet 0     No current facility-administered medications on file prior to visit.     Social History     Socioeconomic History    Marital status:    Tobacco Use    " Smoking status: Every Day     Current packs/day: 0.00     Average packs/day: 1.5 packs/day for 28.0 years (42.0 ttl pk-yrs)     Types: Cigarettes     Start date: 1994     Last attempt to quit: 2022     Years since quittin.4     Passive exposure: Current    Smokeless tobacco: Never   Substance and Sexual Activity    Alcohol use: No     Family History   Problem Relation Age of Onset    Hypertension Mother     Diabetes Mother     Lymphoma Father     Hypertension Father     Diabetes Father     Heart disease Brother 57    Breast cancer Maternal Aunt     Ovarian cancer Maternal Grandmother     Kidney disease Neg Hx     Glaucoma Neg Hx     Macular degeneration Neg Hx     Retinal detachment Neg Hx          ROS:  SKIN: No rashes, itching or changes in color or texture of skin.  EYES: Visual acuity fine. No photophobia, ocular pain or diplopia.EARS: Denies ear pain, discharge or vertigo.NOSE: No loss of smell, no epistaxis some postnasal drip.MOUTH & THROAT: No hoarseness or change in voice. No excessive gum bleeding.CHEST: Denies VELÁSQUEZ, cyanosis, wheezing  CARDIOVASCULAR: Denies chest pain, PND, orthopnea or reduced exercise tolerance.  ABDOMEN:  No weight loss.No abdominal pain, no hematemesis or blood in stool.  URINARY: No flank pain, dysuria or hematuria.  PE:   Chest:No tachypnea. No wheezing, rhonchi on forced expiration  Abdomen:Soft, non tender to patient palpation

## 2024-01-31 NOTE — TELEPHONE ENCOUNTER
----- Message from Edyta Valle sent at 1/31/2024  1:28 PM CST -----  Type:  Needs Medical Advice    Who Called: PT  Symptoms (please be specific): green mucus coming out nose,bad cough   How long has patient had these symptoms:  2 weeks now  Pharmacy name and phone #:  .  [facility] - [place], [place] - [address]  [address]  [place] [address]  Phone: [phone] Fax: [phone]      Would the patient rather a call back or a response via [facility]? CALL  Best Call Back Number: .Telephone Information:  Mobile          [phone]      Additional Information: Pt states over the counter meds are not working.Please advise thank you

## 2024-02-05 ENCOUNTER — PATIENT OUTREACH (OUTPATIENT)
Dept: ADMINISTRATIVE | Facility: HOSPITAL | Age: 54
End: 2024-02-05
Payer: COMMERCIAL

## 2024-02-07 ENCOUNTER — CLINICAL SUPPORT (OUTPATIENT)
Dept: SMOKING CESSATION | Facility: CLINIC | Age: 54
End: 2024-02-07
Payer: COMMERCIAL

## 2024-02-07 DIAGNOSIS — F17.200 NICOTINE DEPENDENCE: Primary | ICD-10-CM

## 2024-02-07 PROCEDURE — 99404 PREV MED CNSL INDIV APPRX 60: CPT | Mod: S$GLB,,,

## 2024-02-07 PROCEDURE — 99999 PR PBB SHADOW E&M-EST. PATIENT-LVL II: CPT | Mod: PBBFAC,,,

## 2024-02-07 NOTE — Clinical Note
The patient was seen in the clinic for smoking cessation follow up.  She states she smokes 10 cigarettes per day. Per Smokerlyzer CO 16 ppm, last smoked right before our meeting. The patient remains on the prescribed tobacco cessation medication regimen of 1 mg Chantix bid without any negative side effects at this time. Session Focus:  Completion of TCRS (Tobacco Cessation Rating Scale) reviewed strategies, habitual behavior, stress, and high risk situations. Introduced stress with addition interventions, SOLVE, relaxation with interventions, nutrition, exercise, weight gain, and the importance of rewarding oneself for accomplishments toward becoming tobacco free. Open discussion of all items with interventions.  GOALS: 1. 9 goal number ration out. 2.Breathing exercises. The patient denies any abnormal behavioral or mental changes at this time. The patient will continue with  therapy sessions and medication monitoring by CTTS. Prescribed medication management will be by physician.

## 2024-02-07 NOTE — PROGRESS NOTES
Individual Follow-Up Form    2/7/2024    Quit Date: tbd    Clinical Status of Patient: Outpatient    Length of Service: 60 minutes    Continuing Medication: yes  Chantix    Other Medications: lozenges not using     Target Symptoms: Withdrawal and medication side effects. The following were  rated moderate (3) to severe (4) on TCRS:  Moderate (3): none  Severe (4): none    Comments: The patient was seen in the clinic for smoking cessation follow up.  She states she smokes 10 cigarettes per day. Per Smokerlyzer CO 16 ppm, last smoked right before our meeting. The patient remains on the prescribed tobacco cessation medication regimen of 1 mg Chantix bid without any negative side effects at this time. Session Focus:  Completion of TCRS (Tobacco Cessation Rating Scale) reviewed strategies, habitual behavior, stress, and high risk situations. Introduced stress with addition interventions, SOLVE, relaxation with interventions, nutrition, exercise, weight gain, and the importance of rewarding oneself for accomplishments toward becoming tobacco free. Open discussion of all items with interventions.  GOALS: 1. 9 goal number ration out. 2.Breathing exercises. The patient denies any abnormal behavioral or mental changes at this time. The patient will continue with  therapy sessions and medication monitoring by CTTS. Prescribed medication management will be by physician.    Diagnosis: F17.210    Next Visit: 2 weeks

## 2024-02-19 ENCOUNTER — PATIENT OUTREACH (OUTPATIENT)
Dept: ADMINISTRATIVE | Facility: HOSPITAL | Age: 54
End: 2024-02-19
Payer: COMMERCIAL

## 2024-02-19 RX ORDER — LISINOPRIL 20 MG/1
20 TABLET ORAL DAILY
Qty: 90 TABLET | Refills: 1 | Status: SHIPPED | OUTPATIENT
Start: 2024-02-19 | End: 2024-05-13 | Stop reason: SDUPTHER

## 2024-02-19 NOTE — PROGRESS NOTES
Med Adherence Commercial Report: Tried calling patient, no answer. LVM. Called pharmacy in chart that shows last sent to, they last filled on 1/2024 for #30 day   supply.     Patient called back once I hung up with pharmacy. Stated that she does take the lisinopril and will need refill. Sent refill request to provider staff.

## 2024-02-28 ENCOUNTER — CLINICAL SUPPORT (OUTPATIENT)
Dept: SMOKING CESSATION | Facility: CLINIC | Age: 54
End: 2024-02-28
Payer: COMMERCIAL

## 2024-02-28 DIAGNOSIS — F17.200 NICOTINE DEPENDENCE: ICD-10-CM

## 2024-02-28 PROCEDURE — 99402 PREV MED CNSL INDIV APPRX 30: CPT | Mod: S$GLB,,,

## 2024-02-28 PROCEDURE — 99999 PR PBB SHADOW E&M-EST. PATIENT-LVL I: CPT | Mod: PBBFAC,,,

## 2024-02-28 RX ORDER — VARENICLINE TARTRATE 1 MG/1
1 TABLET, FILM COATED ORAL 2 TIMES DAILY
Qty: 56 TABLET | Refills: 0 | Status: SHIPPED | OUTPATIENT
Start: 2024-02-28 | End: 2024-03-28 | Stop reason: SDUPTHER

## 2024-02-28 RX ORDER — IBUPROFEN 200 MG
2 TABLET ORAL DAILY
Qty: 56 PATCH | Refills: 0 | Status: SHIPPED | OUTPATIENT
Start: 2024-02-28 | End: 2024-03-28 | Stop reason: SDUPTHER

## 2024-02-28 NOTE — PROGRESS NOTES
Individual Follow-Up Form    2/28/2024    Quit Date: tbd    Clinical Status of Patient: Outpatient    Length of Service: 30 minutes    Continuing Medication: yes  Chantix or Patches    Other Medications: none     Target Symptoms: Withdrawal and medication side effects. The following were  rated moderate (3) to severe (4) on TCRS:  Moderate (3): none  Severe (4): none    Comments: The patient was seen in the clinic for smoking cessation follow up. She states she smokes 20 cigarettes per day. Extra stress with her family had her smoking more than our last visit. Discussed breathing exercises. Per Smokerlyzer CO 16 ppm, last smoked 30 minutes prior to meeting. The patient remains on the prescribed tobacco cessation medication regimen of 1 mg Chantix bid and 21 mg patches without any negative side effects at this time. Session Focus: Completion of TCRS (Tobacco Cessation Rating Scale) reviewed strategies, habitual behavior, stress, and high risk situations. Introduced stress with addition interventions, SOLVE, relaxation with interventions, nutrition, exercise, weight gain, and the importance of rewarding oneself for accomplishments toward becoming tobacco free. Open discussion of all items with interventions. GOALS: 1. Ration out 10 cpd. 2. Breathing exercises. The patient denies any abnormal behavioral or mental changes at this time. The patient will continue with  therapy sessions and medication monitoring by CTTS. Prescribed medication management will be by physician.    Diagnosis: F17.210    Next Visit: 2 weeks

## 2024-02-28 NOTE — Clinical Note
The patient was seen in the clinic for smoking cessation follow up. She states she smokes 20 cigarettes per day. Extra stress with her family had her smoking more than our last visit. Discussed breathing exercises. Per Smokerlyzer CO 16 ppm, last smoked 30 minutes prior to meeting. The patient remains on the prescribed tobacco cessation medication regimen of 1 mg Chantix bid and 21 mg patches without any negative side effects at this time. Session Focus: Completion of TCRS (Tobacco Cessation Rating Scale) reviewed strategies, habitual behavior, stress, and high risk situations. Introduced stress with addition interventions, SOLVE, relaxation with interventions, nutrition, exercise, weight gain, and the importance of rewarding oneself for accomplishments toward becoming tobacco free. Open discussion of all items with interventions. GOALS: 1. Ration out 10 cpd. 2. Breathing exercises. The patient denies any abnormal behavioral or mental changes at this time.

## 2024-03-13 ENCOUNTER — PATIENT MESSAGE (OUTPATIENT)
Dept: ADMINISTRATIVE | Facility: HOSPITAL | Age: 54
End: 2024-03-13
Payer: COMMERCIAL

## 2024-03-13 ENCOUNTER — CLINICAL SUPPORT (OUTPATIENT)
Dept: SMOKING CESSATION | Facility: CLINIC | Age: 54
End: 2024-03-13
Payer: COMMERCIAL

## 2024-03-13 DIAGNOSIS — F17.200 NICOTINE DEPENDENCE: Primary | ICD-10-CM

## 2024-03-13 PROCEDURE — 99999 PR PBB SHADOW E&M-EST. PATIENT-LVL II: CPT | Mod: PBBFAC,,,

## 2024-03-13 PROCEDURE — 99402 PREV MED CNSL INDIV APPRX 30: CPT | Mod: S$GLB,,,

## 2024-03-13 NOTE — PROGRESS NOTES
"Individual Follow-Up Form    3/13/2024    Quit Date: tbd    Clinical Status of Patient: Outpatient    Length of Service: 30 minutes    Continuing Medication: yes  Chantix or Patches    Other Medications: none     Target Symptoms: Withdrawal and medication side effects. The following were  rated moderate (3) to severe (4) on TCRS:  Moderate (3): none  Severe (4): none    Comments: The patient was seen in the clinic for smoking cessation follow up. She states she smokes 10-15 cigarettes per day. Per Smokerlyzer CO14 ppm, last smoked 1 hour prior to meeting. The patient remains on the prescribed tobacco cessation medication regimen of 1 mg Chantix and 21 mg patches (doubled) without any negative side effects at this time. She states in the past is was easier to quit smoking. She admits to part of this due to a good report on her lungs, so a part of her thinks "why quit". Discussed this, she knows she should quit but her motivation is low, but she's not giving up. Goals: ration out 13 cpd. 2. Breathing exercises. The patient denies any abnormal behavioral or mental changes at this time. The patient will continue with  therapy sessions and medication monitoring by CTTS. Prescribed medication management will be by physician.    Diagnosis: F17.210    Next Visit: 2 weeks    "

## 2024-03-13 NOTE — Clinical Note
"The patient was seen in the clinic for smoking cessation follow up. She states she smokes 10-15 cigarettes per day. Per Smokerlyzer CO14 ppm, last smoked 1 hour prior to meeting. The patient remains on the prescribed tobacco cessation medication regimen of 1 mg Chantix and 21 mg patches (doubled) without any negative side effects at this time. She states in the past is was easier to quit smoking. She admits to part of this due to a good report on her lungs, so a part of her thinks "why quit". Discussed this, she knows she should quit but her motivation is low, but she's not giving up. Goals: ration out 13 cpd. 2. Breathing exercises. The patient denies any abnormal behavioral or mental changes at this time. The patient will continue with  therapy sessions and medication monitoring by CTTS. Prescribed medication management will be by physician."

## 2024-03-28 ENCOUNTER — TELEPHONE (OUTPATIENT)
Dept: SMOKING CESSATION | Facility: CLINIC | Age: 54
End: 2024-03-28
Payer: COMMERCIAL

## 2024-03-28 DIAGNOSIS — F17.200 NICOTINE DEPENDENCE: ICD-10-CM

## 2024-03-28 RX ORDER — VARENICLINE TARTRATE 1 MG/1
1 TABLET, FILM COATED ORAL 2 TIMES DAILY
Qty: 56 TABLET | Refills: 0 | Status: SHIPPED | OUTPATIENT
Start: 2024-03-28

## 2024-03-28 RX ORDER — IBUPROFEN 200 MG
2 TABLET ORAL DAILY
Qty: 56 PATCH | Refills: 0 | Status: SHIPPED | OUTPATIENT
Start: 2024-03-28

## 2024-03-28 NOTE — TELEPHONE ENCOUNTER
Patient called to let me know she was almost out of her medication and our next visit is a week a way. Order Patches and Chantix.

## 2024-04-03 ENCOUNTER — TELEPHONE (OUTPATIENT)
Dept: SMOKING CESSATION | Facility: CLINIC | Age: 54
End: 2024-04-03
Payer: COMMERCIAL

## 2024-04-03 NOTE — TELEPHONE ENCOUNTER
Received a message from the patient that she thinks its not a good time to try and quit smoking and that she will be in touch with me when she needs.

## 2024-04-29 ENCOUNTER — PATIENT OUTREACH (OUTPATIENT)
Dept: ADMINISTRATIVE | Facility: HOSPITAL | Age: 54
End: 2024-04-29
Payer: COMMERCIAL

## 2024-04-29 NOTE — PROGRESS NOTES
Lab visit report: Patient has a lab visit scheduled on 5/2/24, diabetic orders linked to appointment.

## 2024-05-01 DIAGNOSIS — R80.9 TYPE 2 DIABETES MELLITUS WITH MICROALBUMINURIA, WITH LONG-TERM CURRENT USE OF INSULIN: ICD-10-CM

## 2024-05-01 DIAGNOSIS — Z79.4 TYPE 2 DIABETES MELLITUS WITH MICROALBUMINURIA, WITH LONG-TERM CURRENT USE OF INSULIN: ICD-10-CM

## 2024-05-01 DIAGNOSIS — E11.29 TYPE 2 DIABETES MELLITUS WITH MICROALBUMINURIA, WITH LONG-TERM CURRENT USE OF INSULIN: ICD-10-CM

## 2024-05-01 RX ORDER — TIRZEPATIDE 5 MG/.5ML
5 INJECTION, SOLUTION SUBCUTANEOUS
Qty: 2 ML | Refills: 11 | Status: SHIPPED | OUTPATIENT
Start: 2024-05-01 | End: 2025-05-01

## 2024-05-01 NOTE — TELEPHONE ENCOUNTER
LOV 11/02/2023  Scheduled 05/09/2024   Procedure: excision sebaceous cyst of the left thigh and right buttock.  Location: left thigh and buttocks    I recommend excision. Procedure and the risks and benefits were explained including bleeding and infection. The patient understands these and wishes to proceed.     The patient was brought to the procedure room. Consent and time out were performed.  Initially the left thigh area was prepped and draped in the usual fashion. 1% lidocaine with epinephrine was infused locally. An ellyptical incision was made around the lesion. Full thickness excision was performed. The lesion size was 1.8 cm. The wound was closed in layers with interrupted simple vicryl and skin glue for the skin.  Wound closure was 2.5 cm.  The left buttock excision was performed in a similar manner.  The lesion measured 2.2 cm.  Wound was closed in layers in a similar fashion.  Wound closure size was 3 cm. There were no complications and the patient tolerated the procedure well. Hemostasis was well controlled with pressure and there was minimal blood loss. Wound instructions were given.  I will have the patient follow-up with me only as needed.

## 2024-05-08 DIAGNOSIS — Z12.31 OTHER SCREENING MAMMOGRAM: ICD-10-CM

## 2024-05-13 ENCOUNTER — TELEPHONE (OUTPATIENT)
Dept: NEPHROLOGY | Facility: CLINIC | Age: 54
End: 2024-05-13
Payer: COMMERCIAL

## 2024-05-13 DIAGNOSIS — R80.9 TYPE 2 DIABETES MELLITUS WITH MICROALBUMINURIA, WITH LONG-TERM CURRENT USE OF INSULIN: ICD-10-CM

## 2024-05-13 DIAGNOSIS — R80.1 PERSISTENT PROTEINURIA: Primary | ICD-10-CM

## 2024-05-13 DIAGNOSIS — Z79.4 TYPE 2 DIABETES MELLITUS WITH MICROALBUMINURIA, WITH LONG-TERM CURRENT USE OF INSULIN: ICD-10-CM

## 2024-05-13 DIAGNOSIS — E11.59 HYPERTENSION ASSOCIATED WITH DIABETES: ICD-10-CM

## 2024-05-13 DIAGNOSIS — I15.2 HYPERTENSION ASSOCIATED WITH DIABETES: ICD-10-CM

## 2024-05-13 DIAGNOSIS — E11.29 TYPE 2 DIABETES MELLITUS WITH MICROALBUMINURIA, WITH LONG-TERM CURRENT USE OF INSULIN: ICD-10-CM

## 2024-05-13 RX ORDER — SPIRONOLACTONE 25 MG/1
25 TABLET ORAL DAILY
Qty: 30 TABLET | Refills: 3 | Status: SHIPPED | OUTPATIENT
Start: 2024-05-13 | End: 2025-05-13

## 2024-05-13 RX ORDER — LISINOPRIL 20 MG/1
20 TABLET ORAL DAILY
Qty: 90 TABLET | Refills: 0 | Status: SHIPPED | OUTPATIENT
Start: 2024-05-13

## 2024-05-13 RX ORDER — ROSUVASTATIN CALCIUM 40 MG/1
40 TABLET, COATED ORAL DAILY
Qty: 90 TABLET | Refills: 0 | Status: SHIPPED | OUTPATIENT
Start: 2024-05-13

## 2024-05-13 NOTE — TELEPHONE ENCOUNTER
----- Message from Candelaria Galvan sent at 5/13/2024  3:01 PM CDT -----  Contact: self  [phone]  Patient called in this afternoon requesting a refill on spironolactone (ALDACTONE) 25 MG tablet. Please call back  [phone]. Thanks [facility] - [place] - [address] [facility]  [address] [facility] [address]  Phone: [phone] Fax: [phone]

## 2024-05-13 NOTE — TELEPHONE ENCOUNTER
Refilled Aldactone however she is due for Nephrology follow up, last seen 6/2023. Please RTC next available with labs prior orders in [place]

## 2024-05-13 NOTE — TELEPHONE ENCOUNTER
----- Message from Logan Dillard sent at 5/13/2024 12:07 PM CDT -----  Contact: Yarely Pritchett is requesting that her prescriptions be faxed over to [facility]'s pharmacy, the fax number is [phone]. If needed give her a call back at [phone]

## 2024-05-17 ENCOUNTER — PATIENT OUTREACH (OUTPATIENT)
Dept: ADMINISTRATIVE | Facility: HOSPITAL | Age: 54
End: 2024-05-17
Payer: COMMERCIAL

## 2024-05-17 NOTE — PROGRESS NOTES
No answer, LVM & sent Portal Msg. Linked overdue labs to upcoming Lab Appt.    VBHM Score: 4     Colon Cancer Screening  Eye Exam  Hemoglobin A1c  Mammogram                   Health Maintenance Topic(s) Outreach Outcomes & Actions Taken:    Lab(s) - Outreach Outcomes & Actions Taken  : Overdue Lab(s) Scheduled         Additional Notes:                 Care Management, Digital Medicine, and/or Education Referrals    OPCM Risk Score: 55.1                 Additional Notes:

## 2024-06-01 NOTE — TELEPHONE ENCOUNTER
Recommend Mucinex DM over-the-counter, Tylenol.  Antibiotic would not be recommended for cold.   SEE HPI     All other ROS negative unless otherwise specified in HPI.

## 2024-06-04 ENCOUNTER — TELEPHONE (OUTPATIENT)
Dept: DIABETES | Facility: CLINIC | Age: 54
End: 2024-06-04
Payer: COMMERCIAL

## 2024-06-04 NOTE — TELEPHONE ENCOUNTER
CaseId:[id number];Status:Approved;Review Type:Prior Auth;Coverage Start Date:06/04/2024;Coverage End Date:06/04/2025;  for Mounjaro 5 mg

## 2024-06-05 ENCOUNTER — TELEPHONE (OUTPATIENT)
Dept: FAMILY MEDICINE | Facility: CLINIC | Age: 54
End: 2024-06-05
Payer: COMMERCIAL

## 2024-06-05 ENCOUNTER — OFFICE VISIT (OUTPATIENT)
Dept: FAMILY MEDICINE | Facility: CLINIC | Age: 54
End: 2024-06-05
Payer: COMMERCIAL

## 2024-06-05 DIAGNOSIS — I95.1 ORTHOSTASIS: Primary | ICD-10-CM

## 2024-06-05 PROCEDURE — 4010F ACE/ARB THERAPY RXD/TAKEN: CPT | Mod: CPTII,95,, | Performed by: FAMILY MEDICINE

## 2024-06-05 PROCEDURE — 1159F MED LIST DOCD IN RCRD: CPT | Mod: CPTII,95,, | Performed by: FAMILY MEDICINE

## 2024-06-05 PROCEDURE — 3072F LOW RISK FOR RETINOPATHY: CPT | Mod: CPTII,95,, | Performed by: FAMILY MEDICINE

## 2024-06-05 PROCEDURE — 99212 OFFICE O/P EST SF 10 MIN: CPT | Mod: 95,,, | Performed by: FAMILY MEDICINE

## 2024-06-05 PROCEDURE — 1160F RVW MEDS BY RX/DR IN RCRD: CPT | Mod: CPTII,95,, | Performed by: FAMILY MEDICINE

## 2024-06-05 NOTE — TELEPHONE ENCOUNTER
----- Message from Alma aBnuelos sent at 6/5/2024  9:57 AM CDT -----  Contact: kenroy  The pt is needing a call back in regards to some concerns. Please give a call back at [phone]

## 2024-06-05 NOTE — TELEPHONE ENCOUNTER
Spoke w/ pt . She said that since Saturday she has been dizzy and her feet feel like she is walking in [place] . I sched an Audio appt w/ Dr Zelaya this morning at 10:20 am

## 2024-06-05 NOTE — PROGRESS NOTES
The patient location is: Home  The chief complaint leading to consultation is:Lightheaded  Visit type: Virtual visit with synchronous audio   Total time spent with patient: 15 minutes  Each patient to whom he or she provides medical services by telemedicine is:  (1) informed of the relationship between the physician and patient and the respective role of any other health care provider with respect to management of the patient; and (2) notified that he or she may decline to receive medical services by telemedicine and may withdraw from such care at any time.    Notes:   IMP: Poss orthostatic hypotension  Plan: Lying sitting and standing BP and precautions  Incr Na and fluids,if no better hold minipress  Any worsening or persistence immediate in person evaluation.  Yarely Thompson presents with moderate lightheadedness when rising quickly and walking associated with leg weakness,intermittently p 5 days. No syncope.No chest pain or dyspnea Denies nausea,vomiting,diarrhea or significant fever. No change in medication p year but new looking generic of minipress last week (for nightmares)    Past Medical History:   Diagnosis Date    Anxiety     Cholelithiasis     Depression     Fatty liver     HTN (hypertension)     Hyperlipidemia     Proteinuria     Dr. Sadler    Severe obesity (BMI 35.0-35.9 with comorbidity) 3/16/2018    ADEN (stress urinary incontinence, female)     Type 2 diabetes mellitus with hypertriglyceridemia 6/6/2022    Type 2 diabetes mellitus with microalbuminuria, with long-term current use of insulin 7/22/2016    Vitamin D insufficiency      History reviewed. No pertinent surgical history.  Review of patient's allergies indicates:   Allergen Reactions    Pcn [penicillins] Rash     Current Outpatient Medications on File Prior to Visit   Medication Sig Dispense Refill    ARIPiprazole (ABILIFY) 5 MG Tab Take 1 tablet (5 mg total) by mouth once daily. 30 tablet 0    azithromycin (Z-KVNG) 250 MG tablet Take 2 tabs  "today then one tab daily for 4 days 6 tablet 0    blood-glucose meter kit Use as directed      blood-glucose sensor (FREESTYLE VIRGILIO 3 SENSOR) Dana 1 each by Misc.(Non-Drug; Combo Route) route once daily. 2 each 11    clonazePAM (KLONOPIN) 0.5 MG tablet Take 1 tablet (0.5 mg total) by mouth every evening. 30 tablet 0    fenofibrate (TRICOR) 48 MG tablet Take 1 tablet (48 mg total) by mouth once daily. 90 tablet 1    insulin (LANTUS SOLOSTAR U-100 INSULIN) glargine 100 units/mL SubQ pen Inject 36 Units into the skin once daily. 32.4 mL 3    insulin aspart U-100 (NOVOLOG FLEXPEN U-100 INSULIN) 100 unit/mL (3 mL) InPn pen Inject 20 Units into the skin 3 (three) times daily before meals. +scale.<200=+0u, 201-250=+2u,251-300=+4u, 301-350=+6u, >350=+8u. Max 84 units/day 27 mL 5    lisinopriL (PRINIVIL,ZESTRIL) 20 MG tablet Take 1 tablet (20 mg total) by mouth once daily. 90 tablet 0    nicotine (NICODERM CQ) 21 mg/24 hr Place 2 patches onto the skin once daily. Dose appropriate. 56 patch 0    nicotine, polacrilex, 2 mg lzmn Take 1 lozenge (2 mg total) by mouth as needed (as needed). Dispense MINIS please ONLY. DO NOT CHEW UP. Can take 1-2 per hour in place of a cigarette for breakthrough cravings in place of a cigarette. (Patient not taking: Reported on 2/7/2024) 81 each 0    prazosin (MINIPRESS) 2 MG Cap Take 2 mg by mouth every evening.      rosuvastatin (CRESTOR) 40 MG Tab Take 1 tablet (40 mg total) by mouth once daily. 90 tablet 0    sertraline (ZOLOFT) 100 MG tablet Take 2 tablets (200 mg total) by mouth once daily. 60 tablet 0    spironolactone (ALDACTONE) 25 MG tablet Take 1 tablet (25 mg total) by mouth once daily. 30 tablet 3    SURE COMFORT PEN NEEDLE 31 gauge x 3/16" Ndle USE TO INJECT INSULINS FOUR TIMES DAILY 100 each 5    tirzepatide (MOUNJARO) 5 mg/0.5 mL PnIj Inject 5 mg into the skin every 7 days. 2 mL 11    varenicline (CHANTIX) 1 mg Tab Take 1 tablet (1 mg total) by mouth 2 (two) times daily. 56 tablet " 0     No current facility-administered medications on file prior to visit.     Social History     Socioeconomic History    Marital status:    Tobacco Use    Smoking status: Every Day     Current packs/day: 0.00     Average packs/day: 1.5 packs/day for 28.0 years (42.0 ttl pk-yrs)     Types: Cigarettes     Start date: 1994     Last attempt to quit: 2022     Years since quittin.8     Passive exposure: Current    Smokeless tobacco: Never   Substance and Sexual Activity    Alcohol use: No     Family History   Problem Relation Name Age of Onset    Hypertension Mother      Diabetes Mother      Lymphoma Father      Hypertension Father      Diabetes Father      Heart disease Brother  57    Breast cancer Maternal Aunt      Ovarian cancer Maternal Grandmother      Kidney disease Neg Hx      Glaucoma Neg Hx      Macular degeneration Neg Hx      Retinal detachment Neg Hx           ROS:  SKIN: No rashes, itching or changes in color or texture of skin.  EYES: Visual acuity fine. No photophobia, ocular pain or diplopia.EARS: Denies ear pain, discharge or vertigo.NOSE: No loss of smell, no epistaxis ,postnasal drip.MOUTH & THROAT: No hoarseness or change in voice. No excessive gum bleeding.CHEST: Denies VELÁSQUEZ, cyanosis, wheezing  CARDIOVASCULAR: Denies chest pain, PND, orthopnea or reduced exercise tolerance.  ABDOMEN:  No weight loss.No abdominal pain, no hematemesis or blood in stool.  URINARY: No flank pain, dysuria or hematuria.  PERIPHERAL VASCULAR: No claudication or cyanosis.  NEUROLOGIC: No history of seizures, paralysis, alteration of gait or coordination.    PE:   Chest:No tachypnea. No wheezing, rhonchi on forced expiration  Abdomen:Soft, non tender to patient palpation

## 2024-06-17 ENCOUNTER — PATIENT OUTREACH (OUTPATIENT)
Dept: ADMINISTRATIVE | Facility: HOSPITAL | Age: 54
End: 2024-06-17
Payer: COMMERCIAL

## 2024-06-20 ENCOUNTER — OFFICE VISIT (OUTPATIENT)
Dept: OPTOMETRY | Facility: CLINIC | Age: 54
End: 2024-06-20
Payer: COMMERCIAL

## 2024-06-20 DIAGNOSIS — H52.4 BILATERAL PRESBYOPIA: ICD-10-CM

## 2024-06-20 DIAGNOSIS — E11.3293 MILD NONPROLIFERATIVE DIABETIC RETINOPATHY OF BOTH EYES WITHOUT MACULAR EDEMA ASSOCIATED WITH TYPE 2 DIABETES MELLITUS: Primary | ICD-10-CM

## 2024-06-20 PROCEDURE — 92015 DETERMINE REFRACTIVE STATE: CPT | Mod: S$GLB,,, | Performed by: OPTOMETRIST

## 2024-06-20 PROCEDURE — 2022F DILAT RTA XM EVC RTNOPTHY: CPT | Mod: CPTII,S$GLB,, | Performed by: OPTOMETRIST

## 2024-06-20 PROCEDURE — 92134 CPTRZ OPH DX IMG PST SGM RTA: CPT | Mod: S$GLB,,, | Performed by: OPTOMETRIST

## 2024-06-20 PROCEDURE — 1159F MED LIST DOCD IN RCRD: CPT | Mod: CPTII,S$GLB,, | Performed by: OPTOMETRIST

## 2024-06-20 PROCEDURE — 99999 PR PBB SHADOW E&M-EST. PATIENT-LVL III: CPT | Mod: PBBFAC,,, | Performed by: OPTOMETRIST

## 2024-06-20 PROCEDURE — 92014 COMPRE OPH EXAM EST PT 1/>: CPT | Mod: S$GLB,,, | Performed by: OPTOMETRIST

## 2024-06-20 PROCEDURE — 1160F RVW MEDS BY RX/DR IN RCRD: CPT | Mod: CPTII,S$GLB,, | Performed by: OPTOMETRIST

## 2024-06-20 PROCEDURE — 4010F ACE/ARB THERAPY RXD/TAKEN: CPT | Mod: CPTII,S$GLB,, | Performed by: OPTOMETRIST

## 2024-06-20 NOTE — PROGRESS NOTES
HPI     Diabetic Eye Exam     Additional comments: Blur ou at dist, x mos, no assoc pain or red, no   relief over time, constant           Comments    Pt here for annual DM eye exam DLS- 04/14/23    Pt states her vision has decreased DVA and near, wearing OTC +3.50.   DM has been high on meds.   BP is stable on meds.   Denies F/F and GTTS.    Hemoglobin A1C       Date                     Value               Ref Range             Status                09/14/2023               9.0 (H)             4.0 - 5.6 %           Final                        Last edited by Nasim Mendes, OD on 6/20/2024 10:49 AM.            Assessment /Plan     For exam results, see Encounter Report.    Mild nonproliferative diabetic retinopathy of both eyes without macular edema associated with type 2 diabetes mellitus  -     OCT- Retina    Bilateral presbyopia      Mild diabetic retinopathy, no csme. OCT mac normal, Return in 1 year for dilated eye exam.  2. New Spectacle Rx given, discussed different options for glasses. RTC 1 year routine eye exam.

## 2024-06-24 ENCOUNTER — TELEPHONE (OUTPATIENT)
Dept: FAMILY MEDICINE | Facility: CLINIC | Age: 54
End: 2024-06-24
Payer: COMMERCIAL

## 2024-06-24 NOTE — TELEPHONE ENCOUNTER
I spoke w/ pt , she said that the NP that she sees did some blood work and said that her vit D level and her platelet count was low . I told her that she needs to schedule an appt and discuss with Dr Ayon . She agreed and said that she will sched an appt .

## 2024-06-24 NOTE — TELEPHONE ENCOUNTER
----- Message from Diamone Speed sent at 6/24/2024  3:56 PM CDT -----  Regarding: self  Type: Patient Call Back       Who called: self        What is the request in detail: wouldl like a call back        Can the clinic reply by [facility]? Yes       Would the patient rather a call back or a response via [facility]? Call back       Best call back number:[phone]

## 2024-07-05 ENCOUNTER — TELEPHONE (OUTPATIENT)
Dept: FAMILY MEDICINE | Facility: CLINIC | Age: 54
End: 2024-07-05
Payer: COMMERCIAL

## 2024-07-05 ENCOUNTER — PATIENT MESSAGE (OUTPATIENT)
Dept: FAMILY MEDICINE | Facility: CLINIC | Age: 54
End: 2024-07-05
Payer: COMMERCIAL

## 2024-07-05 NOTE — TELEPHONE ENCOUNTER
----- Message from Diamone Speed sent at 7/5/2024  9:43 AM CDT -----  Regarding: self  Type: Patient Call Back       Who called: self        What is the request in detail:        Can the clinic reply by [facility]?      Would the patient rather a call back or a response via [facility]?      Best call back number:    Additional Information:

## 2024-07-23 ENCOUNTER — PATIENT OUTREACH (OUTPATIENT)
Dept: ADMINISTRATIVE | Facility: HOSPITAL | Age: 54
End: 2024-07-23
Payer: COMMERCIAL

## 2024-07-29 ENCOUNTER — PATIENT OUTREACH (OUTPATIENT)
Dept: ADMINISTRATIVE | Facility: HOSPITAL | Age: 54
End: 2024-07-29
Payer: COMMERCIAL

## 2024-08-01 ENCOUNTER — PATIENT MESSAGE (OUTPATIENT)
Dept: ADMINISTRATIVE | Facility: HOSPITAL | Age: 54
End: 2024-08-01
Payer: COMMERCIAL

## 2024-08-12 ENCOUNTER — PATIENT OUTREACH (OUTPATIENT)
Dept: ADMINISTRATIVE | Facility: HOSPITAL | Age: 54
End: 2024-08-12
Payer: COMMERCIAL

## 2024-08-14 NOTE — TELEPHONE ENCOUNTER
Care Due:                  Date            Visit Type   Department     Provider  --------------------------------------------------------------------------------                                VIRTUAL      [facility] FAMILY  Last Visit: 06-      AUDIO ONLY   MEDICINE       Saud Zelaya  Next Visit: None Scheduled  None         None Found                                                            Last  Test          Frequency    Reason                     Performed    Due Date  --------------------------------------------------------------------------------    CBC.........  12 months..  fenofibrate..............  Not Found    Overdue    CMP.........  12 months..  fenofibrate, insulin,      06- 06-                             lisinopriL, rosuvastatin,                             varenicline..............    HBA1C.......  6 months...  insulin..................  09- 03-    Lipid Panel.  12 months..  fenofibrate, rosuvastatin  06- 06-    [facility] Embedded Care Due Messages. Reference number: [id number].   8/14/2024 3:47:51 PM CDT

## 2024-08-15 ENCOUNTER — TELEPHONE (OUTPATIENT)
Dept: FAMILY MEDICINE | Facility: CLINIC | Age: 54
End: 2024-08-15
Payer: COMMERCIAL

## 2024-08-15 RX ORDER — ROSUVASTATIN CALCIUM 40 MG/1
40 TABLET, COATED ORAL DAILY
Qty: 90 TABLET | Refills: 0 | Status: SHIPPED | OUTPATIENT
Start: 2024-08-15

## 2024-08-15 NOTE — TELEPHONE ENCOUNTER
Per patient, her psychiatrist wants her to take propranolol 10mg, po, bid as needed for anxiety, is this ok to take with her other medications?  If ok I will add to med card

## 2024-08-15 NOTE — TELEPHONE ENCOUNTER
This can affect blood pressure and pulse.  It looks like she had some issue with this with a video visit with Dr. Zelaya recently.  I would recommend that we see her in the office for an appointment before adding additional medication.

## 2024-08-15 NOTE — TELEPHONE ENCOUNTER
Refill Routing Note   Medication(s) are not appropriate for processing by [facility] for the following reason(s):      Required labs outdated    [facility] action(s):  Defer Care Due:  None identified     Medication Therapy Plan: FLOS      Appointments  past 12m or future 3m with PCP    Date Provider   Last Visit   9/14/2023 Mikey Ayon MD   Next Visit   Visit date not found Mikey Ayon MD   ED visits in past 90 days: 0        Note composed:7:19 PM 08/14/2024

## 2024-08-15 NOTE — TELEPHONE ENCOUNTER
----- Message from Emma Zelaya sent at 8/15/2024  9:26 AM CDT -----    Patient Returning Call        Who Called:pt  Does the patient know what this is regarding?:asking nurse to call have a question about a medication if pt can take something another doctor issued  Would the patient rather a call back or a response via [facility]? call  Best Call Back Number:[phone]  Additional Information: call back

## 2024-08-19 ENCOUNTER — OFFICE VISIT (OUTPATIENT)
Dept: FAMILY MEDICINE | Facility: CLINIC | Age: 54
End: 2024-08-19
Payer: COMMERCIAL

## 2024-08-19 ENCOUNTER — HOSPITAL ENCOUNTER (OUTPATIENT)
Dept: RADIOLOGY | Facility: HOSPITAL | Age: 54
Discharge: HOME | End: 2024-08-19
Attending: FAMILY MEDICINE
Payer: COMMERCIAL

## 2024-08-19 VITALS
HEIGHT: 65 IN | SYSTOLIC BLOOD PRESSURE: 110 MMHG | HEART RATE: 84 BPM | WEIGHT: 176.38 LBS | BODY MASS INDEX: 29.38 KG/M2 | OXYGEN SATURATION: 98 % | DIASTOLIC BLOOD PRESSURE: 68 MMHG

## 2024-08-19 DIAGNOSIS — D69.6 THROMBOCYTOPENIA: Primary | ICD-10-CM

## 2024-08-19 DIAGNOSIS — K76.0 FATTY LIVER: ICD-10-CM

## 2024-08-19 DIAGNOSIS — R16.2 HEPATOSPLENOMEGALY: ICD-10-CM

## 2024-08-19 DIAGNOSIS — R74.8 ELEVATED ALKALINE PHOSPHATASE LEVEL: ICD-10-CM

## 2024-08-19 DIAGNOSIS — E11.69 HYPERLIPIDEMIA ASSOCIATED WITH TYPE 2 DIABETES MELLITUS: ICD-10-CM

## 2024-08-19 DIAGNOSIS — I15.2 HYPERTENSION ASSOCIATED WITH DIABETES: ICD-10-CM

## 2024-08-19 DIAGNOSIS — E78.5 HYPERLIPIDEMIA ASSOCIATED WITH TYPE 2 DIABETES MELLITUS: ICD-10-CM

## 2024-08-19 DIAGNOSIS — E55.9 VITAMIN D INSUFFICIENCY: ICD-10-CM

## 2024-08-19 DIAGNOSIS — F17.200 SMOKING: ICD-10-CM

## 2024-08-19 DIAGNOSIS — E11.29 TYPE 2 DIABETES MELLITUS WITH MICROALBUMINURIA, WITH LONG-TERM CURRENT USE OF INSULIN: ICD-10-CM

## 2024-08-19 DIAGNOSIS — Z00.00 ROUTINE HISTORY AND PHYSICAL EXAMINATION OF ADULT: ICD-10-CM

## 2024-08-19 DIAGNOSIS — R80.9 TYPE 2 DIABETES MELLITUS WITH MICROALBUMINURIA, WITH LONG-TERM CURRENT USE OF INSULIN: ICD-10-CM

## 2024-08-19 DIAGNOSIS — Z12.11 SCREENING FOR COLON CANCER: ICD-10-CM

## 2024-08-19 DIAGNOSIS — E11.65 TYPE 2 DIABETES MELLITUS WITH HYPERGLYCEMIA, WITH LONG-TERM CURRENT USE OF INSULIN: ICD-10-CM

## 2024-08-19 DIAGNOSIS — Z79.4 TYPE 2 DIABETES MELLITUS WITH MICROALBUMINURIA, WITH LONG-TERM CURRENT USE OF INSULIN: ICD-10-CM

## 2024-08-19 DIAGNOSIS — E11.59 HYPERTENSION ASSOCIATED WITH DIABETES: ICD-10-CM

## 2024-08-19 DIAGNOSIS — D69.6 THROMBOCYTOPENIA: ICD-10-CM

## 2024-08-19 DIAGNOSIS — F32.5 MAJOR DEPRESSIVE DISORDER IN REMISSION, UNSPECIFIED WHETHER RECURRENT: ICD-10-CM

## 2024-08-19 DIAGNOSIS — Z12.31 ENCOUNTER FOR SCREENING MAMMOGRAM FOR MALIGNANT NEOPLASM OF BREAST: ICD-10-CM

## 2024-08-19 DIAGNOSIS — Z79.4 TYPE 2 DIABETES MELLITUS WITH HYPERGLYCEMIA, WITH LONG-TERM CURRENT USE OF INSULIN: ICD-10-CM

## 2024-08-19 DIAGNOSIS — F41.1 GAD (GENERALIZED ANXIETY DISORDER): ICD-10-CM

## 2024-08-19 PROCEDURE — 3008F BODY MASS INDEX DOCD: CPT | Mod: CPTII,S$GLB,, | Performed by: FAMILY MEDICINE

## 2024-08-19 PROCEDURE — 1159F MED LIST DOCD IN RCRD: CPT | Mod: CPTII,S$GLB,, | Performed by: FAMILY MEDICINE

## 2024-08-19 PROCEDURE — 76700 US EXAM ABDOM COMPLETE: CPT | Mod: 26,,, | Performed by: RADIOLOGY

## 2024-08-19 PROCEDURE — 3078F DIAST BP <80 MM HG: CPT | Mod: CPTII,S$GLB,, | Performed by: FAMILY MEDICINE

## 2024-08-19 PROCEDURE — 76700 US EXAM ABDOM COMPLETE: CPT | Mod: TC,PO

## 2024-08-19 PROCEDURE — 3074F SYST BP LT 130 MM HG: CPT | Mod: CPTII,S$GLB,, | Performed by: FAMILY MEDICINE

## 2024-08-19 PROCEDURE — 4010F ACE/ARB THERAPY RXD/TAKEN: CPT | Mod: CPTII,S$GLB,, | Performed by: FAMILY MEDICINE

## 2024-08-19 PROCEDURE — 99999 PR PBB SHADOW E&M-EST. PATIENT-LVL V: CPT | Mod: PBBFAC,,, | Performed by: FAMILY MEDICINE

## 2024-08-19 PROCEDURE — 99213 OFFICE O/P EST LOW 20 MIN: CPT | Mod: 25,S$GLB,, | Performed by: FAMILY MEDICINE

## 2024-08-19 PROCEDURE — 99396 PREV VISIT EST AGE 40-64: CPT | Mod: S$GLB,,, | Performed by: FAMILY MEDICINE

## 2024-08-19 RX ORDER — ACETAMINOPHEN 500 MG
5000 TABLET ORAL DAILY
COMMUNITY

## 2024-08-19 NOTE — PROGRESS NOTES
Patient sees psychiatry regarding depression and anxiety who has proposed adding propranolol 10mg twice daily to her medication regimen for anxiety, pending approval from her PCP.  She had laboratory with them with low platelets 88 K and low vitamin D levels.  Sugar was significantly elevated over 400.  She just started taking vitamin D supplementsy. The patient denies excessive bleeding or bruising, though she reports a history of gum disease previously associated with bleeding during tooth brushing, which has since improved.  She does have previous fatty liver and has had some hepatosplenomegaly noted on previous imaging.  She did have a mild elevation of alkaline phosphatase with the lab work from her psychiatrist.  She denies alcohol use.  She has had poor control of her diabetes long-term.  She does have a follow-up appointment scheduled with diabetes nurse practitioner.  She has had significant proteinuria previously and is past due for follow-up with her nephrologist.    In addition she is due for physical exam.  Mammogram due. colon screening due.  Her blood pressure is controlled.    ROS:  Constitutional: +fatigue  Hematologic/Lymphatic: -easy bruising             Yarely was seen today for follow-up.    Diagnoses and all orders for this visit:    Thrombocytopenia  -     Platelet Count, Blue Top; Future  -     Protime-INR; Future  -     APTT; Future  -     US Abdomen Complete; Future    Fatty liver  -     Platelet Count, Blue Top; Future  -     Ambulatory referral/consult to Nephrology; Future  -     Protime-INR; Future  -     APTT; Future  -     US Abdomen Complete; Future  -     GAMMA GT; Future    Type 2 diabetes mellitus with microalbuminuria, with long-term current use of insulin    Hyperlipidemia associated with type 2 diabetes mellitus    Vitamin D insufficiency    Type 2 diabetes mellitus with hyperglycemia, with long-term current use of insulin    Hypertension associated with  diabetes    Smoking    Routine history and physical examination of adult    ANTWON (generalized anxiety disorder)    Major depressive disorder in remission, unspecified whether recurrent    Screening for colon cancer  -     Cologuard Screening (Multitarget Stool DNA); Future  -     Cologuard Screening (Multitarget Stool DNA)    Encounter for screening mammogram for malignant neoplasm of breast  -     Mammo Digital Screening Bilat w/ Esau; Future    Hepatosplenomegaly  -     US Abdomen Complete; Future    Elevated alkaline phosphatase level  -     GAMMA GT; Future      She already has laboratory scheduled to include CBC CMP lipid urine microalbumin protein creatinine ratio hemoglobin A1c today additional lab work as above.    LOW PLATELET COUNT:  Recheck platelets with blue top tube.  Likely will need follow-up with Hematology or hepatology.    VITAMIN D DEFICIENCY:  - Evaluated vitamin D deficiency from recent bloodwork.  - Ms. Thompson just started vitamin D 5000 IU daily.  FATTY LIVER DISEASE:  - Noted enlarged liver on physical exam, consistent with previous CT findings of fatty liver.  - Ordered abdominal ultrasound to evaluate liver enlargement.  DIABETES:  - Reviewed diabetes management, noting elevated blood sugar and need for closer monitoring.  - Discussed the importance of regular A1C monitoring for diabetes management.  - Recommend improving dietary habits to better manage blood sugar.  - Follow up on 9/5 as scheduled with diabetic nurse practitioner (Elida Machuca).  ANXIETY:  She can start propranolol as suggested by her psychiatrist.- Ms. Thompson to monitor blood pressure and pulse rate, especially after starting propranolol.  KIDNEY PROTECTION:  - Continued lisinopril at current dose to protect kidneys.  - Continued spironolactone at current dose.  Schedule follow-up nephrology (Dr.- Sadler).    COLON CANCER SCREENING:  - Ordered Cologuard test for colon cancer screening.  BREAST CANCER SCREENING:  -  "Ordered mammogram.  OTHER INSTRUCTIONS:  - Ms. Thompson to quit smoking.  FOLLOW UP:  - Follow up in a few weeks to report blood pressure and pulse readings after starting propranolol.  - Follow up to review labs results, particularly platelet count and liver function tests.  - Contact the office if experiencing any adverse effects from propranolol or worsening symptoms.               Diabetes Management Status    Statin: Taking  ACE/ARB: Taking    Screening or Prevention Patient's value Goal Complete/Controlled?   HgA1C Testing and Control   Lab Results   Component Value Date    HGBA1C 9.0 (H) 09/14/2023      Annually/Less than 8% No   Lipid profile : 06/09/2023 Annually No   LDL control Lab Results   Component Value Date    LDLCALC Invalid, Trig>400.0 06/09/2023    Annually/Less than 100 mg/dl  No   Nephropathy screening Lab Results   Component Value Date    LABMICR 2294.0 06/09/2023     No results found for: "PROTEINUA" Annually No   Blood pressure BP Readings from Last 1 Encounters:   08/19/24 110/68    Less than 140/90 Yes   Dilated retinal exam : 06/20/2024 Annually Yes   Foot exam   : 08/19/2024 Annually Yes       Past Medical History:  Past Medical History:   Diagnosis Date    Anxiety     Cholelithiasis     Depression     Fatty liver     HTN (hypertension)     Hyperlipidemia     Proteinuria     Dr. Sadler    Severe obesity (BMI 35.0-35.9 with comorbidity) 3/16/2018    ADEN (stress urinary incontinence, female)     Type 2 diabetes mellitus with hypertriglyceridemia 6/6/2022    Type 2 diabetes mellitus with microalbuminuria, with long-term current use of insulin 7/22/2016    Vitamin D insufficiency      History reviewed. No pertinent surgical history.  Review of patient's allergies indicates:   Allergen Reactions    Pcn [penicillins] Rash     Current Outpatient Medications on File Prior to Visit   Medication Sig Dispense Refill    ARIPiprazole (ABILIFY) 5 MG Tab Take 1 tablet (5 mg total) by mouth once daily. 30 " "tablet 0    blood-glucose meter kit Use as directed      blood-glucose sensor (FREESTYLE VIRGILIO 3 SENSOR) Dana 1 each by Misc.(Non-Drug; Combo Route) route once daily. 2 each 11    clonazePAM (KLONOPIN) 0.5 MG tablet Take 1 tablet (0.5 mg total) by mouth every evening. 30 tablet 0    insulin (LANTUS SOLOSTAR U-100 INSULIN) glargine 100 units/mL SubQ pen Inject 36 Units into the skin once daily. 32.4 mL 3    insulin aspart U-100 (NOVOLOG FLEXPEN U-100 INSULIN) 100 unit/mL (3 mL) InPn pen Inject 20 Units into the skin 3 (three) times daily before meals. +scale.<200=+0u, 201-250=+2u,251-300=+4u, 301-350=+6u, >350=+8u. Max 84 units/day 27 mL 5    lisinopriL (PRINIVIL,ZESTRIL) 20 MG tablet Take 1 tablet (20 mg total) by mouth once daily. 90 tablet 0    rosuvastatin (CRESTOR) 40 MG Tab Take 1 tablet (40 mg total) by mouth once daily. 90 tablet 0    sertraline (ZOLOFT) 100 MG tablet Take 2 tablets (200 mg total) by mouth once daily. 60 tablet 0    spironolactone (ALDACTONE) 25 MG tablet Take 1 tablet (25 mg total) by mouth once daily. 30 tablet 3    SURE COMFORT PEN NEEDLE 31 gauge x 3/16" Ndle USE TO INJECT INSULINS FOUR TIMES DAILY 100 each 5    tirzepatide (MOUNJARO) 5 mg/0.5 mL PnIj Inject 5 mg into the skin every 7 days. 2 mL 11    cholecalciferol, vitamin D3, 125 mcg (5,000 unit) Tab Take 5,000 Units by mouth once daily.      [DISCONTINUED] fenofibrate (TRICOR) 48 MG tablet Take 1 tablet (48 mg total) by mouth once daily. 90 tablet 1     No current facility-administered medications on file prior to visit.     Social History     Socioeconomic History    Marital status:    Tobacco Use    Smoking status: Every Day     Current packs/day: 0.00     Average packs/day: 1.5 packs/day for 28.0 years (42.0 ttl pk-yrs)     Types: Cigarettes     Start date: 1994     Last attempt to quit: 2022     Years since quittin.0     Passive exposure: Current    Smokeless tobacco: Never   Substance and Sexual Activity    " "Alcohol use: No    Drug use: Never    Sexual activity: Not Currently     Partners: Male     Birth control/protection: Condom     Social Determinants of Health     Food Insecurity: No Food Insecurity (8/16/2024)    Hunger Vital Sign     Worried About Running Out of Food in the Last Year: Never true     Ran Out of Food in the Last Year: Never true   Physical Activity: Unknown (8/16/2024)    Exercise Vital Sign     Days of Exercise per Week: 5 days   Housing Stability: Unknown (8/16/2024)    Housing Stability Vital Sign     Unable to Pay for Housing in the Last Year: No     Family History   Problem Relation Name Age of Onset    Hypertension Mother Janice Amador     Diabetes Mother Janice Amador     Asthma Mother Janice Amador     Lymphoma Father Clemente Amador     Hypertension Father Clemente Amador     Diabetes Father Clemente Amador     Cancer Father Clemente Amador     Heart disease Brother  57    Breast cancer Maternal Aunt      Ovarian cancer Maternal Grandmother      Early death Brother Roman Amador     Kidney disease Neg Hx      Glaucoma Neg Hx      Macular degeneration Neg Hx      Retinal detachment Neg Hx             ROS:  GENERAL: No fever, chills,  or significant weight changes.   CARDIOVASCULAR: Denies chest pain, PND, orthopnea or reduced exercise tolerance.  ABDOMEN: Appetite fine. Denies diarrhea, abdominal pain, hematemesis or blood in stool.  URINARY: No flank pain, dysuria or hematuria.      OBJECTIVE:   Vitals:    08/19/24 0749   BP: 110/68   Pulse: 84   SpO2: 98%   Weight: 80 kg (176 lb 6.4 oz)   Height: 5' 5" (1.651 m)     Wt Readings from Last 3 Encounters:   08/19/24 80 kg (176 lb 6.4 oz)   11/02/23 78.4 kg (172 lb 13.5 oz)   11/02/23 78.4 kg (172 lb 12.8 oz)       APPEARANCE: Well nourished, well developed, in no acute distress.   HEAD: Normocephalic. Atraumatic. No sinus tenderness.   EYES:   Right eye: Pupil reactive. Conjunctiva clear.   Left eye: Pupil reactive. Conjunctiva clear.   Both fundi: " Grossly normal to nondilated exam. EOMI.   EARS: TM's intact. Light reflex normal. No retraction or perforation.   NOSE: clear.   MOUTH & THROAT: No pharyngeal erythema or exudate. No lesions.   NECK: No bruits. No JVD. No cervical lymphadenopathy. No thyromegaly.   CHEST: Breath sounds clear bilaterally. Normal respiratory effort   CARDIOVASCULAR: Normal rate. Regular rhythm. No murmurs. No rub. No gallops.   ABDOMEN: Bowel sounds normal. Soft. No tenderness.  hepatomegaly noted with liver edge palpable several cm below costal margin  PERIPHERAL VASCULAR: No cyanosis. No clubbing. No edema.   NEUROLOGIC: No focal findings.    Feet:Sensation in the feet intact to monofilament testing.   No significant foot lesions.Pulses palpable.  MENTAL STATUS: Alert. Oriented x 3.

## 2024-08-20 ENCOUNTER — TELEPHONE (OUTPATIENT)
Dept: FAMILY MEDICINE | Facility: CLINIC | Age: 54
End: 2024-08-20
Payer: COMMERCIAL

## 2024-08-20 DIAGNOSIS — D69.6 THROMBOCYTOPENIA: Primary | ICD-10-CM

## 2024-08-20 DIAGNOSIS — R16.2 HEPATOSPLENOMEGALY: ICD-10-CM

## 2024-08-20 DIAGNOSIS — K76.0 FATTY LIVER: ICD-10-CM

## 2024-08-26 ENCOUNTER — TELEPHONE (OUTPATIENT)
Dept: HEPATOLOGY | Facility: CLINIC | Age: 54
End: 2024-08-26
Payer: COMMERCIAL

## 2024-08-26 ENCOUNTER — OFFICE VISIT (OUTPATIENT)
Dept: HEPATOLOGY | Facility: CLINIC | Age: 54
End: 2024-08-26
Payer: COMMERCIAL

## 2024-08-26 DIAGNOSIS — R16.2 HEPATOSPLENOMEGALY: ICD-10-CM

## 2024-08-26 DIAGNOSIS — K76.0 FATTY LIVER: ICD-10-CM

## 2024-08-26 DIAGNOSIS — D69.6 THROMBOCYTOPENIA: ICD-10-CM

## 2024-08-26 DIAGNOSIS — E66.3 OVERWEIGHT WITH BODY MASS INDEX (BMI) OF 29 TO 29.9 IN ADULT: Primary | ICD-10-CM

## 2024-08-26 PROCEDURE — 3066F NEPHROPATHY DOC TX: CPT | Mod: CPTII,95,, | Performed by: PHYSICIAN ASSISTANT

## 2024-08-26 PROCEDURE — 3062F POS MACROALBUMINURIA REV: CPT | Mod: CPTII,95,, | Performed by: PHYSICIAN ASSISTANT

## 2024-08-26 PROCEDURE — 1160F RVW MEDS BY RX/DR IN RCRD: CPT | Mod: CPTII,95,, | Performed by: PHYSICIAN ASSISTANT

## 2024-08-26 PROCEDURE — 99203 OFFICE O/P NEW LOW 30 MIN: CPT | Mod: 95,,, | Performed by: PHYSICIAN ASSISTANT

## 2024-08-26 PROCEDURE — 3046F HEMOGLOBIN A1C LEVEL >9.0%: CPT | Mod: CPTII,95,, | Performed by: PHYSICIAN ASSISTANT

## 2024-08-26 PROCEDURE — 4010F ACE/ARB THERAPY RXD/TAKEN: CPT | Mod: CPTII,95,, | Performed by: PHYSICIAN ASSISTANT

## 2024-08-26 PROCEDURE — 1159F MED LIST DOCD IN RCRD: CPT | Mod: CPTII,95,, | Performed by: PHYSICIAN ASSISTANT

## 2024-08-26 NOTE — TELEPHONE ENCOUNTER
An appointment has been scheduled on Wednesday, October 23, 2024 at 1:00PM (Fibroscan, [facility]).  F/U appointment has been scheduled on Monday, December 2, 2024 at 8:00AM.  Patient confirmed.

## 2024-08-26 NOTE — TELEPHONE ENCOUNTER
----- Message from Jennifer B. Scheuermann, PA sent at 8/26/2024 11:48 AM CDT -----  Pls schedule fibroscan in ~4-6 wks (Alpesh is fine)  Schedule visit w/ me (video is fine) in 2-3 months

## 2024-08-26 NOTE — PROGRESS NOTES
"HEPATOLOGY VIDEO VISIT NOTE   Visit type: Audiovisual     Each patient to whom he or she provides medical services by telemedicine is:  (1) informed of the relationship between the physician and patient and the respective role of any other health care provider with respect to management of the patient; and (2) notified that he or she may decline to receive medical services by telemedicine and may withdraw from such care at any time.     REFERRING PROVIDER: Mikey Ayon MD  CHIEF COMPLAINT: Fatty liver       HISTORY       This is a 54 y.o. White female referred for fatty liver.     Prior liver disease or icteric illness: No     Per EPIC  Liver panel: AST/ALT 50s-80s in past, in 30s since 2021  Platelets: low w/ overall downward trend (180s -> 140s -> 90s)     Risks for fatty liver:  Lipids: elevated, Trig > 900 in past --> Now 397 on crestor.  DM: poorly controlled. > HbA1c 12 couple yrs ago --> 9.9 (8/2024)  BMI: 29 now  Alcohol: denies      U/S 8/19/24: HM 22.4cm, increased echogenicity. Spleen 13.4cm. no ascites    Weight / diet history:  BMI 29 now (30-33 in past)  Began mounjaro 6/2023. Initial weight loss (179 -> 172) but now 176. Unable to tolerate higher dose due to nausea.   Admits to not following diet well. Often decides to "just eat what she wants and then take a shot"    Notes she has upcoming appt w/ diabetic educator     Screening for other liver disease:  HCVAB: neg (2019)  HBsAg: neg (2019)     Denies jaundice, dark urine, abdominal distention, hematemesis, melena, slowed mentation.        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  Alcohol - No  Drugs - No        ROS: as per HPI        PHYSICAL EXAM:  Friendly White female in no acute distress; alert and oriented to person, place and time  LUNGS: Normal respiratory effort.  NEURO/PSYCH: Memory intact. Thought and speech " pattern appropriate. Behavior normal. No depression or anxiety noted.        PERTINENT DIAGNOSTIC RESULTS      Lab Results   Component Value Date    WBC 8.58 08/19/2024    HGB 15.0 08/19/2024    PLT 95 (L) 08/19/2024    INR 1.1 08/19/2024       Lab Results   Component Value Date     (L) 08/19/2024     (L) 08/19/2024     (L) 08/19/2024    K 4.3 08/19/2024    K 4.3 08/19/2024    K 4.3 08/19/2024    BUN 12 08/19/2024    BUN 12 08/19/2024    BUN 12 08/19/2024    CREATININE 0.8 08/19/2024    CREATININE 0.8 08/19/2024    CREATININE 0.8 08/19/2024    ALBUMIN 4.4 08/19/2024    ALBUMIN 4.4 08/19/2024    ALKPHOS 99 08/19/2024    BILITOT 0.4 08/19/2024    AST 36 08/19/2024    ALT 39 08/19/2024     Results for orders placed during the hospital encounter of 08/19/24  US Abdomen Complete  CLINICAL HISTORY:Thrombocytopenia, unspecified  TECHNIQUE:Complete abdominal ultrasound (including pancreas, aorta, liver, gallbladder, common bile duct, IVC, kidneys, and spleen) was performed.  COMPARISON:None    FINDINGS:  Pancreas: The visualized portions of pancreas appear normal.  Aorta: No aneurysm.  Liver: 22.4 cm, enlarged.  Diffusely increased parenchymal echogenicity consistent with steatosis. Two cysts in the right lobe each measuring 13 mm.  Gallbladder: Gallstones and sludge are present.  No gallbladder wall thickening or pericholecystic fluid.  No sonographic Ibanez's sign.  Biliary system: 3.2 mm common bile duct.  No intrahepatic ductal dilatation.  Inferior vena cava: Normal in appearance.  Right kidney: 12.5 cm. No hydronephrosis.  Left kidney: 10.5 cm. No hydronephrosis.  Spleen: 13.4 cm, borderline enlarged.  Miscellaneous: No ascites.    Impression  1. Hepatomegaly and hepatic steatosis.  2. Small hepatic cysts.  3. Cholelithiasis without sonographic evidence of acute cholecystitis.  4. Borderline splenomegaly.       ASSESSMENT      54 y.o. White female with:  1. Steatotic liver disease (MASLD)  --  Transaminases: 30s  -- Staging: ? C/f advanced fibrosis based on low plt     2. Overweight w/ BMI 29     3. DM, poorly controlled, on mounjaro    4. Hyperlipidemia, on crestor     PLAN         1. FibroScan in 4-6 wks  2. Diet, exercise w/ goal of wt loss  - encouragement provided  - recommended food journal to evaluate both what she's eating & quantity, but also triggers  - high protein, low carb  3. F/U visit in 2-3 months     ___________________________________________________________________  EDUCATION:  Discussed significance of fatty liver disease, steatosis vs steatohepatitis, and risk of cirrhosis.  Discussed role of diet, maintaining ideal body weight in mngmt of fatty liver.  Discussed importance of managing blood sugar and lipids if elevated.  Discussed importance of minimizing alcohol.     __________________________________________________________________     Duration of encounter: 41  min  This includes face-to-face time and non face-to-face time preparing to see the patient (eg, review of tests), obtaining and/or reviewing separately obtained history, documenting clinical information in the electronic or other health record, independently interpreting resultsand communicating results to the patient/family/caregiver, or care coordination

## 2024-08-26 NOTE — Clinical Note
Pls schedule fibroscan in ~4-6 wks (Alpesh is fine) Schedule visit w/ me (video is fine) in 2-3 months

## 2024-08-29 DIAGNOSIS — Z79.4 TYPE 2 DIABETES MELLITUS WITH MICROALBUMINURIA, WITH LONG-TERM CURRENT USE OF INSULIN: ICD-10-CM

## 2024-08-29 DIAGNOSIS — R80.9 TYPE 2 DIABETES MELLITUS WITH MICROALBUMINURIA, WITH LONG-TERM CURRENT USE OF INSULIN: ICD-10-CM

## 2024-08-29 DIAGNOSIS — I15.2 HYPERTENSION ASSOCIATED WITH DIABETES: ICD-10-CM

## 2024-08-29 DIAGNOSIS — E11.59 HYPERTENSION ASSOCIATED WITH DIABETES: ICD-10-CM

## 2024-08-29 DIAGNOSIS — E11.29 TYPE 2 DIABETES MELLITUS WITH MICROALBUMINURIA, WITH LONG-TERM CURRENT USE OF INSULIN: ICD-10-CM

## 2024-08-30 RX ORDER — SPIRONOLACTONE 25 MG/1
25 TABLET ORAL DAILY
Qty: 90 TABLET | Refills: 1 | Status: SHIPPED | OUTPATIENT
Start: 2024-08-30 | End: 2025-08-30

## 2024-09-05 RX ORDER — LISINOPRIL 20 MG/1
20 TABLET ORAL DAILY
Qty: 90 TABLET | Refills: 3 | Status: SHIPPED | OUTPATIENT
Start: 2024-09-05

## 2024-09-05 NOTE — TELEPHONE ENCOUNTER
No care due was identified.  [facility] Embedded Care Due Messages. Reference number: [id number].   9/05/2024 11:26:25 AM CDT

## 2024-09-05 NOTE — TELEPHONE ENCOUNTER
Refill Decision Note   Yarely Thompson  is requesting a refill authorization.  Brief Assessment and Rationale for Refill:  Approve     Medication Therapy Plan:         Comments:     Note composed:3:47 PM 09/05/2024

## 2024-09-17 ENCOUNTER — TELEPHONE (OUTPATIENT)
Dept: FAMILY MEDICINE | Facility: CLINIC | Age: 54
End: 2024-09-17
Payer: COMMERCIAL

## 2024-09-17 DIAGNOSIS — D69.6 THROMBOCYTOPENIA: ICD-10-CM

## 2024-09-17 DIAGNOSIS — R93.5 ABNORMAL ABDOMINAL ULTRASOUND: ICD-10-CM

## 2024-09-17 DIAGNOSIS — K80.20 GALLSTONES: Primary | ICD-10-CM

## 2024-09-17 NOTE — TELEPHONE ENCOUNTER
Referral placed.  She needs to make sure that Dr. Jaquez is aware that she has low platelets before she has any surgery.

## 2024-09-17 NOTE — TELEPHONE ENCOUNTER
----- Message from Deshaun Smith MA sent at 9/17/2024  3:22 PM CDT -----  Contact: Yarely @ [phone]  The patient calling to get a Gall Bladder US results and referral faxed to Dr. Mateo Jaquez's office at [phone]. Please give her a call back at [phone].

## 2024-09-19 ENCOUNTER — OFFICE VISIT (OUTPATIENT)
Dept: DIABETES | Facility: CLINIC | Age: 54
End: 2024-09-19
Payer: COMMERCIAL

## 2024-09-19 ENCOUNTER — TELEPHONE (OUTPATIENT)
Dept: SMOKING CESSATION | Facility: CLINIC | Age: 54
End: 2024-09-19
Payer: COMMERCIAL

## 2024-09-19 VITALS
HEART RATE: 70 BPM | SYSTOLIC BLOOD PRESSURE: 110 MMHG | BODY MASS INDEX: 29.43 KG/M2 | HEIGHT: 65 IN | DIASTOLIC BLOOD PRESSURE: 60 MMHG | WEIGHT: 176.63 LBS

## 2024-09-19 DIAGNOSIS — Z79.4 TYPE 2 DIABETES MELLITUS WITH MICROALBUMINURIA, WITH LONG-TERM CURRENT USE OF INSULIN: ICD-10-CM

## 2024-09-19 DIAGNOSIS — E11.29 TYPE 2 DIABETES MELLITUS WITH MICROALBUMINURIA, WITH LONG-TERM CURRENT USE OF INSULIN: ICD-10-CM

## 2024-09-19 DIAGNOSIS — R80.9 TYPE 2 DIABETES MELLITUS WITH MICROALBUMINURIA, WITH LONG-TERM CURRENT USE OF INSULIN: ICD-10-CM

## 2024-09-19 DIAGNOSIS — E10.65 UNCONTROLLED TYPE 1 DIABETES MELLITUS WITH HYPERGLYCEMIA: Primary | ICD-10-CM

## 2024-09-19 LAB — GLUCOSE SERPL-MCNC: >500 MG/DL (ref 70–110)

## 2024-09-19 PROCEDURE — 99999 PR PBB SHADOW E&M-EST. PATIENT-LVL III: CPT | Mod: PBBFAC,,, | Performed by: NURSE PRACTITIONER

## 2024-09-19 PROCEDURE — 82962 GLUCOSE BLOOD TEST: CPT | Mod: S$GLB,,, | Performed by: NURSE PRACTITIONER

## 2024-09-19 RX ORDER — TIRZEPATIDE 5 MG/.5ML
5 INJECTION, SOLUTION SUBCUTANEOUS
Qty: 2 ML | Refills: 11 | Status: SHIPPED | OUTPATIENT
Start: 2024-09-19 | End: 2025-09-19

## 2024-09-19 RX ORDER — INSULIN GLARGINE 100 [IU]/ML
42 INJECTION, SOLUTION SUBCUTANEOUS DAILY
Qty: 37.8 ML | Refills: 3 | Status: SHIPPED | OUTPATIENT
Start: 2024-09-19 | End: 2025-09-19

## 2024-09-19 NOTE — PROGRESS NOTES
"Yarely Thompson is a 54 y.o. female who presents for a follow up evaluation of Type 2 diabetes mellitus.     CHIEF COMPLAINT: Diabetes Consultation    PCP: Mikey Ayon MD      Initial visit with me - 6/26/23    The patient was initially diagnosed with diabetes 8 years ago.      Previous failed treatments include:  Metformin - GI upset.     Social Documentation:  Patient lives in [place] with .   Occupation: Resident  at [employer].   Exercise: no formal exercise program.   "I do not watch what I eat"      Diabetes related complications:   nephropathy.   denies Pancreatitis  denies Gastroparesis  denies DKA  denies Hx/family Hx of MEN2/MTC  reports Frequent UTIs/yeast infections     Cardiovascular Risk Factors: family history of premature cardiovascular disease, hypertension, microalbuminuria, obesity (BMI >30 kg/m2), and sedentary lifestyle.     Diabetes Medications               insulin (LANTUS SOLOSTAR U-100 INSULIN) glargine 100 units/mL SubQ pen Inject 36 Units into the skin once daily.    insulin aspart U-100 (NOVOLOG FLEXPEN U-100 INSULIN) 100 unit/mL (3 mL) InPn pen Inject 20 Units into the skin 3 (three) times daily before meals. +scale.<200=+0u, 201-250=+2u,251-300=+4u, 301-350=+6u, >350=+8u. Max 84 units/day    tirzepatide (MOUNJARO) 5 mg/0.5 mL PnIj Inject 5 mg into the skin every 7 days.     Current monitoring regimen: Chris 3 CGM    The patient's Chris CGM was downloaded and reviewed. For 14 days, patient average glucose was 375 mg/dL. She was above range 98% of the time, in range 2% of the time, and below range 0% of the time. The target range for this patient was 70 - 180 mg/dL. Overall, there was a pattern of severe hyperglycemia.      Recent hypoglycemic episodes: yes  Patient compliant with glucose checks and medication administration? Yes    DIABETES MANAGEMENT STATUS  Statin: Taking  ACE/ARB: Taking  Screening or Prevention Patient's value Goal " "Complete/Controlled?   HgA1C Testing and Control   Lab Results   Component Value Date    HGBA1C 9.9 (H) 08/19/2024      Annually/Less than 8% No   Lipid profile : 08/19/2024 Annually Yes   LDL control Lab Results   Component Value Date    LDLCALC 40.6 (L) 08/19/2024    Annually/Less than 100 mg/dl  Yes   Nephropathy screening Lab Results   Component Value Date    LABMICR 897.0 08/19/2024     No results found for: "PROTEINUA"  Lab Results   Component Value Date    UTPCR 1.07 (H) 08/19/2024      Annually Yes   Blood pressure BP Readings from Last 1 Encounters:   09/19/24 110/60    Less than 140/90 Yes   Dilated retinal exam : 06/20/2024 Annually Yes   Foot exam   : 08/19/2024 Annually No   Patient's medications, allergies, surgical, social and family histories were reviewed and updated as appropriate.     Review of Systems   Constitutional:  Negative for weight loss.   Eyes:  Negative for blurred vision and double vision.   Cardiovascular:  Negative for chest pain.   Gastrointestinal:  Negative for nausea and vomiting.   Genitourinary:  Negative for frequency.   Musculoskeletal:  Negative for falls.   Neurological:  Negative for dizziness and weakness.   Endo/Heme/Allergies:  Negative for polydipsia.   Psychiatric/Behavioral:  Negative for depression.    All other systems reviewed and are negative.       Physical Exam  Constitutional:       Appearance: Normal appearance.   HENT:      Head: Normocephalic and atraumatic.   Pulmonary:      Effort: No respiratory distress.   Musculoskeletal:      Cervical back: Normal range of motion.   Neurological:      Mental Status: She is alert and oriented to person, place, and time.   Psychiatric:         Mood and Affect: Mood normal.         Behavior: Behavior normal.        Blood pressure 110/60, pulse 70, height 5' 5" (1.651 m), weight 80.1 kg (176 lb 9.6 oz).  Wt Readings from Last 3 Encounters:   09/19/24 80.1 kg (176 lb 9.6 oz)   08/19/24 80 kg (176 lb 6.4 oz)   11/02/23 78.4 " kg (172 lb 13.5 oz)       LAB REVIEW  Lab Results   Component Value Date     (L) 08/19/2024     (L) 08/19/2024     (L) 08/19/2024    K 4.3 08/19/2024    K 4.3 08/19/2024    K 4.3 08/19/2024     08/19/2024     08/19/2024     08/19/2024    CO2 23 08/19/2024    CO2 23 08/19/2024    CO2 23 08/19/2024    BUN 12 08/19/2024    BUN 12 08/19/2024    BUN 12 08/19/2024    CREATININE 0.8 08/19/2024    CREATININE 0.8 08/19/2024    CREATININE 0.8 08/19/2024    CALCIUM 10.8 (H) 08/19/2024    CALCIUM 10.8 (H) 08/19/2024    CALCIUM 10.8 (H) 08/19/2024    ANIONGAP 8 08/19/2024    ANIONGAP 8 08/19/2024    ANIONGAP 8 08/19/2024    EGFRNORACEVR >60.0 08/19/2024    EGFRNORACEVR >60.0 08/19/2024    EGFRNORACEVR >60.0 08/19/2024     Lab Results   Component Value Date    CPEPTIDE 4.45 12/22/2022     Hemoglobin A1C   Date Value Ref Range Status   08/19/2024 9.9 (H) 4.0 - 5.6 % Final     Comment:     ADA Screening Guidelines:  5.7-6.4%  Consistent with prediabetes  >or=6.5%  Consistent with diabetes    High levels of fetal hemoglobin interfere with the HbA1C  assay. Heterozygous hemoglobin variants (HbS, HgC, etc)do  not significantly interfere with this assay.   However, presence of multiple variants may affect accuracy.     09/14/2023 9.0 (H) 4.0 - 5.6 % Final     Comment:     ADA Screening Guidelines:  5.7-6.4%  Consistent with prediabetes  >or=6.5%  Consistent with diabetes    High levels of fetal hemoglobin interfere with the HbA1C  assay. Heterozygous hemoglobin variants (HbS, HgC, etc)do  not significantly interfere with this assay.   However, presence of multiple variants may affect accuracy.     06/09/2023 11.7 (H) 4.0 - 5.6 % Final     Comment:     ADA Screening Guidelines:  5.7-6.4%  Consistent with prediabetes  >or=6.5%  Consistent with diabetes    High levels of fetal hemoglobin interfere with the HbA1C  assay. Heterozygous hemoglobin variants (HbS, HgC, etc)do  not significantly interfere with  this assay.   However, presence of multiple variants may affect accuracy.          ASSESSMENT    ICD-10-CM ICD-9-CM   1. Uncontrolled type 1 diabetes mellitus with hyperglycemia  E10.65 250.83     790.29   2. Type 2 diabetes mellitus with microalbuminuria, with long-term current use of insulin  E11.29 250.40    R80.9 791.0    Z79.4 V58.67           DOMINICK Pritchett was seen today for diabetes mellitus.    Diagnoses and all orders for this visit:    Uncontrolled type 1 diabetes mellitus with hyperglycemia  -     POCT Glucose, Hand-Held Device    Type 2 diabetes mellitus with microalbuminuria, with long-term current use of insulin  -     insulin glargine U-100, Lantus, (LANTUS SOLOSTAR U-100 INSULIN) 100 unit/mL (3 mL) InPn pen; Inject 42 Units into the skin once daily.  -     tirzepatide (MOUNJARO) 5 mg/0.5 mL PnIj; Inject 5 mg into the skin every 7 days.            Reviewed pathophysiology of diabetes, complications related to the disease, importance of annual dilated eye exam and daily foot examination. Explained MOA, SE, dosage of medications. Written instructions given and reviewed with patient and patient verbalizes understanding.     11/1/2023 - continues to drink 1-2 iced coffees from [employer] daily. Will see dietician today to discuss diet. Will increase mounjaro to 10 mg, lantus to 42 units daily. F/u 6 wks with labs.    9/19/2024 - poc glucose >500 in office today. Patient drank iced coffee prior to arrival. Patient is advised to seek emergency treatment today for her severe hyperglycemia. She declines. Denies polyuria/polydipsia. Overall hyperglycemia on Chris. Discussed in depth need for lifestyle changes. Stressed importance of no sugary drinks, including iced coffees.     PATIENT INSTRUCTIONS     Lifestyle modification with well balanced diet and at least 30 minutes of physical activity daily recommended.   Increase water intake.   Increase protein and non-starchy vegetable servings with meals.    Decrease carbohydrate servings with meals.   Take 10 minute walk after each meal.   Avoid snacking on carbohydrates, choose low carb, high protein snacks.   Incorporate resistance training with weights or resistance bands with exercise regimen at least 3 days a week.       Restart Mounjaro at 5 mg subcutaneously every 7 days.   Increase Lantus to 42 units subcutaneously daily.   Continue Novolog three times daily with meals using correction dosing.  Take 10-15 minutes before meal.   Bring insulin to work, take before lunch.     If  - 250, may take 16 units of Novolog  If  - 300, may take 18 units of Novolog  If  - 350, may take 20 units of Novolog  If  - 400, may take 22 units of Novolog  If +, may take 24 units of Novolog     Continue Chris 3 CGM  Blood Sugar Goals:       Fastin-130.       1-2 hours after a meal: Less than 180.     Follow up in about 4 weeks (around 10/17/2024) for Chris.

## 2024-09-19 NOTE — PATIENT INSTRUCTIONS
PATIENT INSTRUCTIONS     Lifestyle modification with well balanced diet and at least 30 minutes of physical activity daily recommended.   Increase water intake.   Increase protein and non-starchy vegetable servings with meals.   Decrease carbohydrate servings with meals.   Take 10 minute walk after each meal.   Avoid snacking on carbohydrates, choose low carb, high protein snacks.   Incorporate resistance training with weights or resistance bands with exercise regimen at least 3 days a week.       Restart Mounjaro at 5 mg subcutaneously every 7 days.   Increase Lantus to 42 units subcutaneously daily.   Continue Novolog three times daily with meals using correction dosing.  Take 10-15 minutes before meal.   Bring insulin to work, take before lunch.     If  - 250, may take 16 units of Novolog  If  - 300, may take 18 units of Novolog  If  - 350, may take 20 units of Novolog  If  - 400, may take 22 units of Novolog  If +, may take 24 units of Novolog     Continue Chris 3 CGM  Blood Sugar Goals:       Fastin-130.       1-2 hours after a meal: Less than 180.

## 2024-09-20 ENCOUNTER — TELEPHONE (OUTPATIENT)
Dept: FAMILY MEDICINE | Facility: CLINIC | Age: 54
End: 2024-09-20
Payer: COMMERCIAL

## 2024-09-20 ENCOUNTER — OFFICE VISIT (OUTPATIENT)
Dept: FAMILY MEDICINE | Facility: CLINIC | Age: 54
End: 2024-09-20
Payer: COMMERCIAL

## 2024-09-20 DIAGNOSIS — H00.014 HORDEOLUM EXTERNUM OF LEFT UPPER EYELID: Primary | ICD-10-CM

## 2024-09-20 RX ORDER — ERYTHROMYCIN 5 MG/G
OINTMENT OPHTHALMIC EVERY 6 HOURS
Qty: 3.5 G | Refills: 0 | Status: SHIPPED | OUTPATIENT
Start: 2024-09-20

## 2024-09-20 NOTE — PROGRESS NOTES
Primary Care Telemedicine Note    Established Patient - Audio Only Telehealth Visit     The patient location is:  [place]  The chief complaint leading to consultation is:  Per below note  Visit type: Virtual visit with audio only (telephone)   Total time spent with patient: 7 min       History of Present Illness    Ms. Thompson reports a history of a stye on her left lower eyelid, near the nasal crease. She applied stye medication for treatment, resulting in dryness of the affected area. The patient removed the resulting scab. Subsequently, her left upper eyelid became swollen and painful, with tenderness to touch affecting the entire lid. She denies any visual disturbances .  No URI symptoms.  No eye pain.    The patient denies fever and photophobia.        Diagnoses and all orders for this visit:    Hordeolum externum of left upper eyelid    Other orders  -     erythromycin (ROMYCIN) ophthalmic ointment; Place into the left eye every 6 (six) hours.     - Ms. Thompson to apply warm compresses to left eye 4 times daily.  - Started antibiotic eye ointment for left eye.  - Follow up optometry symptoms not resolving with above.           Past Medical History:  Past Medical History:   Diagnosis Date    Anxiety     Cholelithiasis     Depression     Fatty liver     HTN (hypertension)     Hyperlipidemia     Proteinuria     Dr. Sadler    Severe obesity (BMI 35.0-35.9 with comorbidity) 3/16/2018    ADEN (stress urinary incontinence, female)     Type 2 diabetes mellitus with hypertriglyceridemia 6/6/2022    Type 2 diabetes mellitus with microalbuminuria, with long-term current use of insulin 7/22/2016    Vitamin D insufficiency      No past surgical history on file.  Social History     Socioeconomic History    Marital status:    Tobacco Use    Smoking status: Every Day     Current packs/day: 0.00     Average packs/day: 1.5 packs/day for 28.0 years (42.0 ttl pk-yrs)     Types: Cigarettes     Start date: 8/6/1994     Last  attempt to quit: 2022     Years since quittin.1     Passive exposure: Current    Smokeless tobacco: Never   Substance and Sexual Activity    Alcohol use: No    Drug use: Never    Sexual activity: Not Currently     Partners: Male     Birth control/protection: Condom     Social Determinants of Health     Food Insecurity: No Food Insecurity (2024)    Hunger Vital Sign     Worried About Running Out of Food in the Last Year: Never true     Ran Out of Food in the Last Year: Never true   Physical Activity: Unknown (2024)    Exercise Vital Sign     Days of Exercise per Week: 5 days   Housing Stability: Unknown (2024)    Housing Stability Vital Sign     Unable to Pay for Housing in the Last Year: No     Family History   Problem Relation Name Age of Onset    Hypertension Mother Janice Amador     Diabetes Mother Janice Amador     Asthma Mother Janice Amador     Lymphoma Father Clemente Amador     Hypertension Father Clemente Amador     Diabetes Father Clemente Amador     Cancer Father Clemente Amador     Heart disease Brother  57    Breast cancer Maternal Aunt      Ovarian cancer Maternal Grandmother      Early death Brother Roman Amador     Kidney disease Neg Hx      Glaucoma Neg Hx      Macular degeneration Neg Hx      Retinal detachment Neg Hx       Review of patient's allergies indicates:   Allergen Reactions    Pcn [penicillins] Rash     Current Outpatient Medications on File Prior to Visit   Medication Sig Dispense Refill    ARIPiprazole (ABILIFY) 5 MG Tab Take 1 tablet (5 mg total) by mouth once daily. 30 tablet 0    blood-glucose meter kit Use as directed      blood-glucose sensor (FREESTYLE VIRGILIO 3 SENSOR) Dana 1 each by Misc.(Non-Drug; Combo Route) route once daily. 2 each 11    cholecalciferol, vitamin D3, 125 mcg (5,000 unit) Tab Take 5,000 Units by mouth once daily.      clonazePAM (KLONOPIN) 0.5 MG tablet Take 1 tablet (0.5 mg total) by mouth every evening. 30 tablet 0    insulin aspart U-100  "(NOVOLOG FLEXPEN U-100 INSULIN) 100 unit/mL (3 mL) InPn pen Inject 20 Units into the skin 3 (three) times daily before meals. +scale.<200=+0u, 201-250=+2u,251-300=+4u, 301-350=+6u, >350=+8u. Max 84 units/day 27 mL 5    insulin glargine U-100, Lantus, (LANTUS SOLOSTAR U-100 INSULIN) 100 unit/mL (3 mL) InPn pen Inject 42 Units into the skin once daily. 37.8 mL 3    lisinopriL (PRINIVIL,ZESTRIL) 20 MG tablet Take 1 tablet (20 mg total) by mouth once daily. 90 tablet 3    rosuvastatin (CRESTOR) 40 MG Tab Take 1 tablet (40 mg total) by mouth once daily. 90 tablet 0    sertraline (ZOLOFT) 100 MG tablet Take 2 tablets (200 mg total) by mouth once daily. 60 tablet 0    spironolactone (ALDACTONE) 25 MG tablet Take 1 tablet (25 mg total) by mouth once daily. 90 tablet 1    SURE COMFORT PEN NEEDLE 31 gauge x 3/16" Ndle USE TO INJECT INSULINS FOUR TIMES DAILY 100 each 5    tirzepatide (MOUNJARO) 5 mg/0.5 mL PnIj Inject 5 mg into the skin every 7 days. 2 mL 11     No current facility-administered medications on file prior to visit.         ROS:  GENERAL: No fever, chills.     There were no vitals filed for this visit.    Wt Readings from Last 3 Encounters:   09/19/24 80.1 kg (176 lb 9.6 oz)   08/19/24 80 kg (176 lb 6.4 oz)   11/02/23 78.4 kg (172 lb 13.5 oz)       The reason for the audio only service rather than synchronous audio and video virtual visit was related to technical difficulties or patient preference/necessity.     Each patient to whom I provide medical services by telemedicine is:  (1) informed of the relationship between the physician and patient and the respective role of any other health care provider with respect to management of the patient; and (2) notified that they may decline to receive medical services by telemedicine and may withdraw from such care at any time. Patient verbally consented to receive this service via voice-only telephone call.    This service was not originating from a related E/M service " provided within the previous 7 days nor will  to an E/M service or procedure within the next 24 hours or my soonest available appointment.  Prevailing standard of care was able to be met in this audio-only visit.

## 2024-09-20 NOTE — TELEPHONE ENCOUNTER
Patient declined office visit, requests audio as has problems with virtual, Appointment scheduled for 1500

## 2024-09-20 NOTE — TELEPHONE ENCOUNTER
----- Message from Marisol Wing sent at 9/20/2024 12:47 PM CDT -----  Type:  Patient Returning Call    Who Called:Yarely  Who Left Message for Patient:  Does the patient know what this is regarding?:Stye on eye is swollen  Would the patient rather a call back or a response via [email or web address]? Callback  Best Call Back Number:[phone]  Additional Information: Patient scratched eye and it is now swollen and painful. Can I get a ointment called in to   [facility] - [place] - [address] [facility]  [address] [address] [facility] [address]  [place] [address]  Phone: [phone] Fax: [phone]

## 2024-09-26 ENCOUNTER — PATIENT OUTREACH (OUTPATIENT)
Dept: ADMINISTRATIVE | Facility: HOSPITAL | Age: 54
End: 2024-09-26
Payer: COMMERCIAL

## 2024-10-03 ENCOUNTER — OFFICE VISIT (OUTPATIENT)
Dept: NEPHROLOGY | Facility: CLINIC | Age: 54
End: 2024-10-03
Payer: COMMERCIAL

## 2024-10-03 VITALS
HEIGHT: 65 IN | SYSTOLIC BLOOD PRESSURE: 114 MMHG | DIASTOLIC BLOOD PRESSURE: 70 MMHG | WEIGHT: 174.31 LBS | BODY MASS INDEX: 29.04 KG/M2 | OXYGEN SATURATION: 99 % | HEART RATE: 77 BPM

## 2024-10-03 DIAGNOSIS — F17.200 SMOKING: ICD-10-CM

## 2024-10-03 DIAGNOSIS — E11.29 TYPE 2 DIABETES MELLITUS WITH MICROALBUMINURIA, WITH LONG-TERM CURRENT USE OF INSULIN: ICD-10-CM

## 2024-10-03 DIAGNOSIS — R80.9 TYPE 2 DIABETES MELLITUS WITH MICROALBUMINURIA, WITH LONG-TERM CURRENT USE OF INSULIN: ICD-10-CM

## 2024-10-03 DIAGNOSIS — Z79.4 TYPE 2 DIABETES MELLITUS WITH MICROALBUMINURIA, WITH LONG-TERM CURRENT USE OF INSULIN: ICD-10-CM

## 2024-10-03 DIAGNOSIS — E78.5 HYPERLIPIDEMIA ASSOCIATED WITH TYPE 2 DIABETES MELLITUS: ICD-10-CM

## 2024-10-03 DIAGNOSIS — R80.1 PERSISTENT PROTEINURIA: Primary | ICD-10-CM

## 2024-10-03 DIAGNOSIS — E55.9 VITAMIN D INSUFFICIENCY: ICD-10-CM

## 2024-10-03 DIAGNOSIS — E11.69 HYPERLIPIDEMIA ASSOCIATED WITH TYPE 2 DIABETES MELLITUS: ICD-10-CM

## 2024-10-03 DIAGNOSIS — K76.0 FATTY LIVER: ICD-10-CM

## 2024-10-03 DIAGNOSIS — E11.21 TYPE 2 DIABETES MELLITUS WITH DIABETIC NEPHROPATHY, WITHOUT LONG-TERM CURRENT USE OF INSULIN: ICD-10-CM

## 2024-10-03 PROCEDURE — 3074F SYST BP LT 130 MM HG: CPT | Mod: CPTII,S$GLB,, | Performed by: INTERNAL MEDICINE

## 2024-10-03 PROCEDURE — 1159F MED LIST DOCD IN RCRD: CPT | Mod: CPTII,S$GLB,, | Performed by: INTERNAL MEDICINE

## 2024-10-03 PROCEDURE — 3046F HEMOGLOBIN A1C LEVEL >9.0%: CPT | Mod: CPTII,S$GLB,, | Performed by: INTERNAL MEDICINE

## 2024-10-03 PROCEDURE — 3008F BODY MASS INDEX DOCD: CPT | Mod: CPTII,S$GLB,, | Performed by: INTERNAL MEDICINE

## 2024-10-03 PROCEDURE — 99215 OFFICE O/P EST HI 40 MIN: CPT | Mod: S$GLB,,, | Performed by: INTERNAL MEDICINE

## 2024-10-03 PROCEDURE — 4010F ACE/ARB THERAPY RXD/TAKEN: CPT | Mod: CPTII,S$GLB,, | Performed by: INTERNAL MEDICINE

## 2024-10-03 PROCEDURE — 99999 PR PBB SHADOW E&M-EST. PATIENT-LVL V: CPT | Mod: PBBFAC,,, | Performed by: INTERNAL MEDICINE

## 2024-10-03 PROCEDURE — 3066F NEPHROPATHY DOC TX: CPT | Mod: CPTII,S$GLB,, | Performed by: INTERNAL MEDICINE

## 2024-10-03 PROCEDURE — 3062F POS MACROALBUMINURIA REV: CPT | Mod: CPTII,S$GLB,, | Performed by: INTERNAL MEDICINE

## 2024-10-03 PROCEDURE — 3078F DIAST BP <80 MM HG: CPT | Mod: CPTII,S$GLB,, | Performed by: INTERNAL MEDICINE

## 2024-10-03 PROCEDURE — 1160F RVW MEDS BY RX/DR IN RCRD: CPT | Mod: CPTII,S$GLB,, | Performed by: INTERNAL MEDICINE

## 2024-10-03 RX ORDER — PROPRANOLOL HYDROCHLORIDE 10 MG/1
10 TABLET ORAL 2 TIMES DAILY
COMMUNITY
Start: 2024-08-20

## 2024-10-03 RX ORDER — INSULIN ASPART 100 [IU]/ML
INJECTION, SOLUTION INTRAVENOUS; SUBCUTANEOUS
Qty: 15 ML | Refills: 3 | Status: SHIPPED | OUTPATIENT
Start: 2024-10-03

## 2024-10-03 NOTE — PROGRESS NOTES
Subjective:       Patient ID: Yarely Thompson is a 54 y.o. White female who presents for new evaluation of Proteinuria    HPI     2019-  She is referred by her PCP for proteinuria in the setting of DM   She denies foamy urine, no hematuria and no flank pain. She follows a low sodium diet and feels she stays hydrated. No LE edema and no SOB. No NSAID use and no herbal medications. NO known family history of kidney disease    Oct 2024:  Last seen in 2019 for proteinuria, Aldactone was added at that time and continues to take it   No tums, no calcium suplements  Non high dairy foods/drinks  She pushes fluids   DM2 runs in family   No kidney disease in family       Review of Systems   Constitutional:  Negative for activity change and appetite change.   Cardiovascular:  Negative for leg swelling.   Allergic/Immunologic: Positive for immunocompromised state.       Objective:      Physical Exam  Vitals and nursing note reviewed.   Constitutional:       General: She is not in acute distress.  Eyes:      General: No scleral icterus.  Neck:      Vascular: No JVD.   Cardiovascular:      Heart sounds: S1 normal and S2 normal.      No friction rub.   Pulmonary:      Breath sounds: Normal breath sounds. No wheezing or rales.   Abdominal:      Palpations: Abdomen is soft.   Skin:     General: Skin is warm and dry.   Neurological:      Mental Status: She is alert and oriented to person, place, and time.       Assessment:       1. Persistent proteinuria    2. Fatty liver    3. Smoking    4. Type 2 diabetes mellitus with diabetic nephropathy, without long-term current use of insulin    5. Hyperlipidemia associated with type 2 diabetes mellitus    6. Vitamin D insufficiency          Plan:           Proteinuria  - DM nephropathy. Proteinuria has improved with addition of Aldactone to ace (lisinopril), and she is also on GLP1a, and with weight loss. All of these measures are treatment for proteinuria. Possible initiation of SGLT2i in  future.  Fortunately her GFR remains >60ml/min at this point.  - Patient was advised to avoid NSAIDs, continue a low sodium diet, and ensure hydration  - discussed good BP control and glycemic control     Hypercalcemia  - no offending medications/supplements noted except vitamin D  - defer to PCP    HTN  - controlled, continue current medications but with ongoing weight loss advised her to monitor BPs as they may decrease    Fatty liver/splenomegaly/thrombocytopenia  - discussed with her re: low plts  - seeing Hepatology     Must quit smoking      RTC 4 mo  60 minutes of total time spent on the encounter, which includes face to face time and non-face to face time preparing to see the patient (eg, review of tests), Obtaining and/or reviewing separately obtained history, Documenting clinical information in the electronic or other health record, Independently interpreting results (not separately reported) and communicating results to the patient/family/caregiver, or Care coordination (not separately reported).

## 2024-10-03 NOTE — TELEPHONE ENCOUNTER
Refill Routing Note   Medication(s) are not appropriate for processing by [facility] for the following reason(s):        Outside of protocol    [facility] action(s):  Route        Medication Therapy Plan: Sliding scale insulin outside of [facility] protocol      Appointments  past 12m or future 3m with PCP    Date Provider   Last Visit   9/20/2024 Mikey Ayon MD   Next Visit   Visit date not found Mikey Ayon MD   ED visits in past 90 days: 0        Note composed:11:40 AM 10/03/2024

## 2024-10-03 NOTE — TELEPHONE ENCOUNTER
No care due was identified.  Health [facility] Embedded Care Due Messages. Reference number: [id number].   10/03/2024 11:09:21 AM CDT

## 2024-10-06 PROBLEM — E11.21 TYPE 2 DIABETES MELLITUS WITH DIABETIC NEPHROPATHY, WITHOUT LONG-TERM CURRENT USE OF INSULIN: Status: ACTIVE | Noted: 2024-10-06

## 2024-10-07 DIAGNOSIS — R77.9 ELEVATED SERUM PROTEIN LEVEL: ICD-10-CM

## 2024-10-07 DIAGNOSIS — D69.6 THROMBOCYTOPENIA: Primary | ICD-10-CM

## 2024-10-07 DIAGNOSIS — E83.52 SERUM CALCIUM ELEVATED: Primary | ICD-10-CM

## 2024-10-08 ENCOUNTER — LAB VISIT (OUTPATIENT)
Dept: LAB | Facility: HOSPITAL | Age: 54
End: 2024-10-08
Attending: NURSE PRACTITIONER
Payer: COMMERCIAL

## 2024-10-08 DIAGNOSIS — D69.6 THROMBOCYTOPENIA: ICD-10-CM

## 2024-10-08 DIAGNOSIS — E83.52 SERUM CALCIUM ELEVATED: ICD-10-CM

## 2024-10-08 DIAGNOSIS — R77.9 ELEVATED SERUM PROTEIN LEVEL: ICD-10-CM

## 2024-10-08 LAB
ALBUMIN SERPL BCP-MCNC: 4.3 G/DL (ref 3.5–5.2)
ALP SERPL-CCNC: 105 U/L (ref 55–135)
ALT SERPL W/O P-5'-P-CCNC: 33 U/L (ref 10–44)
ANION GAP SERPL CALC-SCNC: 11 MMOL/L (ref 8–16)
AST SERPL-CCNC: 39 U/L (ref 10–40)
BASOPHILS # BLD AUTO: 0.05 K/UL (ref 0–0.2)
BASOPHILS NFR BLD: 0.7 % (ref 0–1.9)
BILIRUB SERPL-MCNC: 0.4 MG/DL (ref 0.1–1)
BUN SERPL-MCNC: 8 MG/DL (ref 6–20)
CA-I BLDV-SCNC: 1.25 MMOL/L (ref 1.06–1.42)
CALCIUM SERPL-MCNC: 10.5 MG/DL (ref 8.7–10.5)
CHLORIDE SERPL-SCNC: 103 MMOL/L (ref 95–110)
CO2 SERPL-SCNC: 25 MMOL/L (ref 23–29)
CREAT SERPL-MCNC: 0.9 MG/DL (ref 0.5–1.4)
DIFFERENTIAL METHOD BLD: ABNORMAL
EOSINOPHIL # BLD AUTO: 0.1 K/UL (ref 0–0.5)
EOSINOPHIL NFR BLD: 1 % (ref 0–8)
ERYTHROCYTE [DISTWIDTH] IN BLOOD BY AUTOMATED COUNT: 13.6 % (ref 11.5–14.5)
EST. GFR  (NO RACE VARIABLE): >60 ML/MIN/1.73 M^2
FOLATE SERPL-MCNC: 5.7 NG/ML (ref 4–24)
GLUCOSE SERPL-MCNC: 217 MG/DL (ref 70–110)
HAV IGM SERPL QL IA: NORMAL
HBV CORE IGM SERPL QL IA: NORMAL
HBV SURFACE AG SERPL QL IA: NORMAL
HCT VFR BLD AUTO: 44 % (ref 37–48.5)
HCV AB SERPL QL IA: NORMAL
HGB BLD-MCNC: 15 G/DL (ref 12–16)
HIV 1+2 AB+HIV1 P24 AG SERPL QL IA: NORMAL
IGA SERPL-MCNC: 236 MG/DL (ref 40–350)
IGG SERPL-MCNC: 1000 MG/DL (ref 650–1600)
IGM SERPL-MCNC: 156 MG/DL (ref 50–300)
IMM GRANULOCYTES # BLD AUTO: 0.02 K/UL (ref 0–0.04)
IMM GRANULOCYTES NFR BLD AUTO: 0.3 % (ref 0–0.5)
LYMPHOCYTES # BLD AUTO: 1.8 K/UL (ref 1–4.8)
LYMPHOCYTES NFR BLD: 26.3 % (ref 18–48)
MCH RBC QN AUTO: 29.7 PG (ref 27–31)
MCHC RBC AUTO-ENTMCNC: 34.1 G/DL (ref 32–36)
MCV RBC AUTO: 87 FL (ref 82–98)
MONOCYTES # BLD AUTO: 0.3 K/UL (ref 0.3–1)
MONOCYTES NFR BLD: 5.1 % (ref 4–15)
NEUTROPHILS # BLD AUTO: 4.5 K/UL (ref 1.8–7.7)
NEUTROPHILS NFR BLD: 66.9 % (ref 38–73)
NRBC BLD-RTO: 0 /100 WBC
PATH REV BLD -IMP: NORMAL
PLATELET # BLD AUTO: 92 K/UL (ref 150–450)
PLATELET BLD QL SMEAR: ABNORMAL
PMV BLD AUTO: 11.5 FL (ref 9.2–12.9)
POTASSIUM SERPL-SCNC: 4.4 MMOL/L (ref 3.5–5.1)
PROT SERPL-MCNC: 8 G/DL (ref 6–8.4)
RBC # BLD AUTO: 5.05 M/UL (ref 4–5.4)
SODIUM SERPL-SCNC: 139 MMOL/L (ref 136–145)
VIT B12 SERPL-MCNC: 312 PG/ML (ref 210–950)
WBC # BLD AUTO: 6.7 K/UL (ref 3.9–12.7)

## 2024-10-08 PROCEDURE — 84165 PROTEIN E-PHORESIS SERUM: CPT | Mod: 26,,, | Performed by: PATHOLOGY

## 2024-10-08 PROCEDURE — 85025 COMPLETE CBC W/AUTO DIFF WBC: CPT | Mod: PO | Performed by: NURSE PRACTITIONER

## 2024-10-08 PROCEDURE — 87389 HIV-1 AG W/HIV-1&-2 AB AG IA: CPT | Performed by: NURSE PRACTITIONER

## 2024-10-08 PROCEDURE — 82746 ASSAY OF FOLIC ACID SERUM: CPT | Performed by: NURSE PRACTITIONER

## 2024-10-08 PROCEDURE — 80074 ACUTE HEPATITIS PANEL: CPT | Performed by: NURSE PRACTITIONER

## 2024-10-08 PROCEDURE — 84165 PROTEIN E-PHORESIS SERUM: CPT | Performed by: NURSE PRACTITIONER

## 2024-10-08 PROCEDURE — 82330 ASSAY OF CALCIUM: CPT | Performed by: NURSE PRACTITIONER

## 2024-10-08 PROCEDURE — 86038 ANTINUCLEAR ANTIBODIES: CPT | Performed by: NURSE PRACTITIONER

## 2024-10-08 PROCEDURE — 82607 VITAMIN B-12: CPT | Performed by: NURSE PRACTITIONER

## 2024-10-08 PROCEDURE — 83521 IG LIGHT CHAINS FREE EACH: CPT | Performed by: NURSE PRACTITIONER

## 2024-10-08 PROCEDURE — 36415 COLL VENOUS BLD VENIPUNCTURE: CPT | Mod: PO | Performed by: NURSE PRACTITIONER

## 2024-10-08 PROCEDURE — 86334 IMMUNOFIX E-PHORESIS SERUM: CPT | Performed by: NURSE PRACTITIONER

## 2024-10-08 PROCEDURE — 82784 ASSAY IGA/IGD/IGG/IGM EACH: CPT | Mod: 59 | Performed by: NURSE PRACTITIONER

## 2024-10-08 PROCEDURE — 85060 BLOOD SMEAR INTERPRETATION: CPT | Mod: ,,, | Performed by: PATHOLOGY

## 2024-10-08 PROCEDURE — 86334 IMMUNOFIX E-PHORESIS SERUM: CPT | Mod: 26,,, | Performed by: PATHOLOGY

## 2024-10-08 PROCEDURE — 80053 COMPREHEN METABOLIC PANEL: CPT | Performed by: NURSE PRACTITIONER

## 2024-10-09 ENCOUNTER — OFFICE VISIT (OUTPATIENT)
Dept: HEMATOLOGY/ONCOLOGY | Facility: CLINIC | Age: 54
End: 2024-10-09
Payer: COMMERCIAL

## 2024-10-09 VITALS
BODY MASS INDEX: 29.54 KG/M2 | HEART RATE: 66 BPM | SYSTOLIC BLOOD PRESSURE: 148 MMHG | DIASTOLIC BLOOD PRESSURE: 83 MMHG | HEIGHT: 65 IN | WEIGHT: 177.31 LBS

## 2024-10-09 DIAGNOSIS — K76.0 FATTY LIVER: ICD-10-CM

## 2024-10-09 DIAGNOSIS — R16.1 SPLENOMEGALY: ICD-10-CM

## 2024-10-09 DIAGNOSIS — D69.6 THROMBOCYTOPENIA: ICD-10-CM

## 2024-10-09 DIAGNOSIS — F17.200 CURRENT SMOKER: ICD-10-CM

## 2024-10-09 DIAGNOSIS — R16.2 HEPATOSPLENOMEGALY: ICD-10-CM

## 2024-10-09 DIAGNOSIS — K76.0 HEPATIC STEATOSIS: Primary | ICD-10-CM

## 2024-10-09 LAB
ALBUMIN SERPL ELPH-MCNC: 4.39 G/DL (ref 3.35–5.55)
ALPHA1 GLOB SERPL ELPH-MCNC: 0.31 G/DL (ref 0.17–0.41)
ALPHA2 GLOB SERPL ELPH-MCNC: 0.74 G/DL (ref 0.43–0.99)
ANA SER QL IF: NORMAL
B-GLOBULIN SERPL ELPH-MCNC: 0.87 G/DL (ref 0.5–1.1)
GAMMA GLOB SERPL ELPH-MCNC: 0.99 G/DL (ref 0.67–1.58)
INTERPRETATION SERPL IFE-IMP: NORMAL
KAPPA LC SER QL IA: 2.2 MG/DL (ref 0.33–1.94)
KAPPA LC/LAMBDA SER IA: 1.09 (ref 0.26–1.65)
LAMBDA LC SER QL IA: 2.02 MG/DL (ref 0.57–2.63)
PATH REV BLD -IMP: NORMAL
PROT SERPL-MCNC: 7.3 G/DL (ref 6–8.4)

## 2024-10-09 PROCEDURE — 3046F HEMOGLOBIN A1C LEVEL >9.0%: CPT | Mod: CPTII,S$GLB,, | Performed by: NURSE PRACTITIONER

## 2024-10-09 PROCEDURE — 99999 PR PBB SHADOW E&M-EST. PATIENT-LVL IV: CPT | Mod: PBBFAC,,, | Performed by: NURSE PRACTITIONER

## 2024-10-09 PROCEDURE — 1160F RVW MEDS BY RX/DR IN RCRD: CPT | Mod: CPTII,S$GLB,, | Performed by: NURSE PRACTITIONER

## 2024-10-09 PROCEDURE — 4010F ACE/ARB THERAPY RXD/TAKEN: CPT | Mod: CPTII,S$GLB,, | Performed by: NURSE PRACTITIONER

## 2024-10-09 PROCEDURE — 3066F NEPHROPATHY DOC TX: CPT | Mod: CPTII,S$GLB,, | Performed by: NURSE PRACTITIONER

## 2024-10-09 PROCEDURE — 3079F DIAST BP 80-89 MM HG: CPT | Mod: CPTII,S$GLB,, | Performed by: NURSE PRACTITIONER

## 2024-10-09 PROCEDURE — 1159F MED LIST DOCD IN RCRD: CPT | Mod: CPTII,S$GLB,, | Performed by: NURSE PRACTITIONER

## 2024-10-09 PROCEDURE — 3008F BODY MASS INDEX DOCD: CPT | Mod: CPTII,S$GLB,, | Performed by: NURSE PRACTITIONER

## 2024-10-09 PROCEDURE — G2211 COMPLEX E/M VISIT ADD ON: HCPCS | Mod: S$GLB,,, | Performed by: NURSE PRACTITIONER

## 2024-10-09 PROCEDURE — 99205 OFFICE O/P NEW HI 60 MIN: CPT | Mod: S$GLB,,, | Performed by: NURSE PRACTITIONER

## 2024-10-09 PROCEDURE — 3062F POS MACROALBUMINURIA REV: CPT | Mod: CPTII,S$GLB,, | Performed by: NURSE PRACTITIONER

## 2024-10-09 PROCEDURE — 3077F SYST BP >= 140 MM HG: CPT | Mod: CPTII,S$GLB,, | Performed by: NURSE PRACTITIONER

## 2024-10-09 NOTE — PROGRESS NOTES
Subjective:      Patient ID: Yarely Thompson is a 54 y.o. female.    Chief Complaint: no complaints    HPI:  55 yo female presents to the clinic as a new patient for further evaluation of thrombocytopenia.  Has been found with fatty liver and is scheduled to have a fibroscan soon and will see hepatology after to discuss results..  Thrombocytopenia noted in Epic starting in  with platelet count ranging from 92K - 146K.  Denies any known abnormal bleeding episodes or any abnormal bleeding in the past with previous surgeries.  Blue top shows platelets are low.  B12 and folate normal.  Noted elevated PTT at 33.9.  Noted past history of liver enzymes and elevated GGT.  States that she has lost 50 lbs over a year intentionally with Trulicity and now on MounJaro.  States that she takes ibuprofen 750 mg by mouth once a week about if she gets a headache.       cologuard - negative  2023 mammogram - negative for malignancy  10/2021 PAP negative for malignancy -  negative HPV    Family hx of cancer:  Father: lymphoma  Maternal 1/2 aunt: breast cancer    Denies alcohol use or tylenol.  Smokes cigarettes for the past 40 years averages about 1 pack/day.  Denies illicit drug use.    Denies f/c/ns or unintentional weight loss.      Denies any known abnormal lymphadenopathy.   I have reviewed all of the patient's relevant lab work available in the medical record and have utilized this in my evaluation and management recommendations today.      Social History     Socioeconomic History    Marital status:    Tobacco Use    Smoking status: Every Day     Current packs/day: 0.00     Average packs/day: 1.5 packs/day for 28.0 years (42.0 ttl pk-yrs)     Types: Cigarettes     Start date: 1994     Last attempt to quit: 2022     Years since quittin.1     Passive exposure: Current    Smokeless tobacco: Never   Substance and Sexual Activity    Alcohol use: No    Drug use: Never    Sexual activity: Not Currently      Partners: Male     Birth control/protection: Condom     Social Drivers of Health     Food Insecurity: No Food Insecurity (8/16/2024)    Hunger Vital Sign     Worried About Running Out of Food in the Last Year: Never true     Ran Out of Food in the Last Year: Never true   Physical Activity: Unknown (8/16/2024)    Exercise Vital Sign     Days of Exercise per Week: 5 days   Housing Stability: Unknown (8/16/2024)    Housing Stability Vital Sign     Unable to Pay for Housing in the Last Year: No       Family History   Problem Relation Name Age of Onset    Hypertension Mother Janice Amador     Diabetes Mother Janice Amador     Asthma Mother Janice Amador     Lymphoma Father Clemente Amdaor     Hypertension Father Clemente Amador     Diabetes Father Clemente Amador     Cancer Father Clemente Amador     Heart disease Brother  57    Breast cancer Maternal Aunt      Ovarian cancer Maternal Grandmother      Early death Brother Roman Amador     Kidney disease Neg Hx      Glaucoma Neg Hx      Macular degeneration Neg Hx      Retinal detachment Neg Hx         History reviewed. No pertinent surgical history.    Past Medical History:   Diagnosis Date    Anxiety     Cholelithiasis     Depression     Fatty liver     HTN (hypertension)     Hyperlipidemia     Proteinuria     Dr. Sadler    Severe obesity (BMI 35.0-35.9 with comorbidity) 3/16/2018    ADEN (stress urinary incontinence, female)     Type 2 diabetes mellitus with hypertriglyceridemia 6/6/2022    Type 2 diabetes mellitus with microalbuminuria, with long-term current use of insulin 7/22/2016    Vitamin D insufficiency        Review of Systems   Constitutional: Negative.  Negative for appetite change and unexpected weight change.   HENT: Negative.  Negative for mouth sores.    Eyes: Negative.  Negative for visual disturbance.   Respiratory: Negative.  Negative for cough and shortness of breath.    Cardiovascular: Negative.  Negative for chest pain.   Gastrointestinal: Negative.   "Negative for abdominal pain and diarrhea.   Endocrine: Negative.    Genitourinary: Negative.  Negative for frequency.   Musculoskeletal: Negative.  Negative for back pain.   Skin: Negative.  Negative for rash.   Allergic/Immunologic: Negative.    Neurological: Negative.  Negative for headaches.   Hematological: Negative.  Negative for adenopathy.   Psychiatric/Behavioral: Negative.  The patient is not nervous/anxious.           Medication List with Changes/Refills   Current Medications    ARIPIPRAZOLE (ABILIFY) 5 MG TAB    Take 1 tablet (5 mg total) by mouth once daily.    BLOOD-GLUCOSE METER KIT    Use as directed    BLOOD-GLUCOSE SENSOR (FREESTYLE VIRGILIO 3 SENSOR) ANGELA    1 each by Misc.(Non-Drug; Combo Route) route once daily.    CHOLECALCIFEROL, VITAMIN D3, 125 MCG (5,000 UNIT) TAB    Take 2,500 Units by mouth once daily.    CLONAZEPAM (KLONOPIN) 0.5 MG TABLET    Take 1 tablet (0.5 mg total) by mouth every evening.    INSULIN GLARGINE U-100, LANTUS, (LANTUS SOLOSTAR U-100 INSULIN) 100 UNIT/ML (3 ML) INPN PEN    Inject 42 Units into the skin once daily.    LISINOPRIL (PRINIVIL,ZESTRIL) 20 MG TABLET    Take 1 tablet (20 mg total) by mouth once daily.    NOVOLOG FLEXPEN U-100 INSULIN 100 UNIT/ML (3 ML) INPN PEN    INJECT 20 UNITS INTO THE SKIN 3 TIMES DAILY BEFORE MEALS (PLUS PER SLIDING SCALE. MAX 84 UNITS A DAY)    PROPRANOLOL (INDERAL) 10 MG TABLET    Take 10 mg by mouth 2 (two) times daily.    ROSUVASTATIN (CRESTOR) 40 MG TAB    Take 1 tablet (40 mg total) by mouth once daily.    SERTRALINE (ZOLOFT) 100 MG TABLET    Take 2 tablets (200 mg total) by mouth once daily.    SPIRONOLACTONE (ALDACTONE) 25 MG TABLET    Take 1 tablet (25 mg total) by mouth once daily.    SURE COMFORT PEN NEEDLE 31 GAUGE X 3/16" NDLE    USE TO INJECT INSULINS FOUR TIMES DAILY    TIRZEPATIDE (MOUNJARO) 5 MG/0.5 ML PNIJ    Inject 5 mg into the skin every 7 days.   Discontinued Medications    ERYTHROMYCIN (ROMYCIN) OPHTHALMIC OINTMENT    " "Place into the left eye every 6 (six) hours.        Objective:     Vitals:    10/09/24 0755   BP: (!) 148/83   Pulse: 66       Physical Exam    Assessment:     Problem List Items Addressed This Visit       Fatty liver    Relevant Orders    CBC Auto Differential    Comprehensive Metabolic Panel    Thrombocytopenia    Relevant Orders    CBC Auto Differential     Other Visit Diagnoses       Hepatic steatosis    -  Primary    Relevant Orders    CBC Auto Differential    Comprehensive Metabolic Panel    Hepatosplenomegaly        Relevant Orders    CBC Auto Differential    Comprehensive Metabolic Panel    Splenomegaly        Relevant Orders    CBC Auto Differential    Comprehensive Metabolic Panel    Current smoker                Lab Results   Component Value Date    WBC 6.70 10/08/2024    RBC 5.05 10/08/2024    HGB 15.0 10/08/2024    HCT 44.0 10/08/2024    MCV 87 10/08/2024    MCH 29.7 10/08/2024    MCHC 34.1 10/08/2024    RDW 13.6 10/08/2024    PLT 92 (L) 10/08/2024    MPV 11.5 10/08/2024    GRAN 4.5 10/08/2024    GRAN 66.9 10/08/2024    LYMPH 1.8 10/08/2024    LYMPH 26.3 10/08/2024    MONO 0.3 10/08/2024    MONO 5.1 10/08/2024    EOS 0.1 10/08/2024    BASO 0.05 10/08/2024    EOSINOPHIL 1.0 10/08/2024    BASOPHIL 0.7 10/08/2024      Lab Results   Component Value Date     10/08/2024    K 4.4 10/08/2024     10/08/2024    CO2 25 10/08/2024    BUN 8 10/08/2024    CREATININE 0.9 10/08/2024    CALCIUM 10.5 10/08/2024    ANIONGAP 11 10/08/2024    ESTGFRAFRICA >60.0 05/30/2022    ESTGFRAFRICA >60.0 05/30/2022    EGFRNONAA >60.0 05/30/2022    EGFRNONAA >60.0 05/30/2022     Lab Results   Component Value Date    ALT 33 10/08/2024    AST 39 10/08/2024     (H) 08/19/2024    ALKPHOS 105 10/08/2024    BILITOT 0.4 10/08/2024     No results found for: "IRON", "TRANSFERRIN", "TIBC", "FESATURATED", "FERRITIN"   Lab Results   Component Value Date    OMDOMBIA62 312 10/08/2024     Lab Results   Component Value Date    " FOLATE 5.7 10/08/2024     Lab Results   Component Value Date    APTT 33.9 (H) 08/19/2024     Lab Results   Component Value Date    KNO38OZPB Non-reactive 10/08/2024       Plan:   Hepatic steatosis  -     CBC Auto Differential; Future; Expected date: 10/09/2024  -     Comprehensive Metabolic Panel; Future; Expected date: 10/09/2024    Thrombocytopenia  -     Ambulatory referral/consult to Hematology / Oncology  -     CBC Auto Differential; Future; Expected date: 10/09/2024    Fatty liver  -     Ambulatory referral/consult to Hematology / Oncology  -     CBC Auto Differential; Future; Expected date: 10/09/2024  -     Comprehensive Metabolic Panel; Future; Expected date: 10/09/2024    Hepatosplenomegaly  -     Ambulatory referral/consult to Hematology / Oncology  -     CBC Auto Differential; Future; Expected date: 10/09/2024  -     Comprehensive Metabolic Panel; Future; Expected date: 10/09/2024    Splenomegaly  -     CBC Auto Differential; Future; Expected date: 10/09/2024  -     Comprehensive Metabolic Panel; Future; Expected date: 10/09/2024    Current smoker      Med Onc Chart Routing      Follow up with physician    Follow up with ALISHA 6 months. VV   Infusion scheduling note   n/a   Injection scheduling note n/a   Labs   Scheduling:  Preferred lab: [facility]  Lab interval:  cbc, cmp - prior in [place]   Imaging   N/a   Pharmacy appointment No pharmacy appointment needed      Other referrals       No additional referrals needed  n/a          Thrombocytopenia due to splenic sequestration due to fatty liver disease.  She will f/u with hepatology for staging and recommendations.  Rtc in 6 months with repeat labs    Total time spent on encounter: 60 minutes    Collaborating Provider:  Dr. Balta Padilla    Thank You,  LENA BarreraP-C  [facility]

## 2024-10-10 ENCOUNTER — TELEPHONE (OUTPATIENT)
Dept: HEPATOLOGY | Facility: CLINIC | Age: 54
End: 2024-10-10
Payer: COMMERCIAL

## 2024-10-10 LAB
PATHOLOGIST INTERPRETATION IFE: NORMAL
PATHOLOGIST INTERPRETATION SPE: NORMAL

## 2024-10-10 NOTE — TELEPHONE ENCOUNTER
Patient scheduled to see PA Scheuermann for a follow up visit on 12/2/24.  Provider not in clinic that day.  Attempt made to reach patient.  The above info left on her voicemail.  Appointment with provider moved to 12/3/24; reminder notice mailed.  I asked that she call hepatology back at [phone] if this appointment did not work for her.  This message was also sent to patient.

## 2024-10-14 DIAGNOSIS — E11.29 TYPE 2 DIABETES MELLITUS WITH MICROALBUMINURIA, WITH LONG-TERM CURRENT USE OF INSULIN: ICD-10-CM

## 2024-10-14 DIAGNOSIS — R80.9 TYPE 2 DIABETES MELLITUS WITH MICROALBUMINURIA, WITH LONG-TERM CURRENT USE OF INSULIN: ICD-10-CM

## 2024-10-14 DIAGNOSIS — Z79.4 TYPE 2 DIABETES MELLITUS WITH MICROALBUMINURIA, WITH LONG-TERM CURRENT USE OF INSULIN: ICD-10-CM

## 2024-10-14 RX ORDER — BLOOD-GLUCOSE SENSOR
1 EACH MISCELLANEOUS DAILY
Qty: 2 EACH | Refills: 11 | Status: SHIPPED | OUTPATIENT
Start: 2024-10-14 | End: 2025-10-14

## 2024-10-14 NOTE — TELEPHONE ENCOUNTER
----- Message from Tesha sent at 10/14/2024  9:13 AM CDT -----  Contact: PAULIE SPRAGUE [[id number]]  .Type:  RX Refill Request    Who Called: PAULIE SPRAGUE [[id number]]   Refill or New Rx: refill   RX Name and Strength:  1. tirzepatide (MOUNJARO) 5 mg/0.5 mL PnIj  2.  - blood-glucose sensor (FREESTYLE VIRGILIO 3 SENSOR) Dana  How is the patient currently taking it? (ex. 1XDay): as prescribed  Is this a 30 day or 90 day RX:   Preferred Pharmacy with phone number: .  [facility] - [place], [place] - [address] [facility]  [address] [address] [facility] [address]  Phone: [phone] Fax: [phone]  Local or Mail Order: local  Ordering Provider: Brigette   Would the patient rather a call back or a response via [facility]?  Call   Best Call Back Number: .[phone] (home)   Additional Information:  pt reports Mounjaro was supposed to be increased to 7.5ML

## 2024-10-23 ENCOUNTER — PROCEDURE VISIT (OUTPATIENT)
Facility: CLINIC | Age: 54
End: 2024-10-23
Payer: COMMERCIAL

## 2024-10-23 VITALS — WEIGHT: 173.19 LBS | BODY MASS INDEX: 28.82 KG/M2

## 2024-10-23 DIAGNOSIS — K76.0 FATTY LIVER: ICD-10-CM

## 2024-10-23 PROCEDURE — 91200 LIVER ELASTOGRAPHY: CPT | Mod: S$GLB,,, | Performed by: PHYSICIAN ASSISTANT

## 2024-10-24 ENCOUNTER — TELEPHONE (OUTPATIENT)
Dept: HEPATOLOGY | Facility: CLINIC | Age: 54
End: 2024-10-24
Payer: COMMERCIAL

## 2024-10-24 NOTE — TELEPHONE ENCOUNTER
Patient wants fibroscan results.  I spoke with patient and told her that PA Scheuermann was out of the office until Monday but that a msg would be sent to provider.

## 2024-10-25 ENCOUNTER — TELEPHONE (OUTPATIENT)
Dept: DIABETES | Facility: CLINIC | Age: 54
End: 2024-10-25
Payer: COMMERCIAL

## 2024-10-25 NOTE — TELEPHONE ENCOUNTER
Freestyle Chris 3 sensor PA submitted via cover my med. CaseId:[id number];Status:Approved;Review Type:Prior Auth;Coverage Start Date:10/25/2024;Coverage End Date:10/25/2025;   Pt informed of approval

## 2024-10-25 NOTE — TELEPHONE ENCOUNTER
----- Message from Monique sent at 10/25/2024  9:03 AM CDT -----  Contact: Yarely Pritchett is calling because her pharmacy is needing a PA for her blood-glucose sensor (FREESTYLE VIRGILIO 3 SENSOR) Dana. Please sent PA to   [facility] - [place] - [address] [address]  [address] [address] [place] [address]  Phone: [phone] Fax: [phone]    Thank you monique

## 2024-10-25 NOTE — TELEPHONE ENCOUNTER
Pls tell pt her FibroScan does show cirrhosis scarring in her liver.  We'll discuss this in detail at her next visit  Her liver is functioning very well, so keeping her appt in Dec is perfectly safe.  If I have a cancellation and she wants to be seen sooner, that is fine too.    thanks

## 2024-10-25 NOTE — TELEPHONE ENCOUNTER
----- Message from Ryan sent at 10/24/2024  4:43 PM CDT -----  Contact: kenroy Pritchett is calling to inform the office her pharmacy needs a PA in order to receive her blood-glucose sensor (FREESTYLE VIRGILIO 3 SENSOR) Dana   Please give her a call back at [phone]       [facility] - [place] - [address] [address] [facility]  [address] [address] [place] [address]  Phone: [phone] Fax: [phone]

## 2024-10-30 ENCOUNTER — TELEPHONE (OUTPATIENT)
Dept: HEPATOLOGY | Facility: CLINIC | Age: 54
End: 2024-10-30
Payer: COMMERCIAL

## 2024-10-31 ENCOUNTER — TELEPHONE (OUTPATIENT)
Dept: HEPATOLOGY | Facility: CLINIC | Age: 54
End: 2024-10-31
Payer: COMMERCIAL

## 2024-10-31 ENCOUNTER — OFFICE VISIT (OUTPATIENT)
Dept: HEPATOLOGY | Facility: CLINIC | Age: 54
End: 2024-10-31
Payer: COMMERCIAL

## 2024-10-31 DIAGNOSIS — K74.60 HEPATIC CIRRHOSIS, UNSPECIFIED HEPATIC CIRRHOSIS TYPE, UNSPECIFIED WHETHER ASCITES PRESENT: Primary | ICD-10-CM

## 2024-10-31 DIAGNOSIS — K76.6 PORTAL VENOUS HYPERTENSION: ICD-10-CM

## 2024-10-31 DIAGNOSIS — K76.0 METABOLIC DYSFUNCTION-ASSOCIATED STEATOTIC LIVER DISEASE (MASLD): ICD-10-CM

## 2024-10-31 DIAGNOSIS — E66.3 OVERWEIGHT WITH BODY MASS INDEX (BMI) OF 28 TO 28.9 IN ADULT: ICD-10-CM

## 2024-10-31 DIAGNOSIS — D69.6 THROMBOCYTOPENIA: ICD-10-CM

## 2024-10-31 PROCEDURE — 3046F HEMOGLOBIN A1C LEVEL >9.0%: CPT | Mod: CPTII,95,, | Performed by: PHYSICIAN ASSISTANT

## 2024-10-31 PROCEDURE — 99215 OFFICE O/P EST HI 40 MIN: CPT | Mod: 95,,, | Performed by: PHYSICIAN ASSISTANT

## 2024-10-31 PROCEDURE — 1160F RVW MEDS BY RX/DR IN RCRD: CPT | Mod: CPTII,95,, | Performed by: PHYSICIAN ASSISTANT

## 2024-10-31 PROCEDURE — 3062F POS MACROALBUMINURIA REV: CPT | Mod: CPTII,95,, | Performed by: PHYSICIAN ASSISTANT

## 2024-10-31 PROCEDURE — 4010F ACE/ARB THERAPY RXD/TAKEN: CPT | Mod: CPTII,95,, | Performed by: PHYSICIAN ASSISTANT

## 2024-10-31 PROCEDURE — 1159F MED LIST DOCD IN RCRD: CPT | Mod: CPTII,95,, | Performed by: PHYSICIAN ASSISTANT

## 2024-10-31 PROCEDURE — 3066F NEPHROPATHY DOC TX: CPT | Mod: CPTII,95,, | Performed by: PHYSICIAN ASSISTANT

## 2024-11-07 ENCOUNTER — LAB VISIT (OUTPATIENT)
Dept: LAB | Facility: HOSPITAL | Age: 54
End: 2024-11-07
Payer: COMMERCIAL

## 2024-11-07 DIAGNOSIS — K74.60 HEPATIC CIRRHOSIS, UNSPECIFIED HEPATIC CIRRHOSIS TYPE, UNSPECIFIED WHETHER ASCITES PRESENT: ICD-10-CM

## 2024-11-07 LAB
AFP SERPL-MCNC: 2.7 NG/ML (ref 0–8.4)
HAV IGG SER QL IA: NORMAL
HBV CORE AB SERPL QL IA: NORMAL
HBV SURFACE AB SER-ACNC: >1000 MIU/ML
HBV SURFACE AB SER-ACNC: REACTIVE M[IU]/ML

## 2024-11-07 PROCEDURE — 82105 ALPHA-FETOPROTEIN SERUM: CPT | Performed by: PHYSICIAN ASSISTANT

## 2024-11-07 PROCEDURE — 86790 VIRUS ANTIBODY NOS: CPT | Performed by: PHYSICIAN ASSISTANT

## 2024-11-07 PROCEDURE — 86706 HEP B SURFACE ANTIBODY: CPT | Performed by: PHYSICIAN ASSISTANT

## 2024-11-07 PROCEDURE — 86704 HEP B CORE ANTIBODY TOTAL: CPT | Performed by: PHYSICIAN ASSISTANT

## 2024-11-07 PROCEDURE — 36415 COLL VENOUS BLD VENIPUNCTURE: CPT | Mod: PO | Performed by: PHYSICIAN ASSISTANT

## 2024-11-12 RX ORDER — ROSUVASTATIN CALCIUM 40 MG/1
40 TABLET, COATED ORAL DAILY
Qty: 90 TABLET | Refills: 3 | Status: SHIPPED | OUTPATIENT
Start: 2024-11-12

## 2024-11-12 NOTE — TELEPHONE ENCOUNTER
No care due was identified.  [facility] Embedded Care Due Messages. Reference number: [id number].   11/12/2024 1:43:04 PM CST

## 2024-11-12 NOTE — TELEPHONE ENCOUNTER
Refill Decision Note   Yarely Thompson  is requesting a refill authorization.  Brief Assessment and Rationale for Refill:  Approve     Medication Therapy Plan:         Comments:     Note composed:2:09 PM 11/12/2024

## 2024-11-21 ENCOUNTER — OFFICE VISIT (OUTPATIENT)
Dept: DIABETES | Facility: CLINIC | Age: 54
End: 2024-11-21
Payer: COMMERCIAL

## 2024-11-21 ENCOUNTER — HOSPITAL ENCOUNTER (OUTPATIENT)
Dept: RADIOLOGY | Facility: HOSPITAL | Age: 54
Discharge: HOME | End: 2024-11-21
Attending: FAMILY MEDICINE
Payer: COMMERCIAL

## 2024-11-21 VITALS
WEIGHT: 172.31 LBS | DIASTOLIC BLOOD PRESSURE: 79 MMHG | SYSTOLIC BLOOD PRESSURE: 132 MMHG | BODY MASS INDEX: 28.67 KG/M2 | HEART RATE: 71 BPM

## 2024-11-21 DIAGNOSIS — E11.29 TYPE 2 DIABETES MELLITUS WITH MICROALBUMINURIA, WITH LONG-TERM CURRENT USE OF INSULIN: Primary | ICD-10-CM

## 2024-11-21 DIAGNOSIS — Z79.4 TYPE 2 DIABETES MELLITUS WITH MICROALBUMINURIA, WITH LONG-TERM CURRENT USE OF INSULIN: Primary | ICD-10-CM

## 2024-11-21 DIAGNOSIS — R80.9 TYPE 2 DIABETES MELLITUS WITH MICROALBUMINURIA, WITH LONG-TERM CURRENT USE OF INSULIN: Primary | ICD-10-CM

## 2024-11-21 DIAGNOSIS — Z12.31 ENCOUNTER FOR SCREENING MAMMOGRAM FOR MALIGNANT NEOPLASM OF BREAST: ICD-10-CM

## 2024-11-21 LAB — GLUCOSE SERPL-MCNC: 207 MG/DL (ref 70–110)

## 2024-11-21 PROCEDURE — 77067 SCR MAMMO BI INCL CAD: CPT | Mod: 26,,, | Performed by: RADIOLOGY

## 2024-11-21 PROCEDURE — 3052F HG A1C>EQUAL 8.0%<EQUAL 9.0%: CPT | Mod: CPTII,S$GLB,, | Performed by: NURSE PRACTITIONER

## 2024-11-21 PROCEDURE — G2211 COMPLEX E/M VISIT ADD ON: HCPCS | Mod: S$GLB,,, | Performed by: NURSE PRACTITIONER

## 2024-11-21 PROCEDURE — 77063 BREAST TOMOSYNTHESIS BI: CPT | Mod: 26,,, | Performed by: RADIOLOGY

## 2024-11-21 PROCEDURE — 3078F DIAST BP <80 MM HG: CPT | Mod: CPTII,S$GLB,, | Performed by: NURSE PRACTITIONER

## 2024-11-21 PROCEDURE — 99214 OFFICE O/P EST MOD 30 MIN: CPT | Mod: S$GLB,,, | Performed by: NURSE PRACTITIONER

## 2024-11-21 PROCEDURE — 82962 GLUCOSE BLOOD TEST: CPT | Mod: S$GLB,,, | Performed by: NURSE PRACTITIONER

## 2024-11-21 PROCEDURE — 1159F MED LIST DOCD IN RCRD: CPT | Mod: CPTII,S$GLB,, | Performed by: NURSE PRACTITIONER

## 2024-11-21 PROCEDURE — 77063 BREAST TOMOSYNTHESIS BI: CPT | Mod: TC,PO

## 2024-11-21 PROCEDURE — 99999 PR PBB SHADOW E&M-EST. PATIENT-LVL IV: CPT | Mod: PBBFAC,,, | Performed by: NURSE PRACTITIONER

## 2024-11-21 PROCEDURE — 3066F NEPHROPATHY DOC TX: CPT | Mod: CPTII,S$GLB,, | Performed by: NURSE PRACTITIONER

## 2024-11-21 PROCEDURE — 4010F ACE/ARB THERAPY RXD/TAKEN: CPT | Mod: CPTII,S$GLB,, | Performed by: NURSE PRACTITIONER

## 2024-11-21 PROCEDURE — 3075F SYST BP GE 130 - 139MM HG: CPT | Mod: CPTII,S$GLB,, | Performed by: NURSE PRACTITIONER

## 2024-11-21 PROCEDURE — 3062F POS MACROALBUMINURIA REV: CPT | Mod: CPTII,S$GLB,, | Performed by: NURSE PRACTITIONER

## 2024-11-21 PROCEDURE — 3008F BODY MASS INDEX DOCD: CPT | Mod: CPTII,S$GLB,, | Performed by: NURSE PRACTITIONER

## 2024-11-21 RX ORDER — INSULIN GLARGINE 100 [IU]/ML
46 INJECTION, SOLUTION SUBCUTANEOUS DAILY
Qty: 41.4 ML | Refills: 3 | Status: SHIPPED | OUTPATIENT
Start: 2024-11-21 | End: 2025-11-21

## 2024-11-21 RX ORDER — INSULIN ASPART 100 [IU]/ML
14-24 INJECTION, SOLUTION INTRAVENOUS; SUBCUTANEOUS
Qty: 21.6 ML | Refills: 11 | Status: SHIPPED | OUTPATIENT
Start: 2024-11-21 | End: 2025-11-21

## 2024-11-21 NOTE — PATIENT INSTRUCTIONS
PATIENT INSTRUCTIONS     Lifestyle modification with well balanced diet and at least 30 minutes of physical activity daily recommended.   Increase water intake.   Increase protein and non-starchy vegetable servings with meals.   Decrease carbohydrate servings with meals.   Take 10 minute walk after each meal.   Avoid snacking on carbohydrates, choose low carb, high protein snacks.   Incorporate resistance training with weights or resistance bands with exercise regimen at least 3 days a week.       Increase Mounjaro at to 10 mg subcutaneously every 7 days.   Increase Lantus to 46 units subcutaneously daily.   Continue Novolog three times daily with meals using correction dosing.  Take 10-15 minutes before meal.   Bring insulin to work, take before lunch.     If BG less than 200, may take 14 units of Novolog  If  - 250, may take 16 units of Novolog  If  - 300, may take 18 units of Novolog  If  - 350, may take 20 units of Novolog  If  - 400, may take 22 units of Novolog  If +, may take 24 units of Novolog     Continue Chris 3 CGM  Blood Sugar Goals:       Fastin-130.       1-2 hours after a meal: Less than 180.

## 2024-11-21 NOTE — PROGRESS NOTES
"Yarely Thompson is a 54 y.o. female who presents for a follow up evaluation of Type 2 diabetes mellitus.     CHIEF COMPLAINT: Diabetes Consultation    PCP: Mikey Ayon MD      Initial visit with me - 6/26/23    The patient was initially diagnosed with diabetes 8 years ago.      Previous failed treatments include:  Metformin - GI upset.     Social Documentation:  Patient lives in [place] with .   Occupation: Resident  at [employer].   Exercise: no formal exercise program.   "I do not watch what I eat"      Diabetes related complications:   nephropathy.   denies Pancreatitis  denies Gastroparesis  denies DKA  denies Hx/family Hx of MEN2/MTC  reports Frequent UTIs/yeast infections      Diabetes Medications               insulin glargine U-100, Lantus, (LANTUS SOLOSTAR U-100 INSULIN) 100 unit/mL (3 mL) InPn pen Inject 42 Units into the skin once daily.    NOVOLOG FLEXPEN U-100 INSULIN 100 unit/mL (3 mL) InPn pen INJECT 20 UNITS INTO THE SKIN 3 TIMES DAILY BEFORE MEALS (PLUS PER SLIDING SCALE. MAX 84 UNITS A DAY)    tirzepatide 7.5 mg/0.5 mL PnIj Inject 7.5 mg into the skin every 7 days.     Current monitoring regimen: Chris 3 CGM    The patient's Chris CGM was downloaded and reviewed. For 14 days, patient average glucose was 234 mg/dL. She was above range 77% of the time, in range 23% of the time, and below range 0% of the time. The target range for this patient was 70 - 180 mg/dL. Overall, there was a pattern of hyperglycemia, overall improvement since last visit..        Recent hypoglycemic episodes: yes  Patient compliant with glucose checks and medication administration? Yes    DIABETES MANAGEMENT STATUS  Statin: Taking  ACE/ARB: Taking  Screening or Prevention Patient's value Goal Complete/Controlled?   HgA1C Testing and Control   Lab Results   Component Value Date    HGBA1C 9.9 (H) 08/19/2024      Annually/Less than 8% No   Lipid profile : 08/19/2024 Annually Yes   LDL " "control Lab Results   Component Value Date    LDLCALC 40.6 (L) 08/19/2024    Annually/Less than 100 mg/dl  Yes   Nephropathy screening Lab Results   Component Value Date    LABMICR 897.0 08/19/2024     No results found for: "PROTEINUA"  Lab Results   Component Value Date    UTPCR 1.07 (H) 08/19/2024      Annually Yes   Blood pressure BP Readings from Last 1 Encounters:   11/21/24 132/79    Less than 140/90 Yes   Dilated retinal exam : 06/20/2024 Annually Yes   Foot exam   : 08/19/2024 Annually No   Patient's medications, allergies, surgical, social and family histories were reviewed and updated as appropriate.     Review of Systems   Constitutional:  Negative for weight loss.   Eyes:  Negative for blurred vision and double vision.   Cardiovascular:  Negative for chest pain.   Gastrointestinal:  Negative for nausea and vomiting.   Genitourinary:  Negative for frequency.   Musculoskeletal:  Negative for falls.   Neurological:  Negative for dizziness and weakness.   Endo/Heme/Allergies:  Negative for polydipsia.   Psychiatric/Behavioral:  Negative for depression.    All other systems reviewed and are negative.       Physical Exam  Constitutional:       Appearance: Normal appearance.   HENT:      Head: Normocephalic and atraumatic.   Pulmonary:      Effort: No respiratory distress.   Musculoskeletal:      Cervical back: Normal range of motion.   Neurological:      Mental Status: She is alert and oriented to person, place, and time.   Psychiatric:         Mood and Affect: Mood normal.         Behavior: Behavior normal.        Blood pressure 132/79, pulse 71, weight 78.2 kg (172 lb 4.8 oz).  Wt Readings from Last 3 Encounters:   11/21/24 78.2 kg (172 lb 4.8 oz)   10/23/24 78.5 kg (173 lb 2.7 oz)   10/09/24 80.4 kg (177 lb 4.8 oz)       LAB REVIEW  Lab Results   Component Value Date     10/08/2024    K 4.4 10/08/2024     10/08/2024    CO2 25 10/08/2024    BUN 8 10/08/2024    CREATININE 0.9 10/08/2024    CALCIUM " 10.5 10/08/2024    ANIONGAP 11 10/08/2024    EGFRNORACEVR >60 10/08/2024     Lab Results   Component Value Date    CPEPTIDE 4.45 12/22/2022     Hemoglobin A1C   Date Value Ref Range Status   08/19/2024 9.9 (H) 4.0 - 5.6 % Final     Comment:     ADA Screening Guidelines:  5.7-6.4%  Consistent with prediabetes  >or=6.5%  Consistent with diabetes    High levels of fetal hemoglobin interfere with the HbA1C  assay. Heterozygous hemoglobin variants (HbS, HgC, etc)do  not significantly interfere with this assay.   However, presence of multiple variants may affect accuracy.     09/14/2023 9.0 (H) 4.0 - 5.6 % Final     Comment:     ADA Screening Guidelines:  5.7-6.4%  Consistent with prediabetes  >or=6.5%  Consistent with diabetes    High levels of fetal hemoglobin interfere with the HbA1C  assay. Heterozygous hemoglobin variants (HbS, HgC, etc)do  not significantly interfere with this assay.   However, presence of multiple variants may affect accuracy.     06/09/2023 11.7 (H) 4.0 - 5.6 % Final     Comment:     ADA Screening Guidelines:  5.7-6.4%  Consistent with prediabetes  >or=6.5%  Consistent with diabetes    High levels of fetal hemoglobin interfere with the HbA1C  assay. Heterozygous hemoglobin variants (HbS, HgC, etc)do  not significantly interfere with this assay.   However, presence of multiple variants may affect accuracy.          ASSESSMENT    ICD-10-CM ICD-9-CM   1. Type 2 diabetes mellitus with microalbuminuria, with long-term current use of insulin  E11.29 250.40    R80.9 791.0    Z79.4 V58.67       PLAN  Diagnoses and all orders for this visit:    Type 2 diabetes mellitus with microalbuminuria, with long-term current use of insulin  -     POCT Glucose, Hand-Held Device  -     Hemoglobin A1C; Future  -     Basic Metabolic Panel; Future  -     tirzepatide 10 mg/0.5 mL PnIj; Inject 10 mg into the skin every 7 days.  -     insulin glargine U-100, Lantus, (LANTUS SOLOSTAR U-100 INSULIN) 100 unit/mL (3 mL) InPn  pen; Inject 46 Units into the skin once daily.  -     insulin aspart U-100 (NOVOLOG FLEXPEN U-100 INSULIN) 100 unit/mL (3 mL) InPn pen; Inject 14-24 Units into the skin 3 (three) times daily with meals. USING CORRECTION SCALE      Reviewed pathophysiology of diabetes, complications related to the disease, importance of annual dilated eye exam and daily foot examination. Explained MOA, SE, dosage of medications. Written instructions given and reviewed with patient and patient verbalizes understanding.     11/1/2023 - continues to drink 1-2 iced coffees from "[employer] daily. Will see dietician today to discuss diet. Will increase mounjaro to 10 mg, lantus to 42 units daily. F/u 6 wks with labs.    9/19/2024 - poc glucose >500 in office today. Patient drank iced coffee prior to arrival. Patient is advised to seek emergency treatment today for her severe hyperglycemia. She declines. Denies polyuria/polydipsia. Overall hyperglycemia on Chris. Discussed in depth need for lifestyle changes. Stressed importance of no sugary drinks, including iced coffees.     11/21/2024 -     PATIENT INSTRUCTIONS     Lifestyle modification with well balanced diet and at least 30 minutes of physical activity daily recommended.   Increase water intake.   Increase protein and non-starchy vegetable servings with meals.   Decrease carbohydrate servings with meals.   Take 10 minute walk after each meal.   Avoid snacking on carbohydrates, choose low carb, high protein snacks.   Incorporate resistance training with weights or resistance bands with exercise regimen at least 3 days a week.       Increase Mounjaro at to 10 mg subcutaneously every 7 days.   Increase Lantus to 46 units subcutaneously daily.   Continue Novolog three times daily with meals using correction dosing.  Take 10-15 minutes before meal.   Bring insulin to work, take before lunch.     If BG less than 200, may take 14 units of Novolog  If  - 250, may take 16 units of  Novolog  If  - 300, may take 18 units of Novolog  If  - 350, may take 20 units of Novolog  If  - 400, may take 22 units of Novolog  If +, may take 24 units of Novolog     Continue Chris 3 CGM  Blood Sugar Goals:       Fastin-130.       1-2 hours after a meal: Less than 180.     Follow up in about 2 months (around 2025) for Chris, Schedule non-fasting labs.

## 2024-11-22 ENCOUNTER — TELEPHONE (OUTPATIENT)
Dept: FAMILY MEDICINE | Facility: CLINIC | Age: 54
End: 2024-11-22
Payer: COMMERCIAL

## 2024-11-22 NOTE — TELEPHONE ENCOUNTER
----- Message from Shila sent at 11/22/2024 12:39 PM CST -----  Contact: pt  Type:  Same Day Appointment Request    Caller is requesting a same day appointment.  Caller declined first available appointment listed below.    Name of Caller:pt  When is the first available appointment? 11/25  Symptoms:Symptom: Colds.sinus  Outcome: Schedule an appointment to be seen within 24 hours.  Reason: Caller denied all higher acuity questions  Best Call Back Number: [phone]  Additional Information:  willing to see any provider  or maybe have something called in to pharm

## 2024-11-22 NOTE — TELEPHONE ENCOUNTER
Lm on vm that we do not have any available appts today. A lot of our Drs are out including Dr Ayon . I can offer an appt on Monday or advise an Urgent care if needs to be seen sooner .

## 2024-11-25 ENCOUNTER — PATIENT OUTREACH (OUTPATIENT)
Dept: ADMINISTRATIVE | Facility: HOSPITAL | Age: 54
End: 2024-11-25
Payer: COMMERCIAL

## 2024-11-25 DIAGNOSIS — E11.29 TYPE 2 DIABETES MELLITUS WITH MICROALBUMINURIA, WITH LONG-TERM CURRENT USE OF INSULIN: ICD-10-CM

## 2024-11-25 DIAGNOSIS — Z12.12 SCREENING FOR COLORECTAL CANCER: Primary | ICD-10-CM

## 2024-11-25 DIAGNOSIS — R80.9 TYPE 2 DIABETES MELLITUS WITH MICROALBUMINURIA, WITH LONG-TERM CURRENT USE OF INSULIN: ICD-10-CM

## 2024-11-25 DIAGNOSIS — Z12.11 SCREENING FOR COLORECTAL CANCER: Primary | ICD-10-CM

## 2024-11-25 DIAGNOSIS — Z79.4 TYPE 2 DIABETES MELLITUS WITH MICROALBUMINURIA, WITH LONG-TERM CURRENT USE OF INSULIN: ICD-10-CM

## 2024-11-25 RX ORDER — BLOOD-GLUCOSE SENSOR
1 EACH MISCELLANEOUS DAILY
Qty: 2 EACH | Refills: 0 | Status: SHIPPED | OUTPATIENT
Start: 2024-11-25 | End: 2025-11-25

## 2024-11-25 NOTE — TELEPHONE ENCOUNTER
----- Message from Marisol sent at 11/25/2024  8:29 AM CST -----  Type:  RX Refill Request    Who Called: Yarely  Refill or New Rx:  RX Name and Strength:Libre3  How is the patient currently taking it? (ex. 1XDay):  Is this a 30 day or 90 day RX:  Preferred Pharmacy with phone number:  [facility] [address]  [address] [facility] [address]  Phone: [phone] Fax: [phone]  Local or Mail Order:Local  Ordering Provider:Elida Machuca  Would the patient rather a call back or a response via [facility]? callback  Best Call Back Number:[phone]  Additional Information: Radha PA

## 2024-11-25 NOTE — PROGRESS NOTES
Spoke with Pt who informs she threw any the Cologuard from last year but to send her another one & she will complete it.

## 2024-11-25 NOTE — TELEPHONE ENCOUNTER
Courtesy refill of Chris 3 sensor given for coverage of CY [place].     Sofya Phillips PA-C  Diabetes Management

## 2024-12-04 PROBLEM — K74.69 OTHER CIRRHOSIS OF LIVER: Status: ACTIVE | Noted: 2024-12-04

## 2025-02-25 ENCOUNTER — PATIENT OUTREACH (OUTPATIENT)
Dept: ADMINISTRATIVE | Facility: HOSPITAL | Age: 55
End: 2025-02-25
Payer: COMMERCIAL

## 2025-02-25 DIAGNOSIS — E11.21 TYPE 2 DIABETES MELLITUS WITH DIABETIC NEPHROPATHY, WITHOUT LONG-TERM CURRENT USE OF INSULIN: Primary | ICD-10-CM

## 2025-02-25 NOTE — PROGRESS NOTES
VBC Program: Spoke with patient who informs she hasn't had a chance to complete Colorguard, Pt encouraged to complete kit & call number in pamphlet to have [employer] pick it up if need. Ha1c ordered then linked to upcoming labs appt.

## 2025-03-12 ENCOUNTER — PATIENT OUTREACH (OUTPATIENT)
Dept: ADMINISTRATIVE | Facility: HOSPITAL | Age: 55
End: 2025-03-12
Payer: COMMERCIAL

## 2025-03-17 ENCOUNTER — TELEPHONE (OUTPATIENT)
Dept: FAMILY MEDICINE | Facility: CLINIC | Age: 55
End: 2025-03-17
Payer: COMMERCIAL

## 2025-03-17 ENCOUNTER — ON-DEMAND VIRTUAL (OUTPATIENT)
Dept: URGENT CARE | Facility: CLINIC | Age: 55
End: 2025-03-17
Payer: COMMERCIAL

## 2025-03-17 DIAGNOSIS — M54.9 BACK PAIN, UNSPECIFIED BACK LOCATION, UNSPECIFIED BACK PAIN LATERALITY, UNSPECIFIED CHRONICITY: ICD-10-CM

## 2025-03-17 DIAGNOSIS — R30.0 DYSURIA: Primary | ICD-10-CM

## 2025-03-17 PROCEDURE — 98005 SYNCH AUDIO-VIDEO EST LOW 20: CPT | Mod: 95,,, | Performed by: NURSE PRACTITIONER

## 2025-03-17 RX ORDER — NITROFURANTOIN 25; 75 MG/1; MG/1
100 CAPSULE ORAL 2 TIMES DAILY
Qty: 10 CAPSULE | Refills: 0 | Status: SHIPPED | OUTPATIENT
Start: 2025-03-17 | End: 2025-03-22

## 2025-03-17 NOTE — TELEPHONE ENCOUNTER
----- Message from Andrei sent at 3/17/2025 12:10 PM CDT -----  Contact: self  Type:  Needs Medical AdviceWho Called: Yarely Neil (please be specific): UTI How long has patient had these symptoms:  3-4 daysPharmacy name and phone #:  [facility] [address] [facility] [facility] [address]Thhul: [phone] Fax: [phone] the patient rather a call back or a response via [facility]? Call [facility] Call Back Number: [phone] Information: the patient wants to see if she can get antibiotics  called in for her.

## 2025-03-17 NOTE — PROGRESS NOTES
Subjective:      Patient ID: Yarely Thompson is a 54 y.o. female.    Vitals:  vitals were not taken for this visit.     Chief Complaint: Dysuria      Visit Type: TELE AUDIOVISUAL - This visit was conducted virtually based on  subjective information and limited objective exam    Present with the patient at the time of consultation: TELEMED PRESENT WITH PATIENT: None  LOCATION OF PATIENT [place]  Two patient identifiers used to verify patient- saying out date of birth and full name.       Past Medical History:   Diagnosis Date    Anxiety     Cholelithiasis     Depression     Fatty liver     HTN (hypertension)     Hyperlipidemia     Other cirrhosis of liver 12/04/2024    Proteinuria     Dr. Sadler    Severe obesity (BMI 35.0-35.9 with comorbidity) 03/16/2018    ADEN (stress urinary incontinence, female)     Type 2 diabetes mellitus with hypertriglyceridemia 06/06/2022    Type 2 diabetes mellitus with microalbuminuria, with long-term current use of insulin 07/22/2016    Vitamin D insufficiency      History reviewed. No pertinent surgical history.  Review of patient's allergies indicates:   Allergen Reactions    Pcn [penicillins] Rash    Tylenol [acetaminophen] Anxiety     Medications Ordered Prior to Encounter[1]  Family History   Problem Relation Name Age of Onset    Hypertension Mother Janice Amador     Diabetes Mother Janice Amador     Asthma Mother Janice Amador     Lymphoma Father Clemente Amador     Hypertension Father Clemente Amador     Diabetes Father Clemente Amador     Cancer Father Clemente Amador     Heart disease Brother  57    Breast cancer Maternal Aunt      Ovarian cancer Maternal Grandmother      Early death Brother Roman Amador     Kidney disease Neg Hx      Glaucoma Neg Hx      Macular degeneration Neg Hx      Retinal detachment Neg Hx         Medications Ordered                [facility] [place] [facility] [address] [facility]   [address] [facility] [place] [address]    Telephone:  [phone]   Fax: [phone]   Hours: Not open 24 hours                         E-Prescribed (1 of 1)              nitrofurantoin, macrocrystal-monohydrate, (MACROBID) 100 MG capsule    Sig: Take 1 capsule (100 mg total) by mouth 2 (two) times daily. for 5 days       Start: 3/17/25     Quantity: 10 capsule Refills: 0                           Ohs Peq Odvv Intake    3/17/2025  2:35 PM CDT - Filed by Patient   What is your current physical address in the event of a medical emergency? [address] [facility] [address]   Are you able to take your vital signs? Yes   Systolic Blood Pressure: 120   Diastolic Blood Pressure: 72   Weight: 168   Height: 65   Pulse:    Temperature:    Respiration rate:    Pulse Oxygen:    Please attach any relevant images or files    Is your employer contracted with [facility]? No         53 yo female with c/o dysuria. She states symptoms started 4 days ago. She denies fever. Denies hematuria. She states some low back pain and burning on urination    Dysuria         Genitourinary:  Positive for dysuria.        Objective:   The physical exam was conducted virtually.    AAO x 3 ; no acute distress noted; appearance normal; mood and behavior normal; thought process normal  Head- normocephalic  Nose- appears normal, no discharge or erythema  Eyes- pupils appear normal in size, no drainage, no erythema  Ears- normal appearing; no discharge, no erythema  Mouth- appears normal  Oropharynx- no erythema, lesions  Lungs- breathing at a normal rate, no acute distress noted  Heart- no reports of tachycardia, palpitations, chest pain  Abdomen- non distended, non tender reported by patient  Skin- warm and dry, no erythema or edema noted by patient or visualized  Psych- as above; no si/hi      Assessment:     1. Dysuria    2. Back pain, unspecified back location, unspecified back pain laterality, unspecified chronicity        Plan:     PLEASE READ YOUR DISCHARGE INSTRUCTIONS ENTIRELY AS  IT CONTAINS IMPORTANT INFORMATION.      Take the antibiotics to completion.     Drink plenty of fluids, wipe front to back, take showers not baths, no scented soaps, wear breathable cotton underwear, urinate after sexual intercourse.     Please go to Urgent Care or the ER for worsening symptoms including fever, worsening flank pain, vomiting, etc.       Please return or see your primary care doctor if you develop new or worsening symptoms.     Please arrange follow up with your primary medical clinic as soon as possible. You must understand that you've received an Urgent Care treatment only and that you may be released before all of your medical problems are known or treated. You, the patient, will arrange for follow up as instructed. If your symptoms worsen or fail to improve you should go to the Emergency Room.  WE CANNOT RULE OUT ALL POSSIBLE CAUSES OF YOUR SYMPTOMS IN THE URGENT CARE SETTING PLEASE GO TO THE ER IF YOU FEELS YOUR CONDITION IS WORSENING OR YOU WOULD LIKE EMERGENT EVALUATION.      Thank you for choosing [facility]!    Our goal in the [facility] is to always provide outstanding medical care. You may receive a survey by mail or e-mail in the next week regarding your experience today. We would greatly appreciate you completing and returning the survey. Your feedback provides us with a way to recognize our staff who provide very good care, and it helps us learn how to improve when your experience was below our aspiration of excellence.         We appreciate you trusting us with your medical care. We hope you feel better soon. We will be happy to take care of you for all of your future medical needs.    You must understand that you've received an Urgent Care treatment only and that you may be released before all your medical problems are known or treated. You, the patient, will arrange for follow up care as instructed.    Follow up with your PCP or specialty clinic  as directed in the next 1-2 weeks if not improved or as needed.  You can call [phone] to schedule an appointment with the appropriate provider.    If your condition worsens we recommend that you receive another evaluation in person, with your primary care provider, urgent care or at the emergency room immediately or contact your primary medical clinics after hours call service to discuss your concerns.         Dysuria  -     nitrofurantoin, macrocrystal-monohydrate, (MACROBID) 100 MG capsule; Take 1 capsule (100 mg total) by mouth 2 (two) times daily. for 5 days  Dispense: 10 capsule; Refill: 0    Back pain, unspecified back location, unspecified back pain laterality, unspecified chronicity                           [1]   Current Outpatient Medications on File Prior to Visit   Medication Sig Dispense Refill    ARIPiprazole (ABILIFY) 5 MG Tab Take 1 tablet (5 mg total) by mouth once daily. 30 tablet 0    blood-glucose meter kit Use as directed      blood-glucose sensor (FREESTYLE VIRGILIO 3 SENSOR) Dana 1 each by Misc.(Non-Drug; Combo Route) route once daily. 2 each 0    cholecalciferol, vitamin D3, 125 mcg (5,000 unit) Tab Take 2,500 Units by mouth once daily.      clonazePAM (KLONOPIN) 0.5 MG tablet Take 1 tablet (0.5 mg total) by mouth every evening. 30 tablet 0    insulin aspart U-100 (NOVOLOG FLEXPEN U-100 INSULIN) 100 unit/mL (3 mL) InPn pen Inject 14-24 Units into the skin 3 (three) times daily with meals. USING CORRECTION SCALE 21.6 mL 11    insulin glargine U-100, Lantus, (LANTUS SOLOSTAR U-100 INSULIN) 100 unit/mL (3 mL) InPn pen Inject 46 Units into the skin once daily. 41.4 mL 3    lisinopriL (PRINIVIL,ZESTRIL) 20 MG tablet Take 1 tablet (20 mg total) by mouth once daily. 90 tablet 3    propranoloL (INDERAL) 10 MG tablet Take 10 mg by mouth 2 (two) times daily.      rosuvastatin (CRESTOR) 40 MG Tab Take 1 tablet (40 mg total) by mouth once daily. 90 tablet 3    sertraline (ZOLOFT) 100 MG tablet Take 2  "tablets (200 mg total) by mouth once daily. 60 tablet 0    spironolactone (ALDACTONE) 25 MG tablet Take 1 tablet (25 mg total) by mouth once daily. 90 tablet 1    SURE COMFORT PEN NEEDLE 31 gauge x 3/16" Ndle USE TO INJECT INSULINS FOUR TIMES DAILY 100 each 5    tirzepatide 10 mg/0.5 mL PnIj Inject 10 mg into the skin every 7 days. 2 mL 11     No current facility-administered medications on file prior to visit.     "

## 2025-03-17 NOTE — TELEPHONE ENCOUNTER
Spoke w/ pt , I let her know that Dr Ayon is out of the clinic today . No available appts in clinic today or tomorrow . I sent her a link to do an E visit today w/ Ms Shaver

## 2025-03-19 DIAGNOSIS — R80.9 TYPE 2 DIABETES MELLITUS WITH MICROALBUMINURIA, WITH LONG-TERM CURRENT USE OF INSULIN: ICD-10-CM

## 2025-03-19 DIAGNOSIS — Z79.4 TYPE 2 DIABETES MELLITUS WITH MICROALBUMINURIA, WITH LONG-TERM CURRENT USE OF INSULIN: ICD-10-CM

## 2025-03-19 DIAGNOSIS — E11.59 HYPERTENSION ASSOCIATED WITH DIABETES: ICD-10-CM

## 2025-03-19 DIAGNOSIS — I15.2 HYPERTENSION ASSOCIATED WITH DIABETES: ICD-10-CM

## 2025-03-19 DIAGNOSIS — E11.29 TYPE 2 DIABETES MELLITUS WITH MICROALBUMINURIA, WITH LONG-TERM CURRENT USE OF INSULIN: ICD-10-CM

## 2025-03-19 RX ORDER — SPIRONOLACTONE 25 MG/1
25 TABLET ORAL DAILY
Qty: 90 TABLET | Refills: 1 | Status: SHIPPED | OUTPATIENT
Start: 2025-03-19 | End: 2026-03-19

## 2025-03-28 ENCOUNTER — HOSPITAL ENCOUNTER (OUTPATIENT)
Dept: RADIOLOGY | Facility: HOSPITAL | Age: 55
Discharge: HOME | End: 2025-03-28
Attending: PHYSICIAN ASSISTANT
Payer: COMMERCIAL

## 2025-03-28 DIAGNOSIS — K74.60 HEPATIC CIRRHOSIS, UNSPECIFIED HEPATIC CIRRHOSIS TYPE, UNSPECIFIED WHETHER ASCITES PRESENT: ICD-10-CM

## 2025-03-28 PROCEDURE — 76705 ECHO EXAM OF ABDOMEN: CPT | Mod: TC,PO

## 2025-03-28 PROCEDURE — 76705 ECHO EXAM OF ABDOMEN: CPT | Mod: 26,,, | Performed by: STUDENT IN AN ORGANIZED HEALTH CARE EDUCATION/TRAINING PROGRAM

## 2025-03-31 ENCOUNTER — RESULTS FOLLOW-UP (OUTPATIENT)
Dept: FAMILY MEDICINE | Facility: CLINIC | Age: 55
End: 2025-03-31

## 2025-03-31 ENCOUNTER — TELEPHONE (OUTPATIENT)
Dept: FAMILY MEDICINE | Facility: CLINIC | Age: 55
End: 2025-03-31
Payer: COMMERCIAL

## 2025-03-31 ENCOUNTER — TELEPHONE (OUTPATIENT)
Dept: HEPATOLOGY | Facility: CLINIC | Age: 55
End: 2025-03-31
Payer: COMMERCIAL

## 2025-03-31 ENCOUNTER — RESULTS FOLLOW-UP (OUTPATIENT)
Dept: HEPATOLOGY | Facility: CLINIC | Age: 55
End: 2025-03-31

## 2025-03-31 DIAGNOSIS — E11.65 TYPE 2 DIABETES MELLITUS WITH HYPERGLYCEMIA, WITH LONG-TERM CURRENT USE OF INSULIN: Primary | ICD-10-CM

## 2025-03-31 DIAGNOSIS — Z79.4 TYPE 2 DIABETES MELLITUS WITH HYPERGLYCEMIA, WITH LONG-TERM CURRENT USE OF INSULIN: Primary | ICD-10-CM

## 2025-03-31 DIAGNOSIS — K74.60 HEPATIC CIRRHOSIS, UNSPECIFIED HEPATIC CIRRHOSIS TYPE, UNSPECIFIED WHETHER ASCITES PRESENT: Primary | ICD-10-CM

## 2025-03-31 NOTE — TELEPHONE ENCOUNTER
3/28/25 labs and ultrasound reviewed - stable      Please schedule: CBC, CMP, INR, AFP, U/S, VISIT in 9/2025    Notified via [facility]    Thanks

## 2025-03-31 NOTE — TELEPHONE ENCOUNTER
Attempt made to reach patient for scheduling preferences unsuccessful.  I left a .  Lab draw scheduled 9/26 and f/u visit scheduled 10/2; appt reminder notice mailed.  I asked that patient call back for changes if needed.

## 2025-04-11 ENCOUNTER — PATIENT MESSAGE (OUTPATIENT)
Dept: NEPHROLOGY | Facility: CLINIC | Age: 55
End: 2025-04-11
Payer: COMMERCIAL

## 2025-04-24 ENCOUNTER — OFFICE VISIT (OUTPATIENT)
Dept: DIABETES | Facility: CLINIC | Age: 55
End: 2025-04-24
Payer: COMMERCIAL

## 2025-04-24 VITALS
HEART RATE: 70 BPM | BODY MASS INDEX: 27.02 KG/M2 | WEIGHT: 162.38 LBS | SYSTOLIC BLOOD PRESSURE: 94 MMHG | DIASTOLIC BLOOD PRESSURE: 62 MMHG

## 2025-04-24 DIAGNOSIS — R80.9 TYPE 2 DIABETES MELLITUS WITH MICROALBUMINURIA, WITH LONG-TERM CURRENT USE OF INSULIN: Primary | ICD-10-CM

## 2025-04-24 DIAGNOSIS — E11.29 TYPE 2 DIABETES MELLITUS WITH MICROALBUMINURIA, WITH LONG-TERM CURRENT USE OF INSULIN: Primary | ICD-10-CM

## 2025-04-24 DIAGNOSIS — E11.69 HYPERLIPIDEMIA ASSOCIATED WITH TYPE 2 DIABETES MELLITUS: ICD-10-CM

## 2025-04-24 DIAGNOSIS — I15.2 HYPERTENSION ASSOCIATED WITH DIABETES: ICD-10-CM

## 2025-04-24 DIAGNOSIS — K76.0 FATTY LIVER: ICD-10-CM

## 2025-04-24 DIAGNOSIS — E11.59 HYPERTENSION ASSOCIATED WITH DIABETES: ICD-10-CM

## 2025-04-24 DIAGNOSIS — Z79.4 TYPE 2 DIABETES MELLITUS WITH MICROALBUMINURIA, WITH LONG-TERM CURRENT USE OF INSULIN: Primary | ICD-10-CM

## 2025-04-24 DIAGNOSIS — E78.5 HYPERLIPIDEMIA ASSOCIATED WITH TYPE 2 DIABETES MELLITUS: ICD-10-CM

## 2025-04-24 LAB — GLUCOSE SERPL-MCNC: 117 MG/DL (ref 70–110)

## 2025-04-24 PROCEDURE — G2211 COMPLEX E/M VISIT ADD ON: HCPCS | Mod: S$GLB,,, | Performed by: NURSE PRACTITIONER

## 2025-04-24 PROCEDURE — 82962 GLUCOSE BLOOD TEST: CPT | Mod: S$GLB,,, | Performed by: NURSE PRACTITIONER

## 2025-04-24 PROCEDURE — 4010F ACE/ARB THERAPY RXD/TAKEN: CPT | Mod: CPTII,S$GLB,, | Performed by: NURSE PRACTITIONER

## 2025-04-24 PROCEDURE — 3074F SYST BP LT 130 MM HG: CPT | Mod: CPTII,S$GLB,, | Performed by: NURSE PRACTITIONER

## 2025-04-24 PROCEDURE — 3078F DIAST BP <80 MM HG: CPT | Mod: CPTII,S$GLB,, | Performed by: NURSE PRACTITIONER

## 2025-04-24 PROCEDURE — 99214 OFFICE O/P EST MOD 30 MIN: CPT | Mod: S$GLB,,, | Performed by: NURSE PRACTITIONER

## 2025-04-24 PROCEDURE — 1159F MED LIST DOCD IN RCRD: CPT | Mod: CPTII,S$GLB,, | Performed by: NURSE PRACTITIONER

## 2025-04-24 PROCEDURE — 3072F LOW RISK FOR RETINOPATHY: CPT | Mod: CPTII,S$GLB,, | Performed by: NURSE PRACTITIONER

## 2025-04-24 PROCEDURE — 3044F HG A1C LEVEL LT 7.0%: CPT | Mod: CPTII,S$GLB,, | Performed by: NURSE PRACTITIONER

## 2025-04-24 PROCEDURE — 3008F BODY MASS INDEX DOCD: CPT | Mod: CPTII,S$GLB,, | Performed by: NURSE PRACTITIONER

## 2025-04-24 PROCEDURE — 95251 CONT GLUC MNTR ANALYSIS I&R: CPT | Mod: S$GLB,,, | Performed by: NURSE PRACTITIONER

## 2025-04-24 PROCEDURE — 99999 PR PBB SHADOW E&M-EST. PATIENT-LVL IV: CPT | Mod: PBBFAC,,, | Performed by: NURSE PRACTITIONER

## 2025-04-24 RX ORDER — FLUTICASONE PROPIONATE 50 MCG
2 SPRAY, SUSPENSION (ML) NASAL
COMMUNITY
Start: 2024-11-22

## 2025-04-24 RX ORDER — INSULIN GLARGINE 300 [IU]/ML
50 INJECTION, SOLUTION SUBCUTANEOUS DAILY
Qty: 15 ML | Refills: 3 | Status: SHIPPED | OUTPATIENT
Start: 2025-04-24 | End: 2026-04-24

## 2025-04-24 RX ORDER — BUSPIRONE HYDROCHLORIDE 10 MG/1
10 TABLET ORAL 2 TIMES DAILY
COMMUNITY

## 2025-04-24 RX ORDER — TIRZEPATIDE 12.5 MG/.5ML
12.5 INJECTION, SOLUTION SUBCUTANEOUS
Qty: 2 ML | Refills: 11 | Status: SHIPPED | OUTPATIENT
Start: 2025-04-24 | End: 2026-04-24

## 2025-04-24 NOTE — PATIENT INSTRUCTIONS
PATIENT INSTRUCTIONS     Lifestyle modification with well balanced diet and at least 30 minutes of physical activity daily recommended.   Increase water intake.   Increase protein and non-starchy vegetable servings with meals.   Decrease carbohydrate servings with meals.   Take 10 minute walk after each meal.   Avoid snacking on carbohydrates, choose low carb, high protein snacks.   Incorporate resistance training with weights or resistance bands with exercise regimen at least 3 days a week.       Increase Mounjaro at to 12.5 mg subcutaneously every 7 days.   Change Lantus to Toujeo Max and increase to 50 units subcutaneously daily.     Continue Novolog three times daily with meals using correction dosing.  Take 10-15 minutes before meal.   Bring insulin to work, take before lunch.     If BG less than 200, may take 14 units of Novolog  If  - 250, may take 16 units of Novolog  If  - 300, may take 18 units of Novolog  If  - 350, may take 20 units of Novolog  If  - 400, may take 22 units of Novolog  If +, may take 24 units of Novolog     Continue Chris 3 CGM  Blood Sugar Goals:       Fastin-130.       1-2 hours after a meal: Less than 180.

## 2025-04-24 NOTE — PROGRESS NOTES
"Yarely Thompson is a 54 y.o. female who presents for a follow up evaluation of Type 2 diabetes mellitus.     CHIEF COMPLAINT: Diabetes Consultation    PCP: Mikey Ayon MD      Initial visit with me - 6/26/23    The patient was initially diagnosed with diabetes 8 years ago.      Previous failed treatments include:  Metformin - GI upset.     Social Documentation:  Patient lives in [place] with .   Occupation: Resident  at [employer].   Exercise: no formal exercise program.   "I do not watch what I eat"      Diabetes related complications:   nephropathy.   denies Pancreatitis  denies Gastroparesis  denies DKA  denies Hx/family Hx of MEN2/MTC  reports Frequent UTIs/yeast infections    Diabetes Medications              insulin aspart U-100 (NOVOLOG FLEXPEN U-100 INSULIN) 100 unit/mL (3 mL) InPn pen Inject 14-24 Units into the skin 3 (three) times daily with meals. USING CORRECTION SCALE    insulin glargine U-100, Lantus, (LANTUS SOLOSTAR U-100 INSULIN) 100 unit/mL (3 mL) InPn pen Inject 46 Units into the skin once daily.    tirzepatide 10 mg/0.5 mL PnIj Inject 10 mg into the skin every 7 days.     Current monitoring regimen: Chris 3 CGM    The patient's Chris CGM was downloaded and reviewed. For 14 days, patient average glucose was 273 mg/dL. She was above range 89% of the time, in range 11% of the time, and below range 0% of the time. The target range for this patient was 70 - 180 mg/dL. Overall, there was a pattern of hyperlgycemia. .      Recent hypoglycemic episodes: yes  Patient compliant with glucose checks and medication administration? Yes    DIABETES MANAGEMENT STATUS  Statin: Taking  ACE/ARB: Taking  Screening or Prevention Patient's value Goal Complete/Controlled?   HgA1C Testing and Control   Lab Results   Component Value Date    HGBA1C 6.9 (H) 03/28/2025      Annually/Less than 8% No   Lipid profile : 08/19/2024 Annually Yes   LDL control Lab Results   Component Value " "Date    LDLCALC 40.6 (L) 08/19/2024    Annually/Less than 100 mg/dl  Yes   Nephropathy screening Lab Results   Component Value Date    LABMICR 897.0 08/19/2024     No results found for: "PROTEINUA"  Lab Results   Component Value Date    UTPCR 1.07 (H) 08/19/2024      Annually Yes   Blood pressure BP Readings from Last 1 Encounters:   04/24/25 94/62    Less than 140/90 Yes   Dilated retinal exam : 06/20/2024 Annually Yes   Foot exam   : 08/19/2024 Annually No   Patient's medications, allergies, surgical, social and family histories were reviewed and updated as appropriate.     Review of Systems   Constitutional:  Negative for weight loss.   Eyes:  Negative for blurred vision and double vision.   Cardiovascular:  Negative for chest pain.   Gastrointestinal:  Negative for nausea and vomiting.   Genitourinary:  Negative for frequency.   Musculoskeletal:  Negative for falls.   Neurological:  Negative for dizziness and weakness.   Endo/Heme/Allergies:  Negative for polydipsia.   Psychiatric/Behavioral:  Negative for depression.    All other systems reviewed and are negative.       Physical Exam  Constitutional:       Appearance: Normal appearance.   HENT:      Head: Normocephalic and atraumatic.   Pulmonary:      Effort: No respiratory distress.   Musculoskeletal:      Cervical back: Normal range of motion.   Neurological:      Mental Status: She is alert and oriented to person, place, and time.   Psychiatric:         Mood and Affect: Mood normal.         Behavior: Behavior normal.        Blood pressure 94/62, pulse 70, weight 73.7 kg (162 lb 6.4 oz).  Wt Readings from Last 3 Encounters:   04/24/25 73.7 kg (162 lb 6.4 oz)   11/21/24 78.2 kg (172 lb 4.8 oz)   10/23/24 78.5 kg (173 lb 2.7 oz)       LAB REVIEW  Lab Results   Component Value Date     (L) 03/28/2025    K 4.3 03/28/2025     03/28/2025    CO2 22 (L) 03/28/2025    BUN 11 03/28/2025    CREATININE 0.9 03/28/2025    CALCIUM 9.8 03/28/2025    ANIONGAP 10 " 03/28/2025    EGFRNORACEVR >60 03/28/2025     Lab Results   Component Value Date    CPEPTIDE 4.45 12/22/2022     Hemoglobin A1C   Date Value Ref Range Status   11/21/2024 8.8 (H) 4.0 - 5.6 % Final     Comment:     ADA Screening Guidelines:  5.7-6.4%  Consistent with prediabetes  >or=6.5%  Consistent with diabetes    High levels of fetal hemoglobin interfere with the HbA1C  assay. Heterozygous hemoglobin variants (HbS, HgC, etc)do  not significantly interfere with this assay.   However, presence of multiple variants may affect accuracy.     08/19/2024 9.9 (H) 4.0 - 5.6 % Final     Comment:     ADA Screening Guidelines:  5.7-6.4%  Consistent with prediabetes  >or=6.5%  Consistent with diabetes    High levels of fetal hemoglobin interfere with the HbA1C  assay. Heterozygous hemoglobin variants (HbS, HgC, etc)do  not significantly interfere with this assay.   However, presence of multiple variants may affect accuracy.     09/14/2023 9.0 (H) 4.0 - 5.6 % Final     Comment:     ADA Screening Guidelines:  5.7-6.4%  Consistent with prediabetes  >or=6.5%  Consistent with diabetes    High levels of fetal hemoglobin interfere with the HbA1C  assay. Heterozygous hemoglobin variants (HbS, HgC, etc)do  not significantly interfere with this assay.   However, presence of multiple variants may affect accuracy.       Hemoglobin A1c   Date Value Ref Range Status   03/28/2025 6.9 (H) 4.0 - 5.6 % Final     Comment:     ADA Screening Guidelines:  5.7-6.4%  Consistent with prediabetes  >=6.5%  Consistent with diabetes    High levels of fetal hemoglobin interfere with the HbA1C  assay. Heterozygous hemoglobin variants (HbS, HgC, etc)do  not significantly interfere with this assay.   However, presence of multiple variants may affect accuracy.        ASSESSMENT    ICD-10-CM ICD-9-CM   1. Type 2 diabetes mellitus with microalbuminuria, with long-term current use of insulin  E11.29 250.40    R80.9 791.0    Z79.4 V58.67   2. Hypertension  associated with diabetes  E11.59 250.80    I15.2 401.9   3. Hyperlipidemia associated with type 2 diabetes mellitus  E11.69 250.80    E78.5 272.4   4. Fatty liver  K76.0 571.8       PLAN  Diagnoses and all orders for this visit:    Type 2 diabetes mellitus with microalbuminuria, with long-term current use of insulin  -     POCT Glucose, Hand-Held Device  -     MOUNJARO 12.5 mg/0.5 mL PnIj; Inject 12.5 mg into the skin every 7 days.  -     insulin glargine U-300 conc (TOUJEO MAX U-300 SOLOSTAR) 300 unit/mL (3 mL) insulin pen; Inject 50 Units into the skin once daily.    Hypertension associated with diabetes    Hyperlipidemia associated with type 2 diabetes mellitus    Fatty liver      Reviewed pathophysiology of diabetes, complications related to the disease, importance of annual dilated eye exam and daily foot examination. Explained MOA, SE, dosage of medications. Written instructions given and reviewed with patient and patient verbalizes understanding.     11/1/2023 - continues to drink 1-2 iced coffees from [employer] daily. Will see dietician today to discuss diet. Will increase mounjaro to 10 mg, lantus to 42 units daily. F/u 6 wks with labs.    9/19/2024 - poc glucose >500 in office today. Patient drank iced coffee prior to arrival. Patient is advised to seek emergency treatment today for her severe hyperglycemia. She declines. Denies polyuria/polydipsia. Overall hyperglycemia on Chris. Discussed in depth need for lifestyle changes. Stressed importance of no sugary drinks, including iced coffees.     11/21/2024 - mounjaro increased to 10, lantus to 46 units.     PATIENT INSTRUCTIONS     Lifestyle modification with well balanced diet and at least 30 minutes of physical activity daily recommended.   Increase water intake.   Increase protein and non-starchy vegetable servings with meals.   Decrease carbohydrate servings with meals.   Take 10 minute walk after each meal.   Avoid snacking on carbohydrates, choose  low carb, high protein snacks.   Incorporate resistance training with weights or resistance bands with exercise regimen at least 3 days a week.       Increase Mounjaro at to 12.5 mg subcutaneously every 7 days.   Change Lantus to Toujeo Max and increase to 50 units subcutaneously daily.     Continue Novolog three times daily with meals using correction dosing.  Take 10-15 minutes before meal.   Bring insulin to work, take before lunch.     If BG less than 200, may take 14 units of Novolog  If  - 250, may take 16 units of Novolog  If  - 300, may take 18 units of Novolog  If  - 350, may take 20 units of Novolog  If  - 400, may take 22 units of Novolog  If +, may take 24 units of Novolog     Continue Chris 3 CGM  Blood Sugar Goals:       Fastin-130.       1-2 hours after a meal: Less than 180.     Follow up in about 2 months (around 2025) for Chris.

## 2025-04-30 ENCOUNTER — PATIENT MESSAGE (OUTPATIENT)
Dept: HEPATOLOGY | Facility: CLINIC | Age: 55
End: 2025-04-30
Payer: COMMERCIAL

## 2025-05-07 ENCOUNTER — TELEPHONE (OUTPATIENT)
Dept: DIABETES | Facility: CLINIC | Age: 55
End: 2025-05-07
Payer: COMMERCIAL

## 2025-05-07 RX ORDER — TIRZEPATIDE 10 MG/.5ML
10 INJECTION, SOLUTION SUBCUTANEOUS
Qty: 2 ML | Refills: 11 | Status: SHIPPED | OUTPATIENT
Start: 2025-05-07 | End: 2026-05-07

## 2025-05-07 NOTE — TELEPHONE ENCOUNTER
Spoke with patient where she informs us that her 10 mg was changed to 12.5 pt states that she has not been able to eat for 3 days. Pt also states that she feels like she has the flu .Pt is dizzy ,weakness ,chills .

## 2025-05-07 NOTE — TELEPHONE ENCOUNTER
----- Message from Burton sent at 5/7/2025  9:16 AM CDT -----  Type:  Needs Medical AdviceWho Called: PAULIE SPRAGUE [[id number]]Symptoms (please be specific):  How long has patient had these symptoms:  Pharmacy name and phone #:  Would the patient rather a call back or a response via [facility]? Best Call Back Number:  [phone] Information: Medication MOUNJARO 12.5 mg/0.5 mL PnIj is casing sickness

## 2025-05-07 NOTE — TELEPHONE ENCOUNTER
Pt was informed Dosage decreased back to 10 mg. Instruct to seek emergency evaluation if symptoms worsen. Pt verbally understood

## 2025-05-28 ENCOUNTER — PATIENT OUTREACH (OUTPATIENT)
Dept: ADMINISTRATIVE | Facility: HOSPITAL | Age: 55
End: 2025-05-28
Payer: COMMERCIAL

## 2025-05-28 NOTE — PROGRESS NOTES
VBC Program: No answer, recent Preventive Screening msg sent via Portal.       VBHM Score: 1      Colon Cancer Screening

## 2025-06-24 ENCOUNTER — TELEPHONE (OUTPATIENT)
Dept: DIABETES | Facility: CLINIC | Age: 55
End: 2025-06-24
Payer: COMMERCIAL

## 2025-06-24 NOTE — TELEPHONE ENCOUNTER
Return patient call.. her concern was, where she give herself shots for her insulin in the lower part of her stomach is causing irration to her breast. She is thinking but not for sure its neuropathy. She also stated that she had neuropathy before on her legs and it looks the same as before.

## 2025-06-24 NOTE — TELEPHONE ENCOUNTER
Copied from CRM #[id number]. Topic: General Inquiry - Patient Advice  >> Jun 24, 2025  9:44 AM Vladimir wrote:  .Type:  Needs Medical Advice    Who Called:  Yarely     Would the patient rather a call back or a response via [facility]?  Call back   Best Call Back Number:  [phone]  Additional Information:  Pt is calling in regard to getting a call back to discuss questions and concerns

## 2025-06-26 ENCOUNTER — OFFICE VISIT (OUTPATIENT)
Dept: DIABETES | Facility: CLINIC | Age: 55
End: 2025-06-26
Payer: COMMERCIAL

## 2025-06-26 VITALS
BODY MASS INDEX: 26.69 KG/M2 | HEART RATE: 73 BPM | WEIGHT: 160.38 LBS | SYSTOLIC BLOOD PRESSURE: 110 MMHG | DIASTOLIC BLOOD PRESSURE: 67 MMHG

## 2025-06-26 DIAGNOSIS — E11.69 HYPERLIPIDEMIA ASSOCIATED WITH TYPE 2 DIABETES MELLITUS: ICD-10-CM

## 2025-06-26 DIAGNOSIS — I15.2 HYPERTENSION ASSOCIATED WITH DIABETES: ICD-10-CM

## 2025-06-26 DIAGNOSIS — E11.29 TYPE 2 DIABETES MELLITUS WITH MICROALBUMINURIA, WITH LONG-TERM CURRENT USE OF INSULIN: Primary | ICD-10-CM

## 2025-06-26 DIAGNOSIS — E11.59 HYPERTENSION ASSOCIATED WITH DIABETES: ICD-10-CM

## 2025-06-26 DIAGNOSIS — E78.5 HYPERLIPIDEMIA ASSOCIATED WITH TYPE 2 DIABETES MELLITUS: ICD-10-CM

## 2025-06-26 DIAGNOSIS — Z79.4 TYPE 2 DIABETES MELLITUS WITH MICROALBUMINURIA, WITH LONG-TERM CURRENT USE OF INSULIN: Primary | ICD-10-CM

## 2025-06-26 DIAGNOSIS — R80.9 TYPE 2 DIABETES MELLITUS WITH MICROALBUMINURIA, WITH LONG-TERM CURRENT USE OF INSULIN: Primary | ICD-10-CM

## 2025-06-26 LAB — GLUCOSE SERPL-MCNC: 144 MG/DL (ref 70–110)

## 2025-06-26 PROCEDURE — 99999 PR PBB SHADOW E&M-EST. PATIENT-LVL IV: CPT | Mod: PBBFAC,,, | Performed by: NURSE PRACTITIONER

## 2025-06-26 PROCEDURE — 3078F DIAST BP <80 MM HG: CPT | Mod: CPTII,S$GLB,, | Performed by: NURSE PRACTITIONER

## 2025-06-26 PROCEDURE — 99214 OFFICE O/P EST MOD 30 MIN: CPT | Mod: S$GLB,,, | Performed by: NURSE PRACTITIONER

## 2025-06-26 PROCEDURE — G2211 COMPLEX E/M VISIT ADD ON: HCPCS | Mod: S$GLB,,, | Performed by: NURSE PRACTITIONER

## 2025-06-26 PROCEDURE — 82962 GLUCOSE BLOOD TEST: CPT | Mod: S$GLB,,, | Performed by: NURSE PRACTITIONER

## 2025-06-26 PROCEDURE — 1159F MED LIST DOCD IN RCRD: CPT | Mod: CPTII,S$GLB,, | Performed by: NURSE PRACTITIONER

## 2025-06-26 PROCEDURE — 3008F BODY MASS INDEX DOCD: CPT | Mod: CPTII,S$GLB,, | Performed by: NURSE PRACTITIONER

## 2025-06-26 PROCEDURE — 95251 CONT GLUC MNTR ANALYSIS I&R: CPT | Mod: S$GLB,,, | Performed by: NURSE PRACTITIONER

## 2025-06-26 PROCEDURE — 3074F SYST BP LT 130 MM HG: CPT | Mod: CPTII,S$GLB,, | Performed by: NURSE PRACTITIONER

## 2025-06-26 PROCEDURE — 3044F HG A1C LEVEL LT 7.0%: CPT | Mod: CPTII,S$GLB,, | Performed by: NURSE PRACTITIONER

## 2025-06-26 PROCEDURE — 4010F ACE/ARB THERAPY RXD/TAKEN: CPT | Mod: CPTII,S$GLB,, | Performed by: NURSE PRACTITIONER

## 2025-06-26 NOTE — PROGRESS NOTES
"Yarely Thompson is a 54 y.o. female who presents for a follow up evaluation of Type 2 diabetes mellitus.     CHIEF COMPLAINT: Diabetes Consultation    PCP: Mikey Ayon MD      Initial visit with me - 6/26/23    The patient was initially diagnosed with diabetes 8 years ago.      Previous failed treatments include:  Metformin - GI upset.  Mounjaro 12.5 mg - GI upset     Social Documentation:  Patient lives in [place] with .   Occupation: Resident  at [employer].   Exercise: no formal exercise program.   "I do not watch what I eat"      Diabetes related complications:   nephropathy.   denies Pancreatitis  denies Gastroparesis  denies DKA  denies Hx/family Hx of MEN2/MTC  reports Frequent UTIs/yeast infections    Diabetes Medications              insulin aspart U-100 (NOVOLOG FLEXPEN U-100 INSULIN) 100 unit/mL (3 mL) InPn pen Inject 14-24 Units into the skin 3 (three) times daily with meals. USING CORRECTION SCALE    insulin glargine U-300 conc (TOUJEO MAX U-300 SOLOSTAR) 300 unit/mL (3 mL) insulin pen Inject 50 Units into the skin once daily.    tirzepatide (MOUNJARO) 10 mg/0.5 mL PnIj Inject 10 mg into the skin every 7 days.     Current monitoring regimen: Chris 3 CGM    The patient's Chris CGM was downloaded and reviewed. For 14 days, patient average glucose was 186 mg/dL. She was above range 41% of the time, in range 59% of the time, and below range 0% of the time. The target range for this patient was 70 - 180 mg/dL. Overall, there was a pattern of mild post prandial hyperglycemia, other than on 6/14, hyperglycemia all day..      [place]: reports ate ice cream all day that day.     Recent hypoglycemic episodes: yes  Patient compliant with glucose checks and medication administration? Yes    DIABETES MANAGEMENT STATUS  Statin: Taking  ACE/ARB: Taking  Screening or Prevention Patient's value Goal Complete/Controlled?   HgA1C Testing and Control   Lab Results   Component " "Value Date    HGBA1C 6.9 (H) 03/28/2025      Annually/Less than 8% No   Lipid profile : 08/19/2024 Annually Yes   LDL control Lab Results   Component Value Date    LDLCALC 40.6 (L) 08/19/2024    Annually/Less than 100 mg/dl  Yes   Nephropathy screening Lab Results   Component Value Date    LABMICR 897.0 08/19/2024     No results found for: "PROTEINUA"  Lab Results   Component Value Date    UTPCR 1.07 (H) 08/19/2024      Annually Yes   Blood pressure BP Readings from Last 1 Encounters:   06/26/25 110/67    Less than 140/90 Yes   Dilated retinal exam : 06/20/2024 Annually Yes   Foot exam   : 08/19/2024 Annually No   Patient's medications, allergies, surgical, social and family histories were reviewed and updated as appropriate.     Review of Systems   Constitutional:  Negative for weight loss.   Eyes:  Negative for blurred vision and double vision.   Cardiovascular:  Negative for chest pain.   Gastrointestinal:  Negative for nausea and vomiting.   Genitourinary:  Negative for frequency.   Musculoskeletal:  Negative for falls.   Neurological:  Negative for dizziness and weakness.   Endo/Heme/Allergies:  Negative for polydipsia.   Psychiatric/Behavioral:  Negative for depression.    All other systems reviewed and are negative.       Physical Exam  Constitutional:       Appearance: Normal appearance.   HENT:      Head: Normocephalic and atraumatic.   Pulmonary:      Effort: No respiratory distress.   Musculoskeletal:      Cervical back: Normal range of motion.   Neurological:      Mental Status: She is alert and oriented to person, place, and time.   Psychiatric:         Mood and Affect: Mood normal.         Behavior: Behavior normal.        Blood pressure 110/67, pulse 73, weight 72.8 kg (160 lb 6.4 oz).  Wt Readings from Last 3 Encounters:   06/26/25 72.8 kg (160 lb 6.4 oz)   04/24/25 73.7 kg (162 lb 6.4 oz)   11/21/24 78.2 kg (172 lb 4.8 oz)       LAB REVIEW  Lab Results   Component Value Date     (L) 03/28/2025 "    K 4.3 03/28/2025     03/28/2025    CO2 22 (L) 03/28/2025    BUN 11 03/28/2025    CREATININE 0.9 03/28/2025    CALCIUM 9.8 03/28/2025    ANIONGAP 10 03/28/2025    EGFRNORACEVR >60 03/28/2025     Lab Results   Component Value Date    CPEPTIDE 4.45 12/22/2022     Hemoglobin A1C   Date Value Ref Range Status   11/21/2024 8.8 (H) 4.0 - 5.6 % Final     Comment:     ADA Screening Guidelines:  5.7-6.4%  Consistent with prediabetes  >or=6.5%  Consistent with diabetes    High levels of fetal hemoglobin interfere with the HbA1C  assay. Heterozygous hemoglobin variants (HbS, HgC, etc)do  not significantly interfere with this assay.   However, presence of multiple variants may affect accuracy.     08/19/2024 9.9 (H) 4.0 - 5.6 % Final     Comment:     ADA Screening Guidelines:  5.7-6.4%  Consistent with prediabetes  >or=6.5%  Consistent with diabetes    High levels of fetal hemoglobin interfere with the HbA1C  assay. Heterozygous hemoglobin variants (HbS, HgC, etc)do  not significantly interfere with this assay.   However, presence of multiple variants may affect accuracy.     09/14/2023 9.0 (H) 4.0 - 5.6 % Final     Comment:     ADA Screening Guidelines:  5.7-6.4%  Consistent with prediabetes  >or=6.5%  Consistent with diabetes    High levels of fetal hemoglobin interfere with the HbA1C  assay. Heterozygous hemoglobin variants (HbS, HgC, etc)do  not significantly interfere with this assay.   However, presence of multiple variants may affect accuracy.       Hemoglobin A1c   Date Value Ref Range Status   03/28/2025 6.9 (H) 4.0 - 5.6 % Final     Comment:     ADA Screening Guidelines:  5.7-6.4%  Consistent with prediabetes  >=6.5%  Consistent with diabetes    High levels of fetal hemoglobin interfere with the HbA1C  assay. Heterozygous hemoglobin variants (HbS, HgC, etc)do  not significantly interfere with this assay.   However, presence of multiple variants may affect accuracy.        ASSESSMENT    ICD-10-CM ICD-9-CM   1.  Type 2 diabetes mellitus with microalbuminuria, with long-term current use of insulin  E11.29 250.40    R80.9 791.0    Z79.4 V58.67   2. Hypertension associated with diabetes  E11.59 250.80    I15.2 401.9   3. Hyperlipidemia associated with type 2 diabetes mellitus  E11.69 250.80    E78.5 272.4       PLAN  Diagnoses and all orders for this visit:    Type 2 diabetes mellitus with microalbuminuria, with long-term current use of insulin  -     POCT Glucose, Hand-Held Device    Hypertension associated with diabetes    Hyperlipidemia associated with type 2 diabetes mellitus      Reviewed pathophysiology of diabetes, complications related to the disease, importance of annual dilated eye exam and daily foot examination. Explained MOA, SE, dosage of medications. Written instructions given and reviewed with patient and patient verbalizes understanding.     11/1/2023 - continues to drink 1-2 iced coffees from [employer] daily. Will see dietician today to discuss diet. Will increase mounjaro to 10 mg, lantus to 42 units daily. F/u 6 wks with labs.    9/19/2024 - poc glucose >500 in office today. Patient drank iced coffee prior to arrival. Patient is advised to seek emergency treatment today for her severe hyperglycemia. She declines. Denies polyuria/polydipsia. Overall hyperglycemia on Chris. Discussed in depth need for lifestyle changes. Stressed importance of no sugary drinks, including iced coffees.     11/21/2024 - mounjaro increased to 10, lantus to 46 units.     PATIENT INSTRUCTIONS     Lifestyle modification with well balanced diet and at least 30 minutes of physical activity daily recommended.   Increase water intake.   Increase protein and non-starchy vegetable servings with meals.   Decrease carbohydrate servings with meals.   Take 10 minute walk after each meal.   Avoid snacking on carbohydrates, choose low carb, high protein snacks.   Incorporate resistance training with weights or resistance bands with exercise  regimen at least 3 days a week.       Continue Mounjaro 10 mg subcutaneously every 7 days.   Continue Lantus Toujeo Max 50 units subcutaneously daily.     Continue Novolog three times daily with meals using correction dosing.  Take 10-15 minutes before meal.   Bring insulin to work, take before lunch.     If BG less than 200, may take 14 units of Novolog  If  - 250, may take 16 units of Novolog  If  - 300, may take 18 units of Novolog  If  - 350, may take 20 units of Novolog  If  - 400, may take 22 units of Novolog  If +, may take 24 units of Novolog     Continue Chris 3 CGM  Blood Sugar Goals:       Fastin-130.       1-2 hours after a meal: Less than 180.     Follow up in about 3 months (around 2025) for Chris.

## 2025-06-26 NOTE — PATIENT INSTRUCTIONS
PATIENT INSTRUCTIONS     Lifestyle modification with well balanced diet and at least 30 minutes of physical activity daily recommended.   Increase water intake.   Increase protein and non-starchy vegetable servings with meals.   Decrease carbohydrate servings with meals.   Take 10 minute walk after each meal.   Avoid snacking on carbohydrates, choose low carb, high protein snacks.   Incorporate resistance training with weights or resistance bands with exercise regimen at least 3 days a week.       Continue Mounjaro 10 mg subcutaneously every 7 days.   Continue Lantus Toujeo Max 50 units subcutaneously daily.     Continue Novolog three times daily with meals using correction dosing.  Take 10-15 minutes before meal.   Bring insulin to work, take before lunch.     If BG less than 200, may take 14 units of Novolog  If  - 250, may take 16 units of Novolog  If  - 300, may take 18 units of Novolog  If  - 350, may take 20 units of Novolog  If  - 400, may take 22 units of Novolog  If +, may take 24 units of Novolog     Continue Chris 3 CGM  Blood Sugar Goals:       Fastin-130.       1-2 hours after a meal: Less than 180.

## 2025-06-27 DIAGNOSIS — Z79.4 TYPE 2 DIABETES MELLITUS WITH MICROALBUMINURIA, WITH LONG-TERM CURRENT USE OF INSULIN: ICD-10-CM

## 2025-06-27 DIAGNOSIS — R80.9 TYPE 2 DIABETES MELLITUS WITH MICROALBUMINURIA, WITH LONG-TERM CURRENT USE OF INSULIN: ICD-10-CM

## 2025-06-27 DIAGNOSIS — E11.29 TYPE 2 DIABETES MELLITUS WITH MICROALBUMINURIA, WITH LONG-TERM CURRENT USE OF INSULIN: ICD-10-CM

## 2025-06-27 RX ORDER — ROSUVASTATIN CALCIUM 40 MG/1
40 TABLET, COATED ORAL DAILY
Qty: 90 TABLET | Refills: 0 | Status: SHIPPED | OUTPATIENT
Start: 2025-06-27

## 2025-06-27 NOTE — TELEPHONE ENCOUNTER
Care Due:                  Date            Visit Type   Department     Provider  --------------------------------------------------------------------------------                                VIRTUAL      [facility] FAM[facility]  Last Visit: 09-      AUDIO ONLY   MEDICINE       Mikey Ayon  Next Visit: None Scheduled  None         None Found                                                            Last  Test          Frequency    Reason                     Performed    Due Date  --------------------------------------------------------------------------------    Lipid Panel.  12 months..  rosuvastatin.............  08- 08-    [facility] Embedded Care Due Messages. Reference number: [id number].   6/27/2025 2:23:15 PM CDT

## 2025-06-28 NOTE — TELEPHONE ENCOUNTER
Refill Routing Note   Medication(s) are not appropriate for processing by [facility] for the following reason(s):        Outside of protocol: Sliding scale insulin dose outside [facility] protocol    [facility] action(s):  Route  Approve      Medication Therapy Plan: FLOS      Appointments  past 12m or future 3m with PCP    Date Provider   Last Visit   9/20/2024 Mikey Ayon MD   Next Visit   Visit date not found Mikey Ayon MD   ED visits in past 90 days: 0        Note composed:9:27 PM 06/27/2025

## 2025-06-30 RX ORDER — INSULIN ASPART 100 [IU]/ML
INJECTION, SOLUTION INTRAVENOUS; SUBCUTANEOUS
Qty: 15 ML | Refills: 3 | Status: SHIPPED | OUTPATIENT
Start: 2025-06-30

## 2025-07-07 RX ORDER — LISINOPRIL 20 MG/1
20 TABLET ORAL DAILY
Qty: 90 TABLET | Refills: 0 | Status: SHIPPED | OUTPATIENT
Start: 2025-07-07

## 2025-07-07 NOTE — TELEPHONE ENCOUNTER
No care due was identified.  [facility] Embedded Care Due Messages. Reference number: [id number].   7/07/2025 11:17:58 AM CDT

## 2025-07-07 NOTE — TELEPHONE ENCOUNTER
Refill Decision Note   Yarely Thompson  is requesting a refill authorization.  Brief Assessment and Rationale for Refill:  Approve     Medication Therapy Plan:         Comments:     Note composed:12:01 PM 07/07/2025

## 2025-08-19 ENCOUNTER — PATIENT MESSAGE (OUTPATIENT)
Dept: ADMINISTRATIVE | Facility: HOSPITAL | Age: 55
End: 2025-08-19
Payer: COMMERCIAL

## 2025-08-28 ENCOUNTER — PATIENT OUTREACH (OUTPATIENT)
Dept: ADMINISTRATIVE | Facility: HOSPITAL | Age: 55
End: 2025-08-28
Payer: COMMERCIAL